# Patient Record
Sex: FEMALE | Race: WHITE | URBAN - METROPOLITAN AREA
[De-identification: names, ages, dates, MRNs, and addresses within clinical notes are randomized per-mention and may not be internally consistent; named-entity substitution may affect disease eponyms.]

---

## 2017-03-02 ENCOUNTER — IMPORTED ENCOUNTER (OUTPATIENT)
Dept: URBAN - METROPOLITAN AREA CLINIC 50 | Facility: CLINIC | Age: 82
End: 2017-03-02

## 2017-04-04 ENCOUNTER — IMPORTED ENCOUNTER (OUTPATIENT)
Dept: URBAN - METROPOLITAN AREA CLINIC 50 | Facility: CLINIC | Age: 82
End: 2017-04-04

## 2019-04-26 ENCOUNTER — RECORD ABSTRACTING (OUTPATIENT)
Age: 84
End: 2019-04-26

## 2019-04-26 DIAGNOSIS — M06.9 RHEUMATOID ARTHRITIS, UNSPECIFIED: ICD-10-CM

## 2019-04-26 DIAGNOSIS — I10 ESSENTIAL (PRIMARY) HYPERTENSION: ICD-10-CM

## 2019-04-26 DIAGNOSIS — Z83.3 FAMILY HISTORY OF DIABETES MELLITUS: ICD-10-CM

## 2019-04-26 DIAGNOSIS — M54.9 DORSALGIA, UNSPECIFIED: ICD-10-CM

## 2019-04-26 DIAGNOSIS — Z78.9 OTHER SPECIFIED HEALTH STATUS: ICD-10-CM

## 2019-04-26 DIAGNOSIS — C44.90 UNSPECIFIED MALIGNANT NEOPLASM OF SKIN, UNSPECIFIED: ICD-10-CM

## 2019-04-26 DIAGNOSIS — Z86.73 PERSONAL HISTORY OF TRANSIENT ISCHEMIC ATTACK (TIA), AND CEREBRAL INFARCTION W/OUT RESIDUAL DEFICITS: ICD-10-CM

## 2019-04-26 DIAGNOSIS — R10.9 UNSPECIFIED ABDOMINAL PAIN: ICD-10-CM

## 2019-04-26 DIAGNOSIS — M19.90 UNSPECIFIED OSTEOARTHRITIS, UNSPECIFIED SITE: ICD-10-CM

## 2019-04-26 DIAGNOSIS — Z80.3 FAMILY HISTORY OF MALIGNANT NEOPLASM OF BREAST: ICD-10-CM

## 2019-04-26 DIAGNOSIS — H40.9 UNSPECIFIED GLAUCOMA: ICD-10-CM

## 2019-04-26 DIAGNOSIS — F17.200 NICOTINE DEPENDENCE, UNSPECIFIED, UNCOMPLICATED: ICD-10-CM

## 2019-04-26 DIAGNOSIS — Z86.010 PERSONAL HISTORY OF COLONIC POLYPS: ICD-10-CM

## 2019-04-26 DIAGNOSIS — K59.00 CONSTIPATION, UNSPECIFIED: ICD-10-CM

## 2019-04-26 DIAGNOSIS — K80.20 CALCULUS OF GALLBLADDER W/OUT CHOLECYSTITIS W/OUT OBSTRUCTION: ICD-10-CM

## 2019-04-26 DIAGNOSIS — Z80.0 FAMILY HISTORY OF MALIGNANT NEOPLASM OF DIGESTIVE ORGANS: ICD-10-CM

## 2019-04-26 DIAGNOSIS — Z87.898 PERSONAL HISTORY OF OTHER SPECIFIED CONDITIONS: ICD-10-CM

## 2019-04-26 DIAGNOSIS — E78.00 PURE HYPERCHOLESTEROLEMIA, UNSPECIFIED: ICD-10-CM

## 2019-04-26 PROBLEM — Z00.00 ENCOUNTER FOR PREVENTIVE HEALTH EXAMINATION: Noted: 2019-04-26

## 2019-05-15 ENCOUNTER — APPOINTMENT (OUTPATIENT)
Dept: GASTROENTEROLOGY | Facility: CLINIC | Age: 84
End: 2019-05-15
Payer: MEDICARE

## 2019-05-15 VITALS
WEIGHT: 138 LBS | HEART RATE: 92 BPM | TEMPERATURE: 97.7 F | HEIGHT: 67 IN | BODY MASS INDEX: 21.66 KG/M2 | SYSTOLIC BLOOD PRESSURE: 127 MMHG | DIASTOLIC BLOOD PRESSURE: 71 MMHG

## 2019-05-15 PROCEDURE — 99213 OFFICE O/P EST LOW 20 MIN: CPT

## 2019-05-15 NOTE — HISTORY OF PRESENT ILLNESS
[de-identified] : 84 yo female with of left lower quadrant pain. CT scan did not show any pathology in that area other than diverticular disease. Patient was started on dicyclomine and has had resolution of her symptoms. She has been moving her bowels well. Issues about screening discussed with patient.

## 2019-09-13 ENCOUNTER — INPATIENT (INPATIENT)
Facility: HOSPITAL | Age: 84
LOS: 2 days | Discharge: HOME HEALTH | DRG: 804 | End: 2019-09-16
Attending: FAMILY MEDICINE | Admitting: FAMILY MEDICINE
Payer: MEDICARE

## 2019-09-13 VITALS
SYSTOLIC BLOOD PRESSURE: 153 MMHG | TEMPERATURE: 98 F | HEART RATE: 92 BPM | DIASTOLIC BLOOD PRESSURE: 86 MMHG | OXYGEN SATURATION: 88 % | RESPIRATION RATE: 18 BRPM

## 2019-09-13 DIAGNOSIS — T82.898A OTHER SPECIFIED COMPLICATION OF VASCULAR PROSTHETIC DEVICES, IMPLANTS AND GRAFTS, INITIAL ENCOUNTER: Chronic | ICD-10-CM

## 2019-09-13 DIAGNOSIS — R91.8 OTHER NONSPECIFIC ABNORMAL FINDING OF LUNG FIELD: ICD-10-CM

## 2019-09-13 DIAGNOSIS — Z98.890 OTHER SPECIFIED POSTPROCEDURAL STATES: Chronic | ICD-10-CM

## 2019-09-13 DIAGNOSIS — Q89.2 CONGENITAL MALFORMATIONS OF OTHER ENDOCRINE GLANDS: Chronic | ICD-10-CM

## 2019-09-13 LAB
ALBUMIN SERPL ELPH-MCNC: 3.3 G/DL — SIGNIFICANT CHANGE UP (ref 3.3–5)
ALP SERPL-CCNC: 79 U/L — SIGNIFICANT CHANGE UP (ref 40–120)
ALT FLD-CCNC: 22 U/L — SIGNIFICANT CHANGE UP (ref 12–78)
ANION GAP SERPL CALC-SCNC: 7 MMOL/L — SIGNIFICANT CHANGE UP (ref 5–17)
APTT BLD: 38.5 SEC — HIGH (ref 27.5–36.3)
AST SERPL-CCNC: 24 U/L — SIGNIFICANT CHANGE UP (ref 15–37)
BILIRUB SERPL-MCNC: 0.3 MG/DL — SIGNIFICANT CHANGE UP (ref 0.2–1.2)
BUN SERPL-MCNC: 15 MG/DL — SIGNIFICANT CHANGE UP (ref 7–23)
CALCIUM SERPL-MCNC: 9.2 MG/DL — SIGNIFICANT CHANGE UP (ref 8.5–10.1)
CHLORIDE SERPL-SCNC: 106 MMOL/L — SIGNIFICANT CHANGE UP (ref 96–108)
CO2 SERPL-SCNC: 27 MMOL/L — SIGNIFICANT CHANGE UP (ref 22–31)
CREAT SERPL-MCNC: 0.82 MG/DL — SIGNIFICANT CHANGE UP (ref 0.5–1.3)
GLUCOSE SERPL-MCNC: 106 MG/DL — HIGH (ref 70–99)
HCT VFR BLD CALC: 38.4 % — SIGNIFICANT CHANGE UP (ref 34.5–45)
HGB BLD-MCNC: 13.1 G/DL — SIGNIFICANT CHANGE UP (ref 11.5–15.5)
INR BLD: 1.39 RATIO — HIGH (ref 0.88–1.16)
MAGNESIUM SERPL-MCNC: 2.3 MG/DL — SIGNIFICANT CHANGE UP (ref 1.6–2.6)
MCHC RBC-ENTMCNC: 32.4 PG — SIGNIFICANT CHANGE UP (ref 27–34)
MCHC RBC-ENTMCNC: 34.1 GM/DL — SIGNIFICANT CHANGE UP (ref 32–36)
MCV RBC AUTO: 95 FL — SIGNIFICANT CHANGE UP (ref 80–100)
PLATELET # BLD AUTO: 230 K/UL — SIGNIFICANT CHANGE UP (ref 150–400)
POTASSIUM SERPL-MCNC: 4.3 MMOL/L — SIGNIFICANT CHANGE UP (ref 3.5–5.3)
POTASSIUM SERPL-SCNC: 4.3 MMOL/L — SIGNIFICANT CHANGE UP (ref 3.5–5.3)
PROT SERPL-MCNC: 7 GM/DL — SIGNIFICANT CHANGE UP (ref 6–8.3)
PROTHROM AB SERPL-ACNC: 15.6 SEC — HIGH (ref 10–12.9)
RBC # BLD: 4.04 M/UL — SIGNIFICANT CHANGE UP (ref 3.8–5.2)
RBC # FLD: 13 % — SIGNIFICANT CHANGE UP (ref 10.3–14.5)
SODIUM SERPL-SCNC: 140 MMOL/L — SIGNIFICANT CHANGE UP (ref 135–145)
TROPONIN I SERPL-MCNC: <0.015 NG/ML — SIGNIFICANT CHANGE UP (ref 0.01–0.04)
WBC # BLD: 9.77 K/UL — SIGNIFICANT CHANGE UP (ref 3.8–10.5)
WBC # FLD AUTO: 9.77 K/UL — SIGNIFICANT CHANGE UP (ref 3.8–10.5)

## 2019-09-13 PROCEDURE — 71250 CT THORAX DX C-: CPT

## 2019-09-13 PROCEDURE — 85027 COMPLETE CBC AUTOMATED: CPT

## 2019-09-13 PROCEDURE — 97116 GAIT TRAINING THERAPY: CPT | Mod: GP

## 2019-09-13 PROCEDURE — 76942 ECHO GUIDE FOR BIOPSY: CPT

## 2019-09-13 PROCEDURE — G0008: CPT

## 2019-09-13 PROCEDURE — 90686 IIV4 VACC NO PRSV 0.5 ML IM: CPT

## 2019-09-13 PROCEDURE — 38505 NEEDLE BIOPSY LYMPH NODES: CPT

## 2019-09-13 PROCEDURE — 94760 N-INVAS EAR/PLS OXIMETRY 1: CPT

## 2019-09-13 PROCEDURE — 80048 BASIC METABOLIC PNL TOTAL CA: CPT

## 2019-09-13 PROCEDURE — 93010 ELECTROCARDIOGRAM REPORT: CPT

## 2019-09-13 PROCEDURE — 88173 CYTOPATH EVAL FNA REPORT: CPT

## 2019-09-13 PROCEDURE — 85610 PROTHROMBIN TIME: CPT

## 2019-09-13 PROCEDURE — 88172 CYTP DX EVAL FNA 1ST EA SITE: CPT

## 2019-09-13 PROCEDURE — 81001 URINALYSIS AUTO W/SCOPE: CPT

## 2019-09-13 PROCEDURE — 94640 AIRWAY INHALATION TREATMENT: CPT

## 2019-09-13 PROCEDURE — 88305 TISSUE EXAM BY PATHOLOGIST: CPT

## 2019-09-13 PROCEDURE — 88342 IMHCHEM/IMCYTCHM 1ST ANTB: CPT

## 2019-09-13 PROCEDURE — 88341 IMHCHEM/IMCYTCHM EA ADD ANTB: CPT

## 2019-09-13 PROCEDURE — 85025 COMPLETE CBC W/AUTO DIFF WBC: CPT

## 2019-09-13 PROCEDURE — 84443 ASSAY THYROID STIM HORMONE: CPT

## 2019-09-13 PROCEDURE — 82607 VITAMIN B-12: CPT

## 2019-09-13 PROCEDURE — 36415 COLL VENOUS BLD VENIPUNCTURE: CPT

## 2019-09-13 PROCEDURE — 71250 CT THORAX DX C-: CPT | Mod: 26

## 2019-09-13 PROCEDURE — 97162 PT EVAL MOD COMPLEX 30 MIN: CPT | Mod: GP

## 2019-09-13 RX ORDER — ALPRAZOLAM 0.25 MG
0.25 TABLET ORAL
Refills: 0 | Status: DISCONTINUED | OUTPATIENT
Start: 2019-09-13 | End: 2019-09-16

## 2019-09-13 RX ORDER — ATORVASTATIN CALCIUM 80 MG/1
40 TABLET, FILM COATED ORAL AT BEDTIME
Refills: 0 | Status: DISCONTINUED | OUTPATIENT
Start: 2019-09-13 | End: 2019-09-16

## 2019-09-13 RX ORDER — DIPHENHYDRAMINE HCL 50 MG
50 CAPSULE ORAL AT BEDTIME
Refills: 0 | Status: DISCONTINUED | OUTPATIENT
Start: 2019-09-13 | End: 2019-09-16

## 2019-09-13 RX ORDER — ONDANSETRON 8 MG/1
4 TABLET, FILM COATED ORAL EVERY 6 HOURS
Refills: 0 | Status: DISCONTINUED | OUTPATIENT
Start: 2019-09-13 | End: 2019-09-16

## 2019-09-13 RX ORDER — BUDESONIDE AND FORMOTEROL FUMARATE DIHYDRATE 160; 4.5 UG/1; UG/1
2 AEROSOL RESPIRATORY (INHALATION)
Refills: 0 | Status: DISCONTINUED | OUTPATIENT
Start: 2019-09-13 | End: 2019-09-16

## 2019-09-13 RX ORDER — ACETAMINOPHEN 500 MG
650 TABLET ORAL EVERY 6 HOURS
Refills: 0 | Status: DISCONTINUED | OUTPATIENT
Start: 2019-09-13 | End: 2019-09-13

## 2019-09-13 RX ORDER — INFLUENZA VIRUS VACCINE 15; 15; 15; 15 UG/.5ML; UG/.5ML; UG/.5ML; UG/.5ML
0.5 SUSPENSION INTRAMUSCULAR ONCE
Refills: 0 | Status: COMPLETED | OUTPATIENT
Start: 2019-09-13 | End: 2019-09-16

## 2019-09-13 RX ORDER — GABAPENTIN 400 MG/1
300 CAPSULE ORAL
Refills: 0 | Status: DISCONTINUED | OUTPATIENT
Start: 2019-09-13 | End: 2019-09-16

## 2019-09-13 RX ORDER — DILTIAZEM HCL 120 MG
120 CAPSULE, EXT RELEASE 24 HR ORAL DAILY
Refills: 0 | Status: DISCONTINUED | OUTPATIENT
Start: 2019-09-13 | End: 2019-09-16

## 2019-09-13 RX ORDER — HEPARIN SODIUM 5000 [USP'U]/ML
5000 INJECTION INTRAVENOUS; SUBCUTANEOUS EVERY 12 HOURS
Refills: 0 | Status: DISCONTINUED | OUTPATIENT
Start: 2019-09-13 | End: 2019-09-15

## 2019-09-13 RX ORDER — OXYCODONE AND ACETAMINOPHEN 5; 325 MG/1; MG/1
1 TABLET ORAL EVERY 8 HOURS
Refills: 0 | Status: DISCONTINUED | OUTPATIENT
Start: 2019-09-13 | End: 2019-09-15

## 2019-09-13 RX ORDER — IPRATROPIUM/ALBUTEROL SULFATE 18-103MCG
3 AEROSOL WITH ADAPTER (GRAM) INHALATION EVERY 6 HOURS
Refills: 0 | Status: DISCONTINUED | OUTPATIENT
Start: 2019-09-13 | End: 2019-09-16

## 2019-09-13 RX ORDER — ASPIRIN/CALCIUM CARB/MAGNESIUM 324 MG
81 TABLET ORAL DAILY
Refills: 0 | Status: DISCONTINUED | OUTPATIENT
Start: 2019-09-13 | End: 2019-09-16

## 2019-09-13 RX ORDER — ACETAMINOPHEN 500 MG
650 TABLET ORAL ONCE
Refills: 0 | Status: COMPLETED | OUTPATIENT
Start: 2019-09-13 | End: 2019-09-13

## 2019-09-13 RX ORDER — OXYCODONE AND ACETAMINOPHEN 5; 325 MG/1; MG/1
1 TABLET ORAL ONCE
Refills: 0 | Status: DISCONTINUED | OUTPATIENT
Start: 2019-09-13 | End: 2019-09-13

## 2019-09-13 RX ORDER — LOSARTAN POTASSIUM 100 MG/1
50 TABLET, FILM COATED ORAL DAILY
Refills: 0 | Status: DISCONTINUED | OUTPATIENT
Start: 2019-09-13 | End: 2019-09-14

## 2019-09-13 RX ADMIN — OXYCODONE AND ACETAMINOPHEN 1 TABLET(S): 5; 325 TABLET ORAL at 23:00

## 2019-09-13 RX ADMIN — HEPARIN SODIUM 5000 UNIT(S): 5000 INJECTION INTRAVENOUS; SUBCUTANEOUS at 17:03

## 2019-09-13 RX ADMIN — Medication 10 MILLIGRAM(S): at 17:03

## 2019-09-13 RX ADMIN — GABAPENTIN 300 MILLIGRAM(S): 400 CAPSULE ORAL at 23:01

## 2019-09-13 RX ADMIN — Medication 3 MILLILITER(S): at 20:36

## 2019-09-13 RX ADMIN — Medication 650 MILLIGRAM(S): at 21:32

## 2019-09-13 RX ADMIN — GABAPENTIN 300 MILLIGRAM(S): 400 CAPSULE ORAL at 17:03

## 2019-09-13 RX ADMIN — OXYCODONE AND ACETAMINOPHEN 1 TABLET(S): 5; 325 TABLET ORAL at 23:30

## 2019-09-13 RX ADMIN — ATORVASTATIN CALCIUM 40 MILLIGRAM(S): 80 TABLET, FILM COATED ORAL at 21:18

## 2019-09-13 RX ADMIN — OXYCODONE AND ACETAMINOPHEN 1 TABLET(S): 5; 325 TABLET ORAL at 15:07

## 2019-09-13 NOTE — CONSULT NOTE ADULT - SUBJECTIVE AND OBJECTIVE BOX
INPATIENT CONSULTATION   REQUESTING PHYSICIAN: Dr. Lezama  REASON FOR CONSULT: Large left upper lobe mass  HISTORY OF PRESENT ILLNESS: Overall this is an 85-year-old female who initially presented several weeks ago after extensive history of chest discomfort and shortness of breath.  The patient had underwent CT of the chest which was suggested a large left upper lobe pulmonary lesion encasing the right pulmonary artery.  The patient was seen approximately 10 days ago subsequently underwent an MRI of the brain as well as a PET/CT which was notable for the presence of supraclavicular nodes being FDG avid.  Of note the patient had contacted our office after hours with concerns regarding weakness and inability to grab objects at home.  The patient was advised to come to the emergency room due to concerns regarding safety at home.  As the patient had significant neurological deficits and were unable to be assessed otherwise.  The patient upon arrival to the emergency room underwent a CT of the chest which we emphasized the presence of the left upper lobe lung mass as well as the supraclavicular nodes.  The patient had been already scheduled in who was seen by radiation oncology due to the concern regarding this left upper lobe lesion.  Due to the patient's progressive symptoms as well as the abruptness of the symptoms and her independent lifestyle whereby she lives alone is on full dose anticoagulation and noted that she was quite unsteady .  She also notes that over the last day or so she has been unable to firmly grabbed any objects as well as has significant difficulty with writing with her left hand that has been progressive over the last 1-2 days.    PAST MEDICAL HISTORY:    1.  Severe COPD  2.  Left upper lobe lung mass  3.  Atrial fibrillation on full dose anticoagulation with Eliquis  MEDICATIONS:  MEDICATIONS  (STANDING):  ALBUTerol/ipratropium for Nebulization 3 milliLiter(s) Nebulizer every 6 hours  aspirin enteric coated 81 milliGRAM(s) Oral daily  atorvastatin 40 milliGRAM(s) Oral at bedtime  buDESOnide 160 MICROgram(s)/formoterol 4.5 MICROgram(s) Inhaler 2 Puff(s) Inhalation two times a day  dicyclomine 10 milliGRAM(s) Oral three times a day before meals  diltiazem    milliGRAM(s) Oral daily  gabapentin 300 milliGRAM(s) Oral four times a day  heparin  Injectable 5000 Unit(s) SubCutaneous every 12 hours  influenza   Vaccine 0.5 milliLiter(s) IntraMuscular once  losartan 50 milliGRAM(s) Oral daily    ALLERGIES:     Allergies    No Known Allergies    Intolerances      SOCIAL HISTORY: Active smoker smoked greater than 40-pack-year smoking history  FAMILY HISTORY: Sibling with lung cancer  REVIEW OF SYSTEMS: Patient notes a significant weakness in the left upper extremity no facial redness fullness or pain associated with the extremity does note some paresthesias in the left upper extremity.  Patient denies any chest pain any additional lightheadedness or dizziness.  PHYSICAL EXAMINATION:   The patient appeared well nourished and normally developed. Vital signs as documented. Head exam is unremarkable. No scleral icterus or corneal arcus noted. Neck is without jugular venous distension, thyromegaly, or carotid bruits. Carotid upstrokes are brisk bilaterally. Lungs are noted to have diminished breath sounds in the left upper lung field. Cardiac exam reveals . Rhythm is regular. First and second heart sounds normal. No murmurs, rubs or gallops. Abdominal exam reveals normal bowel sounds, no masses, no organomegaly and no aortic enlargement. Extremities are nonedematous and both femoral and pedal pulses are normal.  Neurologically the patient has significantly impaired  and hand strength in the left upper extremity sensation is diminished in the median nerve distribution and also on exam the patient had significant difficulty with writing her name.    ADDITIONAL DATA:    .  LABS:                         13.1   9.77  )-----------( 230      ( 13 Sep 2019 12:54 )             38.4     09-13    140  |  106  |  15  ----------------------------<  106<H>  4.3   |  27  |  0.82    Ca    9.2      13 Sep 2019 12:54  Mg     2.3     09-13    TPro  7.0  /  Alb  3.3  /  TBili  0.3  /  DBili  x   /  AST  24  /  ALT  22  /  AlkPhos  79  09-13    PT/INR - ( 13 Sep 2019 12:54 )   PT: 15.6 sec;   INR: 1.39 ratio         PTT - ( 13 Sep 2019 12:54 )  PTT:38.5 sec          RADIOLOGY, EKG & ADDITIONAL TESTS: Reviewed.     ASSESSMENT:    85-year-old female with extensive tobacco use history with severe emphysematous disease recently found to have a large extensive left upper lobe lung mass with supraclavicular lymphadenopathy and hence if deemed to be positive as suspected with malignancy stage IV lung cancer.    At this time it is unclear as far as the patient's pathology.  The patient has been evaluated by both radiation oncology in outpatient setting and was pending evaluation by thoracic surgery for biopsy of the lesion.  I explained to the patient my concern regarding her progressive neurologic symptoms over the last 1-2 days and as such as the patient lives independently I am concerned regarding her overall safety and her increased risk of possibly undergoing a traumatic fall.    The patient is presently on full dose anticoagulation and would hold off on Eliquis over the next 1-2 days and plan for a supraclavicular biopsy.    Also of note during today's visit had an extensive discussion with the patient whereby we discussed overall prognosis.  She was quite interested if she were to forego any systemic treatment due to the overall prognosis of her disease.  I explained to her at this time it would be imperative for us to establish a diagnosis prior to proceeding with any planned options regarding discontinuation ablation or no treatment.    I explained to her I am increasingly concerned about the progression of the left upper lobe lesion as it may cause significant morbidity associated with progression as well as neurological compromise. Mri was recenltly performed and was negative.     I did explain to the patient that if we were not to pursue any systemic chemotherapy that her prognosis would indeed be hospice eligible.  Next the line at this time the patient would not like to pursue with a workup as well as be mindful of any particular treatment strategies which may be employed in her care.    Recommendations:     Proceed with biopsy of supraclavicular lymph node.  Thoracic Surgery    Would recommend a physical therapy and Occupational Therapy consult.  Also would recommend continue ongoing neurological checks on the patient to ensure no significant progression.  If the patient's symptoms should significantly change over the weekend would recommend radiation oncology evaluation.  The patient has already been seen by Dr. Luc Bunn on Thursday.

## 2019-09-13 NOTE — ED ADULT TRIAGE NOTE - DIRECT TO ROOM CARE INITIATED:
13-Sep-2019 12:13 Cartilage Graft Text: The defect edges were debeveled with a #15 scalpel blade.  Given the location of the defect, shape of the defect, the fact the defect involved a full thickness cartilage defect a cartilage graft was deemed most appropriate.  An appropriate donor site was identified, cleansed, and anesthetized. The cartilage graft was then harvested and transferred to the recipient site, oriented appropriately and then sutured into place.  The secondary defect was then repaired using a primary closure.

## 2019-09-13 NOTE — ED PROVIDER NOTE - PMH
COPD (chronic obstructive pulmonary disease)    HLD (hyperlipidemia)    HTN (hypertension)    Lung mass    Rheumatoid arthritis

## 2019-09-13 NOTE — H&P ADULT - HISTORY OF PRESENT ILLNESS
84 y/o WF with a PMHx of COPD, Left Lung mass, PAfib, HLD, HTN, RA presents to the ED c/o left arm pain for the past few months associated with some weakness and tingling of L hand. She called her PMD who directed her to the emergency room.      Past Medical History:  COPD (chronic obstructive pulmonary disease)    HLD (hyperlipidemia)    HTN (hypertension)    Lung mass    Rheumatoid arthritis.    Past Surgical History:  abdominal herniorrhaphy with mesh  Rt hand surgery     Social History:  active smoker, no Etoh, no drugs    Family History:  both parents CVA      REVIEW OF SYSTEMS:    CONSTITUTIONAL: No weakness, No fevers or chills  ENT: No ear ache, No sorethroat  NECK: No pain, No stiffness  RESPIRATORY: No cough, No wheezing, No hemoptysis; No dyspnea  CARDIOVASCULAR: No chest pain, No palpitations  GASTROINTESTINAL: No abd pain, No nausea, No vomiting, No hematemesis, No diarrhea or constipation. No melena, No hematochezia.  GENITOURINARY: No dysuria, No  hematuria  NEUROLOGICAL: No diplopia, No paresthesia, No motor dysfunction  MUSCULOSKELETAL: No arthralgia, No myalgia  SKIN: No rashes, or lesions   PSYCH: no anxiety, no suicidal ideation    All other review of systems is negative unless indicated above    Vital Signs Last 24 Hrs  T(C): 36.6 (13 Sep 2019 12:24), Max: 36.6 (13 Sep 2019 12:24)  T(F): 97.8 (13 Sep 2019 12:24), Max: 97.8 (13 Sep 2019 12:24)  HR: 99 (13 Sep 2019 14:14) (92 - 99)  BP: 128/94 (13 Sep 2019 14:14) (128/94 - 153/86)  RR: 22 (13 Sep 2019 14:14) (18 - 22)  SpO2: 97% (13 Sep 2019 14:14) (88% - 97%)    PHYSICAL EXAM:    GENERAL: NAD, Well nourished  HEENT:  NC/AT, EOMI, PERRLA, No scleral icterus, Moist mucous membranes  NECK: Supple, No JVD  CNS:  Alert & Oriented X3, Motor Strength 5/5 B/L upper and lower extremities; DTRs 2+ intact   LUNG: decreased BS bilaterally  HEART: RRR; No murmurs, No rubs  ABDOMEN: +BS, ST/ND/NT  GENITOURINARY: Voiding, Bladder not distended  EXTREMITIES:  2+ Peripheral Pulses, No clubbing, No cyanosis, No tibial edema  MUSCULOSKELTAL: Joints normal ROM, No TTP, No effusion  SKIN: no rashes  RECTAL: deferred, not indicated  BREAST: deferred                          13.1   9.77  )-----------( 230      ( 13 Sep 2019 12:54 )             38.4     09-13    140  |  106  |  15  ----------------------------<  106<H>  4.3   |  27  |  0.82    Ca    9.2      13 Sep 2019 12:54  Mg     2.3     09-13    TPro  7.0  /  Alb  3.3  /  TBili  0.3  /  DBili  x   /  AST  24  /  ALT  22  /  AlkPhos  79  09-13      MEDICATIONS  (STANDING):  ALBUTerol/ipratropium for Nebulization 3 milliLiter(s) Nebulizer every 6 hours  aspirin enteric coated 81 milliGRAM(s) Oral daily  atorvastatin 40 milliGRAM(s) Oral at bedtime  buDESOnide 160 MICROgram(s)/formoterol 4.5 MICROgram(s) Inhaler 2 Puff(s) Inhalation two times a day  dicyclomine 10 milliGRAM(s) Oral three times a day before meals  diltiazem    milliGRAM(s) Oral daily  gabapentin 300 milliGRAM(s) Oral four times a day  losartan 50 milliGRAM(s) Oral daily    MEDICATIONS  (PRN):  ALPRAZolam 0.25 milliGRAM(s) Oral two times a day PRN for anxiety, for insomnia  aluminum hydroxide/magnesium hydroxide/simethicone Suspension 30 milliLiter(s) Oral every 4 hours PRN Dyspepsia  diphenhydrAMINE 50 milliGRAM(s) Oral at bedtime PRN Insomnia  ondansetron Injectable 4 milliGRAM(s) IV Push every 6 hours PRN Nausea  oxyCODONE    5 mG/acetaminophen 325 mG 1 Tablet(s) Oral every 8 hours PRN for moderate pain      all labs reviewed  all imaging reviewed      a/p:    1. Left lung mass:  Pulmonary evaluation  IR evaluation for biopsy    2. LUE paresthesia likely due brachial plexus compression    3. Afib:   Ekg: NSR  c/w Cardizem  hold Apixaban prior to biopsy    4. Htn:   cw Losartan    5. Copd:  stable  cw LABA 86 y/o WF with a PMHx of COPD, Left Lung mass, PAfib, HLD, HTN, RA presents to the ED c/o left arm pain for the past few months associated with some weakness and tingling of L hand. She called her PMD who directed her to the emergency room.      Past Medical History:  COPD (chronic obstructive pulmonary disease)    HLD (hyperlipidemia)    HTN (hypertension)    Lung mass    Rheumatoid arthritis.    Past Surgical History:  abdominal herniorrhaphy with mesh  Rt hand surgery     Social History:  active smoker, no Etoh, no drugs    Family History:  both parents CVA      REVIEW OF SYSTEMS:    CONSTITUTIONAL: No weakness, No fevers or chills  ENT: No ear ache, No sorethroat  NECK: No pain, No stiffness  RESPIRATORY: No cough, No wheezing, No hemoptysis; No dyspnea  CARDIOVASCULAR: No chest pain, No palpitations  GASTROINTESTINAL: No abd pain, No nausea, No vomiting, No hematemesis, No diarrhea or constipation. No melena, No hematochezia.  GENITOURINARY: No dysuria, No  hematuria  NEUROLOGICAL: No diplopia, No paresthesia, No motor dysfunction  MUSCULOSKELETAL: No arthralgia, No myalgia  SKIN: No rashes, or lesions   PSYCH: no anxiety, no suicidal ideation    All other review of systems is negative unless indicated above    Vital Signs Last 24 Hrs  T(C): 36.6 (13 Sep 2019 12:24), Max: 36.6 (13 Sep 2019 12:24)  T(F): 97.8 (13 Sep 2019 12:24), Max: 97.8 (13 Sep 2019 12:24)  HR: 99 (13 Sep 2019 14:14) (92 - 99)  BP: 128/94 (13 Sep 2019 14:14) (128/94 - 153/86)  RR: 22 (13 Sep 2019 14:14) (18 - 22)  SpO2: 97% (13 Sep 2019 14:14) (88% - 97%)    PHYSICAL EXAM:    GENERAL: NAD, Well nourished  HEENT:  NC/AT, EOMI, PERRLA, No scleral icterus, Moist mucous membranes  NECK: Supple, No JVD  CNS:  Alert & Oriented X3, Motor Strength 5/5 B/L upper and lower extremities; DTRs 2+ intact   LUNG: decreased BS bilaterally  HEART: RRR; No murmurs, No rubs  ABDOMEN: +BS, ST/ND/NT  GENITOURINARY: Voiding, Bladder not distended  EXTREMITIES:  2+ Peripheral Pulses, No clubbing, No cyanosis, No tibial edema  MUSCULOSKELTAL: Joints normal ROM, No TTP, No effusion  SKIN: no rashes  RECTAL: deferred, not indicated  BREAST: deferred                          13.1   9.77  )-----------( 230      ( 13 Sep 2019 12:54 )             38.4     09-13    140  |  106  |  15  ----------------------------<  106<H>  4.3   |  27  |  0.82    Ca    9.2      13 Sep 2019 12:54  Mg     2.3     09-13    TPro  7.0  /  Alb  3.3  /  TBili  0.3  /  DBili  x   /  AST  24  /  ALT  22  /  AlkPhos  79  09-13      MEDICATIONS  (STANDING):  ALBUTerol/ipratropium for Nebulization 3 milliLiter(s) Nebulizer every 6 hours  aspirin enteric coated 81 milliGRAM(s) Oral daily  atorvastatin 40 milliGRAM(s) Oral at bedtime  buDESOnide 160 MICROgram(s)/formoterol 4.5 MICROgram(s) Inhaler 2 Puff(s) Inhalation two times a day  dicyclomine 10 milliGRAM(s) Oral three times a day before meals  diltiazem    milliGRAM(s) Oral daily  gabapentin 300 milliGRAM(s) Oral four times a day  losartan 50 milliGRAM(s) Oral daily    MEDICATIONS  (PRN):  ALPRAZolam 0.25 milliGRAM(s) Oral two times a day PRN for anxiety, for insomnia  aluminum hydroxide/magnesium hydroxide/simethicone Suspension 30 milliLiter(s) Oral every 4 hours PRN Dyspepsia  diphenhydrAMINE 50 milliGRAM(s) Oral at bedtime PRN Insomnia  ondansetron Injectable 4 milliGRAM(s) IV Push every 6 hours PRN Nausea  oxyCODONE    5 mG/acetaminophen 325 mG 1 Tablet(s) Oral every 8 hours PRN for moderate pain      all labs reviewed  all imaging reviewed      a/p:    1. Left lung mass:  CT Chest   Pulmonary/Chest surgery evaluation    2. LUE paresthesia likely due L brachial plexus compression    3. Afib:   Ekg: NSR  c/w Cardizem  hold Apixaban prior to biopsy    4. Htn:   cw Losartan    5. Copd:  stable  cw LABA    6. DVT prophy:   UFH Bid 86 y/o WF with a PMHx of COPD, Left Lung mass, PAfib, RA previously on immunosuppressant tx, HLD, HTN, RA presents to the ED c/o left arm pain for the past few months associated with some weakness and tingling of L hand. She called her PMD who directed her to the emergency room.      Past Medical History:  COPD (chronic obstructive pulmonary disease)    HLD (hyperlipidemia)    HTN (hypertension)    Lung mass    Rheumatoid arthritis.    Past Surgical History:  abdominal herniorrhaphy with mesh  Rt hand surgery     Social History:  active smoker, no Etoh, no drugs    Family History:  both parents CVA      REVIEW OF SYSTEMS:    CONSTITUTIONAL: No weakness, No fevers or chills  ENT: No ear ache, No sorethroat  NECK: No pain, No stiffness  RESPIRATORY: No cough, No wheezing, No hemoptysis; No dyspnea  CARDIOVASCULAR: No chest pain, No palpitations  GASTROINTESTINAL: No abd pain, No nausea, No vomiting, No hematemesis, No diarrhea or constipation. No melena, No hematochezia.  GENITOURINARY: No dysuria, No  hematuria  NEUROLOGICAL: No diplopia, No paresthesia, No motor dysfunction  MUSCULOSKELETAL: No arthralgia, No myalgia  SKIN: No rashes, or lesions   PSYCH: no anxiety, no suicidal ideation    All other review of systems is negative unless indicated above    Vital Signs Last 24 Hrs  T(C): 36.6 (13 Sep 2019 12:24), Max: 36.6 (13 Sep 2019 12:24)  T(F): 97.8 (13 Sep 2019 12:24), Max: 97.8 (13 Sep 2019 12:24)  HR: 99 (13 Sep 2019 14:14) (92 - 99)  BP: 128/94 (13 Sep 2019 14:14) (128/94 - 153/86)  RR: 22 (13 Sep 2019 14:14) (18 - 22)  SpO2: 97% (13 Sep 2019 14:14) (88% - 97%)    PHYSICAL EXAM:    GENERAL: NAD, Well nourished  HEENT:  NC/AT, EOMI, PERRLA, No scleral icterus, Moist mucous membranes  NECK: Supple, No JVD  CNS:  Alert & Oriented X3, Motor Strength 5/5 B/L upper and lower extremities; DTRs 2+ intact   LUNG: decreased BS bilaterally  HEART: RRR; No murmurs, No rubs  ABDOMEN: +BS, ST/ND/NT  GENITOURINARY: Voiding, Bladder not distended  EXTREMITIES:  2+ Peripheral Pulses, No clubbing, No cyanosis, No tibial edema  MUSCULOSKELTAL: Joints normal ROM, No TTP, No effusion  SKIN: no rashes  RECTAL: deferred, not indicated  BREAST: deferred                          13.1   9.77  )-----------( 230      ( 13 Sep 2019 12:54 )             38.4     09-13    140  |  106  |  15  ----------------------------<  106<H>  4.3   |  27  |  0.82    Ca    9.2      13 Sep 2019 12:54  Mg     2.3     09-13    TPro  7.0  /  Alb  3.3  /  TBili  0.3  /  DBili  x   /  AST  24  /  ALT  22  /  AlkPhos  79  09-13      MEDICATIONS  (STANDING):  ALBUTerol/ipratropium for Nebulization 3 milliLiter(s) Nebulizer every 6 hours  aspirin enteric coated 81 milliGRAM(s) Oral daily  atorvastatin 40 milliGRAM(s) Oral at bedtime  buDESOnide 160 MICROgram(s)/formoterol 4.5 MICROgram(s) Inhaler 2 Puff(s) Inhalation two times a day  dicyclomine 10 milliGRAM(s) Oral three times a day before meals  diltiazem    milliGRAM(s) Oral daily  gabapentin 300 milliGRAM(s) Oral four times a day  losartan 50 milliGRAM(s) Oral daily    MEDICATIONS  (PRN):  ALPRAZolam 0.25 milliGRAM(s) Oral two times a day PRN for anxiety, for insomnia  aluminum hydroxide/magnesium hydroxide/simethicone Suspension 30 milliLiter(s) Oral every 4 hours PRN Dyspepsia  diphenhydrAMINE 50 milliGRAM(s) Oral at bedtime PRN Insomnia  ondansetron Injectable 4 milliGRAM(s) IV Push every 6 hours PRN Nausea  oxyCODONE    5 mG/acetaminophen 325 mG 1 Tablet(s) Oral every 8 hours PRN for moderate pain      all labs reviewed  all imaging reviewed      a/p:    1. Left lung mass:  CT Chest   Pulmonary/Chest surgery evaluation    2. LUE paresthesia likely due L brachial plexus compression    3. Afib:   Ekg: NSR  c/w Cardizem  hold Apixaban prior to biopsy    4. Htn:   cw Losartan    5. Copd:  stable  cw LABA    6. DVT prophy:   UFH Bid

## 2019-09-13 NOTE — ED PROVIDER NOTE - MUSCULOSKELETAL, MLM
Spine appears normal, range of motion is not limited. Left arm with pain, full ROM, 2+radial pulse. No swelling. Cap refill less than 2 seconds.

## 2019-09-13 NOTE — PATIENT PROFILE ADULT - BRADEN MOISTURE
Reason for Call:  Other     Detailed comments: Pt is requesting appt for IUD removal ASAP. States it is painful and feels like it is stabbing her (pt has had IUD for approximately 2 years). - Please advise    Phone Number Patient can be reached at: Home number on file [phone] (home)    Best Time: Any    Can we leave a detailed message on this number? YES    Call taken on 8/20/2018 at 9:10 AM by Denise Behrendt       (4) rarely moist

## 2019-09-13 NOTE — ED ADULT NURSE NOTE - NSIMPLEMENTINTERV_GEN_ALL_ED
Implemented All Universal Safety Interventions:  [place] to call system. Call bell, personal items and telephone within reach. Instruct patient to call for assistance. Room bathroom lighting operational. Non-slip footwear when patient is off stretcher. Physically safe environment: no spills, clutter or unnecessary equipment. Stretcher in lowest position, wheels locked, appropriate side rails in place.

## 2019-09-13 NOTE — ED PROVIDER NOTE - OBJECTIVE STATEMENT
84 y/o female with a PMHx of COPD, lung mass, cardiac arrhyhtmia, HLD, HTN, RA presents to the ED c/o left arm pain. Pt was recently told she has a lung mass. Pt states she received a call from Dr. Campbell and was told to come to ED for a biopsy. +left arm pain, +CP. Smoker (5 cigarettes a day). No other complaints at this time.

## 2019-09-13 NOTE — ED ADULT TRIAGE NOTE - CHIEF COMPLAINT QUOTE
patient ambulated in with a steady gait. complains of left arm pain. unable to obtain much information from patient in triage as she states "Dr. Campbell told me to get right over here and he should have called ahead". no acute distress noted.

## 2019-09-13 NOTE — ED ADULT NURSE NOTE - OBJECTIVE STATEMENT
sent by dr. herrera to be evaluated for L arm pain. pt recently dx with lung Ca 1 weeks ago. pt smoking since age of 16 currently smokes 5 cigs a day. no c/o SOB, CP, dizziness, palpitations, or DUBOIS. hx HTN, AFIB, HLD, COPD no home O2

## 2019-09-14 LAB
APPEARANCE UR: CLEAR — SIGNIFICANT CHANGE UP
BILIRUB UR-MCNC: NEGATIVE — SIGNIFICANT CHANGE UP
COLOR SPEC: YELLOW — SIGNIFICANT CHANGE UP
DIFF PNL FLD: ABNORMAL
GLUCOSE UR QL: NEGATIVE MG/DL — SIGNIFICANT CHANGE UP
KETONES UR-MCNC: NEGATIVE — SIGNIFICANT CHANGE UP
LEUKOCYTE ESTERASE UR-ACNC: NEGATIVE — SIGNIFICANT CHANGE UP
NITRITE UR-MCNC: NEGATIVE — SIGNIFICANT CHANGE UP
PH UR: 6 — SIGNIFICANT CHANGE UP (ref 5–8)
PROT UR-MCNC: 15 MG/DL
SP GR SPEC: 1.01 — SIGNIFICANT CHANGE UP (ref 1.01–1.02)
UROBILINOGEN FLD QL: NEGATIVE MG/DL — SIGNIFICANT CHANGE UP

## 2019-09-14 PROCEDURE — ZZZZZ: CPT

## 2019-09-14 RX ORDER — SENNA PLUS 8.6 MG/1
2 TABLET ORAL AT BEDTIME
Refills: 0 | Status: DISCONTINUED | OUTPATIENT
Start: 2019-09-14 | End: 2019-09-16

## 2019-09-14 RX ORDER — POLYETHYLENE GLYCOL 3350 17 G/17G
17 POWDER, FOR SOLUTION ORAL DAILY
Refills: 0 | Status: DISCONTINUED | OUTPATIENT
Start: 2019-09-14 | End: 2019-09-16

## 2019-09-14 RX ORDER — LIDOCAINE 4 G/100G
2 CREAM TOPICAL DAILY
Refills: 0 | Status: DISCONTINUED | OUTPATIENT
Start: 2019-09-14 | End: 2019-09-16

## 2019-09-14 RX ORDER — CYCLOBENZAPRINE HYDROCHLORIDE 10 MG/1
5 TABLET, FILM COATED ORAL THREE TIMES A DAY
Refills: 0 | Status: DISCONTINUED | OUTPATIENT
Start: 2019-09-14 | End: 2019-09-16

## 2019-09-14 RX ORDER — ACETAMINOPHEN 500 MG
650 TABLET ORAL ONCE
Refills: 0 | Status: COMPLETED | OUTPATIENT
Start: 2019-09-14 | End: 2019-09-14

## 2019-09-14 RX ORDER — OXYCODONE HYDROCHLORIDE 5 MG/1
10 TABLET ORAL EVERY 4 HOURS
Refills: 0 | Status: DISCONTINUED | OUTPATIENT
Start: 2019-09-14 | End: 2019-09-15

## 2019-09-14 RX ORDER — LIDOCAINE 4 G/100G
1 CREAM TOPICAL DAILY
Refills: 0 | Status: DISCONTINUED | OUTPATIENT
Start: 2019-09-14 | End: 2019-09-16

## 2019-09-14 RX ADMIN — Medication 10 MILLIGRAM(S): at 05:25

## 2019-09-14 RX ADMIN — BUDESONIDE AND FORMOTEROL FUMARATE DIHYDRATE 2 PUFF(S): 160; 4.5 AEROSOL RESPIRATORY (INHALATION) at 08:59

## 2019-09-14 RX ADMIN — Medication 3 MILLILITER(S): at 14:08

## 2019-09-14 RX ADMIN — Medication 3 MILLILITER(S): at 08:48

## 2019-09-14 RX ADMIN — GABAPENTIN 300 MILLIGRAM(S): 400 CAPSULE ORAL at 18:37

## 2019-09-14 RX ADMIN — Medication 3 MILLILITER(S): at 00:45

## 2019-09-14 RX ADMIN — LIDOCAINE 2 PATCH: 4 CREAM TOPICAL at 19:24

## 2019-09-14 RX ADMIN — LOSARTAN POTASSIUM 50 MILLIGRAM(S): 100 TABLET, FILM COATED ORAL at 05:25

## 2019-09-14 RX ADMIN — SENNA PLUS 2 TABLET(S): 8.6 TABLET ORAL at 21:42

## 2019-09-14 RX ADMIN — OXYCODONE HYDROCHLORIDE 10 MILLIGRAM(S): 5 TABLET ORAL at 16:18

## 2019-09-14 RX ADMIN — Medication 10 MILLIGRAM(S): at 16:18

## 2019-09-14 RX ADMIN — LIDOCAINE 1 PATCH: 4 CREAM TOPICAL at 12:13

## 2019-09-14 RX ADMIN — Medication 3 MILLILITER(S): at 20:37

## 2019-09-14 RX ADMIN — Medication 0.25 MILLIGRAM(S): at 16:32

## 2019-09-14 RX ADMIN — Medication 81 MILLIGRAM(S): at 12:15

## 2019-09-14 RX ADMIN — Medication 0.25 MILLIGRAM(S): at 21:42

## 2019-09-14 RX ADMIN — GABAPENTIN 300 MILLIGRAM(S): 400 CAPSULE ORAL at 05:25

## 2019-09-14 RX ADMIN — Medication 120 MILLIGRAM(S): at 05:25

## 2019-09-14 RX ADMIN — OXYCODONE AND ACETAMINOPHEN 1 TABLET(S): 5; 325 TABLET ORAL at 09:37

## 2019-09-14 RX ADMIN — CYCLOBENZAPRINE HYDROCHLORIDE 5 MILLIGRAM(S): 10 TABLET, FILM COATED ORAL at 22:53

## 2019-09-14 RX ADMIN — Medication 10 MILLIGRAM(S): at 12:15

## 2019-09-14 RX ADMIN — BUDESONIDE AND FORMOTEROL FUMARATE DIHYDRATE 2 PUFF(S): 160; 4.5 AEROSOL RESPIRATORY (INHALATION) at 20:38

## 2019-09-14 RX ADMIN — GABAPENTIN 300 MILLIGRAM(S): 400 CAPSULE ORAL at 12:15

## 2019-09-14 RX ADMIN — HEPARIN SODIUM 5000 UNIT(S): 5000 INJECTION INTRAVENOUS; SUBCUTANEOUS at 05:25

## 2019-09-14 RX ADMIN — Medication 650 MILLIGRAM(S): at 05:43

## 2019-09-14 RX ADMIN — GABAPENTIN 300 MILLIGRAM(S): 400 CAPSULE ORAL at 23:01

## 2019-09-14 RX ADMIN — HEPARIN SODIUM 5000 UNIT(S): 5000 INJECTION INTRAVENOUS; SUBCUTANEOUS at 18:37

## 2019-09-14 RX ADMIN — LIDOCAINE 2 PATCH: 4 CREAM TOPICAL at 10:28

## 2019-09-14 RX ADMIN — LIDOCAINE 1 PATCH: 4 CREAM TOPICAL at 20:24

## 2019-09-14 RX ADMIN — ATORVASTATIN CALCIUM 40 MILLIGRAM(S): 80 TABLET, FILM COATED ORAL at 21:42

## 2019-09-14 RX ADMIN — OXYCODONE AND ACETAMINOPHEN 1 TABLET(S): 5; 325 TABLET ORAL at 08:33

## 2019-09-14 NOTE — PROGRESS NOTE ADULT - SUBJECTIVE AND OBJECTIVE BOX
Subjective:  Patient is a 85y old  Female who presents with a chief complaint of  left arm pain   HPI:      84 y/o WF with a PMHx of COPD, Left Lung mass, PAfib, RA previously on immunosuppressant tx, HLD, HTN, RA presents to the   on   c/o left arm pain for the past few months associated with some weakness and tingling of L hand. She called her PMD who directed her to the emergency room.     - Patient seen and examined at bedside earlier today,  reports upper left back and left arm pain , denies cp, + dyspnea at baseline due to COPD , afebrile, + BM     Review of system- Rest of the review of system are negative except mentioned in HPI    OBJECTIVE:   T(C): 36.8 (19 @ 12:01), Max: 36.9 (19 @ 20:30)  HR: 84 (19 @ 12:01) (78 - 99)  BP: 91/65 (19 @ 12:01) (91/65 - 132/75)  RR: 19 (19 @ 12:01) (18 - 22)  SpO2: 91% (19 @ 12:01) (91% - 97%)  Wt(kg): --  Daily     Daily     PHYSICAL EXAM:  GENERAL: NAD  NERVOUS SYSTEM:  Alert & Oriented X3, non- focal exam, Motor Strength 5/5 B/L upper and lower extremities; DTRs 2+ intact and symmetric  HEAD:  Atraumatic, Normocephalic  EYES: EOMI, PERRLA, conjunctiva and sclera clear  HEENT: Moist mucous membranes  NECK: Supple, No JVD  CHEST/LUNG: BS decreased bilaterally; No rales, no rhonchi, +  wheezing, no  rubs  HEART: Regular rate and rhythm; No murmurs, rubs, or gallops  ABDOMEN: Soft, Nontender, Nondistended; Bowel sounds present  GENITOURINARY- Voiding, no suprapubic tenderness  EXTREMITIES:  2+ Peripheral Pulses, No clubbing, cyanosis, or edema  MUSCULOSKELETAL:- No muscle tenderness, Muscle tone normal, LUE pain on movements   SKIN-no rash, no lesion    LABS:                        13.1   9.77  )-----------( 230      ( 13 Sep 2019 12:54 )             38.4         140  |  106  |  15  ----------------------------<  106<H>  4.3   |  27  |  0.82    Ca    9.2      13 Sep 2019 12:54  Mg     2.3         TPro  7.0  /  Alb  3.3  /  TBili  0.3  /  DBili  x   /  AST  24  /  ALT  22  /  AlkPhos  79      PT/INR - ( 13 Sep 2019 12:54 )   PT: 15.6 sec;   INR: 1.39 ratio         PTT - ( 13 Sep 2019 12:54 )  PTT:38.5 sec  CARDIAC MARKERS ( 13 Sep 2019 12:54 )  <0.015 ng/mL / x     / x     / x     / x          Urinalysis Basic - ( 13 Sep 2019 02:20 )    Color: Yellow / Appearance: Clear / S.015 / pH: x  Gluc: x / Ketone: Negative  / Bili: Negative / Urobili: Negative mg/dL   Blood: x / Protein: 15 mg/dL / Nitrite: Negative   Leuk Esterase: Negative / RBC: >50 /HPF / WBC 0-2   Sq Epi: x / Non Sq Epi: Occasional / Bacteria: Occasional        CAPILLARY BLOOD GLUCOSE    < from: 12 Lead ECG (19 @ 12:39) >  Ventricular Rate 94 BPM    Atrial Rate 94 BPM    P-R Interval 156 ms    QRS Duration 64 ms    Q-T Interval 352 ms    QTC Calculation(Bezet) 440 ms    P Axis 75 degrees    R Axis 77 degrees    T Axis 73 degrees    Diagnosis Line Normal sinus rhythm  Possible Left atrial enlargement  Nonspecific ST abnormality  Abnormal ECG  No previous ECGs available    < end of copied text >          RECENT CULTURES:    RADIOLOGY & ADDITIONAL TESTS:  < from: CT Chest No Cont (19 @ 16:04) >  FINDINGS:    LUNGS, AIRWAYS, MEDIASTINUM AND MIGDALIA: Large left suprahilar mass   measuring at least 6.4 x 4.5 cm which demonstrates marked extrinsic   compression on the left upper lobe bronchus and extends into the   mediastinal fat and left upper lobe parenchyma compatible with   malignancy. Left apical nodular mass measuring 1.6 cm. Additional patchy   opacities in the left upper lobe with interlobular septal thickening may   represent a combination of metastatic spread of disease pulmonary venous   congestion and/or postobstructive pneumonia. Additional mediastinal and   hilar adenopathy. Severe emphysema. No pneumothorax. Right medial basilar   subsegmental atelectasis. Right apical pleural parenchymal scarring.    PLEURA: Small left pleural effusion.    VESSELS: Atherosclerotic changes of the aorta and coronary vasculature.    HEART: Heart size is normal. No pericardial effusion.    CHEST WALL AND LOWER NECK: Concern for left supraclavicular adenopathy.   The left apical nodular density extends into the medial left apical   pleural fat.    VISUALIZED UPPER ABDOMEN: Hepatomegaly.    BONES: Degenerative changes.    IMPRESSION:     Left apical nodular opacity with a conglomerate left hilar mass   compatible with malignancy till proven otherwise. Extrinsic narrowing of   the left upper lobe bronchus with additional patchy nodularity and   interlobular septal thickening in the left upper lobe may represent   lymphangitic spread disease and/or pulmonary venous   congestion/postobstructive pneumonia. This mass extends into the   pericardial and mediastinal fat.    Concern for left supraclavicular adenopathy.      < end of copied text >    Current medications:  ALBUTerol/ipratropium for Nebulization 3 milliLiter(s) Nebulizer every 6 hours  ALPRAZolam 0.25 milliGRAM(s) Oral two times a day PRN  aluminum hydroxide/magnesium hydroxide/simethicone Suspension 30 milliLiter(s) Oral every 4 hours PRN  aspirin enteric coated 81 milliGRAM(s) Oral daily  atorvastatin 40 milliGRAM(s) Oral at bedtime  buDESOnide 160 MICROgram(s)/formoterol 4.5 MICROgram(s) Inhaler 2 Puff(s) Inhalation two times a day  dicyclomine 10 milliGRAM(s) Oral three times a day before meals  diltiazem    milliGRAM(s) Oral daily  diphenhydrAMINE 50 milliGRAM(s) Oral at bedtime PRN  gabapentin 300 milliGRAM(s) Oral four times a day  heparin  Injectable 5000 Unit(s) SubCutaneous every 12 hours  influenza   Vaccine 0.5 milliLiter(s) IntraMuscular once  lidocaine   Patch 2 Patch Transdermal daily  lidocaine   Patch 1 Patch Transdermal daily  losartan 50 milliGRAM(s) Oral daily  ondansetron Injectable 4 milliGRAM(s) IV Push every 6 hours PRN  oxyCODONE    5 mG/acetaminophen 325 mG 1 Tablet(s) Oral every 8 hours PRN  oxyCODONE    IR 10 milliGRAM(s) Oral every 4 hours PRN  polyethylene glycol 3350 17 Gram(s) Oral daily PRN  senna 2 Tablet(s) Oral at bedtime

## 2019-09-14 NOTE — PROGRESS NOTE ADULT - ASSESSMENT
a/p:    * Left upper lobe lung mass with supraclavicular LAD   CT Chest   Pulmonary/Chest surgery evaluation  will need surgery for tissue biopsy   oncologist Dr. Campbell      *  LUE paresthesia likely due L brachial plexus compression  - pain management  - add Lidoderm patch and flexeril prn     *  Afib   Ekg: NSR  c/w Cardizem 120, stop losartan 50 - low BP   hold Apixaban prior to biopsy    * HTN   c/w CCB, hold losartan    * Severe COPD   stable  cw LABA    * DVT prophy   UFH Bid

## 2019-09-14 NOTE — PROGRESS NOTE ADULT - SUBJECTIVE AND OBJECTIVE BOX
Pt images reviewed.  JOSHUA mass extending onto hilum suspicious for malignancy with lymphangitic spread.  Would consider ultrasound neck to assess for ayse disease and possible IR bxto confirm dx.  Alternatively bronchoscopy would an option for tissue.

## 2019-09-15 LAB
ANION GAP SERPL CALC-SCNC: 7 MMOL/L — SIGNIFICANT CHANGE UP (ref 5–17)
BASOPHILS # BLD AUTO: 0.05 K/UL — SIGNIFICANT CHANGE UP (ref 0–0.2)
BASOPHILS NFR BLD AUTO: 0.5 % — SIGNIFICANT CHANGE UP (ref 0–2)
BUN SERPL-MCNC: 16 MG/DL — SIGNIFICANT CHANGE UP (ref 7–23)
CALCIUM SERPL-MCNC: 8.8 MG/DL — SIGNIFICANT CHANGE UP (ref 8.5–10.1)
CHLORIDE SERPL-SCNC: 108 MMOL/L — SIGNIFICANT CHANGE UP (ref 96–108)
CO2 SERPL-SCNC: 25 MMOL/L — SIGNIFICANT CHANGE UP (ref 22–31)
CREAT SERPL-MCNC: 0.79 MG/DL — SIGNIFICANT CHANGE UP (ref 0.5–1.3)
EOSINOPHIL # BLD AUTO: 0.45 K/UL — SIGNIFICANT CHANGE UP (ref 0–0.5)
EOSINOPHIL NFR BLD AUTO: 4.8 % — SIGNIFICANT CHANGE UP (ref 0–6)
GLUCOSE SERPL-MCNC: 94 MG/DL — SIGNIFICANT CHANGE UP (ref 70–99)
HCT VFR BLD CALC: 38 % — SIGNIFICANT CHANGE UP (ref 34.5–45)
HGB BLD-MCNC: 12.5 G/DL — SIGNIFICANT CHANGE UP (ref 11.5–15.5)
IMM GRANULOCYTES NFR BLD AUTO: 0.3 % — SIGNIFICANT CHANGE UP (ref 0–1.5)
LYMPHOCYTES # BLD AUTO: 2.8 K/UL — SIGNIFICANT CHANGE UP (ref 1–3.3)
LYMPHOCYTES # BLD AUTO: 29.7 % — SIGNIFICANT CHANGE UP (ref 13–44)
MCHC RBC-ENTMCNC: 31.6 PG — SIGNIFICANT CHANGE UP (ref 27–34)
MCHC RBC-ENTMCNC: 32.9 GM/DL — SIGNIFICANT CHANGE UP (ref 32–36)
MCV RBC AUTO: 96.2 FL — SIGNIFICANT CHANGE UP (ref 80–100)
MONOCYTES # BLD AUTO: 0.98 K/UL — HIGH (ref 0–0.9)
MONOCYTES NFR BLD AUTO: 10.4 % — SIGNIFICANT CHANGE UP (ref 2–14)
NEUTROPHILS # BLD AUTO: 5.13 K/UL — SIGNIFICANT CHANGE UP (ref 1.8–7.4)
NEUTROPHILS NFR BLD AUTO: 54.3 % — SIGNIFICANT CHANGE UP (ref 43–77)
PLATELET # BLD AUTO: 231 K/UL — SIGNIFICANT CHANGE UP (ref 150–400)
POTASSIUM SERPL-MCNC: 4.3 MMOL/L — SIGNIFICANT CHANGE UP (ref 3.5–5.3)
POTASSIUM SERPL-SCNC: 4.3 MMOL/L — SIGNIFICANT CHANGE UP (ref 3.5–5.3)
RBC # BLD: 3.95 M/UL — SIGNIFICANT CHANGE UP (ref 3.8–5.2)
RBC # FLD: 13.4 % — SIGNIFICANT CHANGE UP (ref 10.3–14.5)
SODIUM SERPL-SCNC: 140 MMOL/L — SIGNIFICANT CHANGE UP (ref 135–145)
WBC # BLD: 9.44 K/UL — SIGNIFICANT CHANGE UP (ref 3.8–10.5)
WBC # FLD AUTO: 9.44 K/UL — SIGNIFICANT CHANGE UP (ref 3.8–10.5)

## 2019-09-15 PROCEDURE — 99223 1ST HOSP IP/OBS HIGH 75: CPT

## 2019-09-15 RX ORDER — HYDROMORPHONE HYDROCHLORIDE 2 MG/ML
0.5 INJECTION INTRAMUSCULAR; INTRAVENOUS; SUBCUTANEOUS EVERY 4 HOURS
Refills: 0 | Status: DISCONTINUED | OUTPATIENT
Start: 2019-09-15 | End: 2019-09-16

## 2019-09-15 RX ORDER — OXYCODONE HYDROCHLORIDE 5 MG/1
5 TABLET ORAL EVERY 4 HOURS
Refills: 0 | Status: DISCONTINUED | OUTPATIENT
Start: 2019-09-15 | End: 2019-09-16

## 2019-09-15 RX ORDER — HYDROMORPHONE HYDROCHLORIDE 2 MG/ML
4 INJECTION INTRAMUSCULAR; INTRAVENOUS; SUBCUTANEOUS EVERY 4 HOURS
Refills: 0 | Status: DISCONTINUED | OUTPATIENT
Start: 2019-09-15 | End: 2019-09-16

## 2019-09-15 RX ORDER — HYDROMORPHONE HYDROCHLORIDE 2 MG/ML
0.5 INJECTION INTRAMUSCULAR; INTRAVENOUS; SUBCUTANEOUS THREE TIMES A DAY
Refills: 0 | Status: DISCONTINUED | OUTPATIENT
Start: 2019-09-15 | End: 2019-09-15

## 2019-09-15 RX ORDER — QUETIAPINE FUMARATE 200 MG/1
12.5 TABLET, FILM COATED ORAL EVERY 12 HOURS
Refills: 0 | Status: DISCONTINUED | OUTPATIENT
Start: 2019-09-15 | End: 2019-09-16

## 2019-09-15 RX ORDER — HYDROMORPHONE HYDROCHLORIDE 2 MG/ML
4 INJECTION INTRAMUSCULAR; INTRAVENOUS; SUBCUTANEOUS EVERY 4 HOURS
Refills: 0 | Status: DISCONTINUED | OUTPATIENT
Start: 2019-09-15 | End: 2019-09-15

## 2019-09-15 RX ORDER — ACETAMINOPHEN 500 MG
650 TABLET ORAL EVERY 12 HOURS
Refills: 0 | Status: DISCONTINUED | OUTPATIENT
Start: 2019-09-15 | End: 2019-09-16

## 2019-09-15 RX ORDER — HYDROMORPHONE HYDROCHLORIDE 2 MG/ML
0.5 INJECTION INTRAMUSCULAR; INTRAVENOUS; SUBCUTANEOUS ONCE
Refills: 0 | Status: DISCONTINUED | OUTPATIENT
Start: 2019-09-15 | End: 2019-09-15

## 2019-09-15 RX ADMIN — GABAPENTIN 300 MILLIGRAM(S): 400 CAPSULE ORAL at 11:06

## 2019-09-15 RX ADMIN — Medication 120 MILLIGRAM(S): at 06:27

## 2019-09-15 RX ADMIN — Medication 3 MILLILITER(S): at 08:38

## 2019-09-15 RX ADMIN — LIDOCAINE 2 PATCH: 4 CREAM TOPICAL at 11:06

## 2019-09-15 RX ADMIN — HYDROMORPHONE HYDROCHLORIDE 0.5 MILLIGRAM(S): 2 INJECTION INTRAMUSCULAR; INTRAVENOUS; SUBCUTANEOUS at 10:07

## 2019-09-15 RX ADMIN — Medication 10 MILLIGRAM(S): at 17:41

## 2019-09-15 RX ADMIN — HYDROMORPHONE HYDROCHLORIDE 0.5 MILLIGRAM(S): 2 INJECTION INTRAMUSCULAR; INTRAVENOUS; SUBCUTANEOUS at 21:32

## 2019-09-15 RX ADMIN — Medication 650 MILLIGRAM(S): at 17:41

## 2019-09-15 RX ADMIN — LIDOCAINE 1 PATCH: 4 CREAM TOPICAL at 11:06

## 2019-09-15 RX ADMIN — HYDROMORPHONE HYDROCHLORIDE 0.5 MILLIGRAM(S): 2 INJECTION INTRAMUSCULAR; INTRAVENOUS; SUBCUTANEOUS at 10:17

## 2019-09-15 RX ADMIN — Medication 3 MILLILITER(S): at 14:12

## 2019-09-15 RX ADMIN — Medication 81 MILLIGRAM(S): at 11:06

## 2019-09-15 RX ADMIN — SENNA PLUS 2 TABLET(S): 8.6 TABLET ORAL at 21:37

## 2019-09-15 RX ADMIN — GABAPENTIN 300 MILLIGRAM(S): 400 CAPSULE ORAL at 17:41

## 2019-09-15 RX ADMIN — BUDESONIDE AND FORMOTEROL FUMARATE DIHYDRATE 2 PUFF(S): 160; 4.5 AEROSOL RESPIRATORY (INHALATION) at 20:18

## 2019-09-15 RX ADMIN — LIDOCAINE 1 PATCH: 4 CREAM TOPICAL at 19:40

## 2019-09-15 RX ADMIN — ATORVASTATIN CALCIUM 40 MILLIGRAM(S): 80 TABLET, FILM COATED ORAL at 21:32

## 2019-09-15 RX ADMIN — BUDESONIDE AND FORMOTEROL FUMARATE DIHYDRATE 2 PUFF(S): 160; 4.5 AEROSOL RESPIRATORY (INHALATION) at 08:38

## 2019-09-15 RX ADMIN — Medication 3 MILLILITER(S): at 00:35

## 2019-09-15 RX ADMIN — Medication 10 MILLIGRAM(S): at 11:06

## 2019-09-15 RX ADMIN — Medication 3 MILLILITER(S): at 20:18

## 2019-09-15 RX ADMIN — Medication 10 MILLIGRAM(S): at 06:27

## 2019-09-15 RX ADMIN — HEPARIN SODIUM 5000 UNIT(S): 5000 INJECTION INTRAVENOUS; SUBCUTANEOUS at 06:27

## 2019-09-15 RX ADMIN — OXYCODONE HYDROCHLORIDE 10 MILLIGRAM(S): 5 TABLET ORAL at 06:27

## 2019-09-15 RX ADMIN — LIDOCAINE 1 PATCH: 4 CREAM TOPICAL at 23:12

## 2019-09-15 RX ADMIN — GABAPENTIN 300 MILLIGRAM(S): 400 CAPSULE ORAL at 06:27

## 2019-09-15 RX ADMIN — LIDOCAINE 2 PATCH: 4 CREAM TOPICAL at 19:40

## 2019-09-15 RX ADMIN — OXYCODONE HYDROCHLORIDE 10 MILLIGRAM(S): 5 TABLET ORAL at 00:58

## 2019-09-15 NOTE — CONSULT NOTE ADULT - SUBJECTIVE AND OBJECTIVE BOX
History of Present Illness:  85y Female admitted with arm discomfort and found to have left lung mass and supraclavicular adenopathy.  Asked for evaluation for dx    Past Medical History  Rheumatoid arthritis  Lung mass  HLD (hyperlipidemia)  HTN (hypertension)  COPD (chronic obstructive pulmonary disease)      Past Surgical History  Carotid stent occlusion, initial encounter  H/O hernia repair  Ectopic thyroid tissue      MEDICATIONS  (STANDING):  ALBUTerol/ipratropium for Nebulization 3 milliLiter(s) Nebulizer every 6 hours  aspirin enteric coated 81 milliGRAM(s) Oral daily  atorvastatin 40 milliGRAM(s) Oral at bedtime  buDESOnide 160 MICROgram(s)/formoterol 4.5 MICROgram(s) Inhaler 2 Puff(s) Inhalation two times a day  dicyclomine 10 milliGRAM(s) Oral three times a day before meals  diltiazem    milliGRAM(s) Oral daily  gabapentin 300 milliGRAM(s) Oral four times a day  heparin  Injectable 5000 Unit(s) SubCutaneous every 12 hours  influenza   Vaccine 0.5 milliLiter(s) IntraMuscular once  lidocaine   Patch 1 Patch Transdermal daily  lidocaine   Patch 2 Patch Transdermal daily  senna 2 Tablet(s) Oral at bedtime    MEDICATIONS  (PRN):  ALPRAZolam 0.25 milliGRAM(s) Oral two times a day PRN for anxiety, for insomnia  aluminum hydroxide/magnesium hydroxide/simethicone Suspension 30 milliLiter(s) Oral every 4 hours PRN Dyspepsia  cyclobenzaprine 5 milliGRAM(s) Oral three times a day PRN Muscle Spasm  diphenhydrAMINE 50 milliGRAM(s) Oral at bedtime PRN Insomnia  ondansetron Injectable 4 milliGRAM(s) IV Push every 6 hours PRN Nausea  oxyCODONE    5 mG/acetaminophen 325 mG 1 Tablet(s) Oral every 8 hours PRN for moderate pain  oxyCODONE    IR 10 milliGRAM(s) Oral every 4 hours PRN Severe Pain (7 - 10)  polyethylene glycol 3350 17 Gram(s) Oral daily PRN Constipation    Antiplatelet therapy:                           Last dose/amt:    Allergies: No Known Allergies      SOCIAL HISTORY:  Smoker: [ ] Yes  [x ] No        PACK YEARS:                         WHEN QUIT?  ETOH use: [ ] Yes  [x ] No              FREQUENCY / QUANTITY:  Ilicit Drug use:  [ ] Yes  [x ] No  Occupation:  Live with:  Assist device use:    Relevant Family History  FAMILY HISTORY:      Review of Systems neg except for HPI  GENERAL:  Fevers[] chills[] sweats[] fatigue[] weight loss[] weight gain []                                        NEURO:  parathesias[] seizures []  syncope []  confusion []                                                                                  EYES: glasses[]  blurry vision[]  discharge[] pain[] glaucoma []                                                                            ENMT:  difficulty hearing []  vertigo[]  dysphagia[] epistaxis[] recent dental work []                                      CV:  chest pain[] palpitations[] DUBOIS [] diaphoresis [] edema[]                                                                                             RESPIRATORY:  wheezing[] SOB[] cough [] sputum[] hemoptysis[]                                                                    GI:  nausea[]  vomiting []  diarrhea[] constipation [] melena []                                                                        : hematuria[ ]  dysuria[ ] urgency[] incontinence[]                                                                                              MUSKULOSKELETAL:  arthritis[ ]  joint swelling [ ] muscle weakness [ ]                                                                  SKIN/BREAST:  rash[ ] itching [ ]  hair loss[ ] masses[ ]                                                                                                PSYCH:  dementia [ ] depresion [ ] anxiety[ ]                                                                                                                  HEME/LYMPH:  bruises easily[ ] enlarged lymph nodes[ ] tender lymph nodes[ ]                                                 ENDOCRINE:  cold intolerance[ ] heat intolerance[ ] polydipsia[ ]                                                                              PHYSICAL EXAM  Vital Signs Last 24 Hrs  T(C): 36.7 (15 Sep 2019 05:07), Max: 36.9 (14 Sep 2019 20:15)  T(F): 98 (15 Sep 2019 05:07), Max: 98.5 (14 Sep 2019 20:15)  HR: 104 (15 Sep 2019 05:07) (83 - 104)  BP: 129/77 (15 Sep 2019 05:07) (91/65 - 129/77)  BP(mean): --  RR: 18 (15 Sep 2019 05:07) (18 - 19)  SpO2: 96% (15 Sep 2019 05:07) (91% - 96%)    General: Well nourished, well developed, no acute distress.                                                         Neuro: Normal exam oriented to person/place & time with no focal motor or sensory  deficits.                    Eyes: Normal exam of conjunctiva & lids, pupils equally reactive.   ENT: Normal exam of nasal/oral mucosa with absence of cyanosis.   Neck: lt supraclavicular fullness with suspected palpable adenopathy  Chest: Normal lung exam with good air movement absence of wheezes, rales, or rhonchi:                                                                          CV:  Auscultation: normal [ ] S3[ ] S4[ ] Irregular [ ] Rub[ ] Clicks[ ]  Murmurs none:[ ]systolic [ ]  diastolic [ ] holosystolic [ ]  Carotids: No Bruits[ ] Other____________ Abdominal Aorta: normal [ ] nonpalpable[ ]                                                                         GI: Normal exam of abdomen, liver & spleen with no noted masses or tenderness.                                                                                              Extremities: Normal no evidence of cyanosis or deformity Edema: none[x ]trace[ ]1+[ ]2+[ ]3+[ ]4+[ ]  Lower Extremity Pulses: Right[ ] Left[ ]Varicosities[ ]  SKIN : Normal exam to inspection & palation.                                                           LABS:                        12.5   9.44  )-----------( 231      ( 15 Sep 2019 06:48 )             38.0     09-15    140  |  108  |  16  ----------------------------<  94  4.3   |  25  |  0.79    Ca    8.8      15 Sep 2019 06:48  Mg     2.3     09-13    TPro  7.0  /  Alb  3.3  /  TBili  0.3  /  DBili  x   /  AST  24  /  ALT  22  /  AlkPhos  79  09-13    PT/INR - ( 13 Sep 2019 12:54 )   PT: 15.6 sec;   INR: 1.39 ratio         PTT - ( 13 Sep 2019 12:54 )  PTT:38.5 sec    CARDIAC MARKERS ( 13 Sep 2019 12:54 )  <0.015 ng/mL / x     / x     / x     / x              CXR:  CT Chest: as above        Assessment:  85y Female presents with Lung mass and adenopathy.  Would rec ultrasound of left neck and IR bx of nodes.  If cannot make diagnosis would need to proceed with bronchoscopy.      Plan:

## 2019-09-15 NOTE — PROGRESS NOTE ADULT - SUBJECTIVE AND OBJECTIVE BOX
Subjective:  Patient is a 85y old  Female who presents with a chief complaint of  left arm pain   HPI:      84 y/o WF with a PMHx of COPD, Left Lung mass, PAfib, RA previously on immunosuppressant tx, HLD, HTN, RA presents to the   on   c/o left arm pain for the past few months associated with some weakness and tingling of L hand. She called her PMD who directed her to the emergency room.     - Patient seen and examined at bedside earlier today,  reports upper left back and left arm pain , denies cp, + dyspnea at baseline due to COPD , afebrile, + BM   9/15- pt seen and examined, severe pain in am, slightly better after IV dilaudid, denies cp, + LUE and upper back pain, denies abdominal pain, awaiting biopsy     Review of system- Rest of the review of system are negative except mentioned in HPI    OBJECTIVE:   T(C): 36.8 (09-15-19 @ 12:17), Max: 36.9 (19 @ 20:15)  T(F): 98.2 (09-15-19 @ 12:17), Max: 98.5 (19 @ 20:15)  HR: 88 (09-15-19 @ 12:17) (83 - 104)  BP: 119/60 (09-15-19 @ 12:17) (92/74 - 129/77)  RR: 18 (09-15-19 @ 12:17) (18 - 18)  SpO2: 93% (09-15-19 @ 12:17) (93% - 96%)  Wt(kg): --    PHYSICAL EXAM:  GENERAL: NAD  NERVOUS SYSTEM:  Alert & Oriented X3, non- focal exam, Motor Strength 5/5 B/L upper and lower extremities; DTRs 2+ intact and symmetric  HEAD:  Atraumatic, Normocephalic  EYES: EOMI, PERRLA, conjunctiva and sclera clear  HEENT: Moist mucous membranes  NECK: Supple, No JVD  CHEST/LUNG: BS decreased bilaterally; No rales, no rhonchi, +  wheezing, no  rubs  HEART: Regular rate and rhythm; No murmurs, rubs, or gallops  ABDOMEN: Soft, Nontender, Nondistended; Bowel sounds present  GENITOURINARY- Voiding, no suprapubic tenderness  EXTREMITIES:  2+ Peripheral Pulses, No clubbing, cyanosis, or edema  MUSCULOSKELETAL:- No muscle tenderness, Muscle tone normal, LUE pain on movements   SKIN-no rash, no lesion    LABS:           09-15    140  |  108  |  16  ----------------------------<  94  4.3   |  25  |  0.79    Ca    8.8      15 Sep 2019 06:48                          12.5   9.44  )-----------( 231      ( 15 Sep 2019 06:48 )             38.0                          13.1   9.77  )-----------( 230      ( 13 Sep 2019 12:54 )             38.4         140  |  106  |  15  ----------------------------<  106<H>  4.3   |  27  |  0.82    Ca    9.2      13 Sep 2019 12:54  Mg     2.3         TPro  7.0  /  Alb  3.3  /  TBili  0.3  /  DBili  x   /  AST  24  /  ALT  22  /  AlkPhos  79      PT/INR - ( 13 Sep 2019 12:54 )   PT: 15.6 sec;   INR: 1.39 ratio         PTT - ( 13 Sep 2019 12:54 )  PTT:38.5 sec  CARDIAC MARKERS ( 13 Sep 2019 12:54 )  <0.015 ng/mL / x     / x     / x     / x          Urinalysis Basic - ( 13 Sep 2019 02:20 )    Color: Yellow / Appearance: Clear / S.015 / pH: x  Gluc: x / Ketone: Negative  / Bili: Negative / Urobili: Negative mg/dL   Blood: x / Protein: 15 mg/dL / Nitrite: Negative   Leuk Esterase: Negative / RBC: >50 /HPF / WBC 0-2   Sq Epi: x / Non Sq Epi: Occasional / Bacteria: Occasional        CAPILLARY BLOOD GLUCOSE    < from: 12 Lead ECG (19 @ 12:39) >  Ventricular Rate 94 BPM    Atrial Rate 94 BPM    P-R Interval 156 ms    QRS Duration 64 ms    Q-T Interval 352 ms    QTC Calculation(Bezet) 440 ms    P Axis 75 degrees    R Axis 77 degrees    T Axis 73 degrees    Diagnosis Line Normal sinus rhythm  Possible Left atrial enlargement  Nonspecific ST abnormality  Abnormal ECG  No previous ECGs available    < end of copied text >          RECENT CULTURES:    RADIOLOGY & ADDITIONAL TESTS:  < from: CT Chest No Cont (19 @ 16:04) >  FINDINGS:    LUNGS, AIRWAYS, MEDIASTINUM AND MIGDALIA: Large left suprahilar mass   measuring at least 6.4 x 4.5 cm which demonstrates marked extrinsic   compression on the left upper lobe bronchus and extends into the   mediastinal fat and left upper lobe parenchyma compatible with   malignancy. Left apical nodular mass measuring 1.6 cm. Additional patchy   opacities in the left upper lobe with interlobular septal thickening may   represent a combination of metastatic spread of disease pulmonary venous   congestion and/or postobstructive pneumonia. Additional mediastinal and   hilar adenopathy. Severe emphysema. No pneumothorax. Right medial basilar   subsegmental atelectasis. Right apical pleural parenchymal scarring.    PLEURA: Small left pleural effusion.    VESSELS: Atherosclerotic changes of the aorta and coronary vasculature.    HEART: Heart size is normal. No pericardial effusion.    CHEST WALL AND LOWER NECK: Concern for left supraclavicular adenopathy.   The left apical nodular density extends into the medial left apical   pleural fat.    VISUALIZED UPPER ABDOMEN: Hepatomegaly.    BONES: Degenerative changes.    IMPRESSION:     Left apical nodular opacity with a conglomerate left hilar mass   compatible with malignancy till proven otherwise. Extrinsic narrowing of   the left upper lobe bronchus with additional patchy nodularity and   interlobular septal thickening in the left upper lobe may represent   lymphangitic spread disease and/or pulmonary venous   congestion/postobstructive pneumonia. This mass extends into the   pericardial and mediastinal fat.    Concern for left supraclavicular adenopathy.      < end of copied text >    Current medications:  ALBUTerol/ipratropium for Nebulization 3 milliLiter(s) Nebulizer every 6 hours  ALPRAZolam 0.25 milliGRAM(s) Oral two times a day PRN  aluminum hydroxide/magnesium hydroxide/simethicone Suspension 30 milliLiter(s) Oral every 4 hours PRN  aspirin enteric coated 81 milliGRAM(s) Oral daily  atorvastatin 40 milliGRAM(s) Oral at bedtime  buDESOnide 160 MICROgram(s)/formoterol 4.5 MICROgram(s) Inhaler 2 Puff(s) Inhalation two times a day  dicyclomine 10 milliGRAM(s) Oral three times a day before meals  diltiazem    milliGRAM(s) Oral daily  diphenhydrAMINE 50 milliGRAM(s) Oral at bedtime PRN  gabapentin 300 milliGRAM(s) Oral four times a day  heparin  Injectable 5000 Unit(s) SubCutaneous every 12 hours  influenza   Vaccine 0.5 milliLiter(s) IntraMuscular once  lidocaine   Patch 2 Patch Transdermal daily  lidocaine   Patch 1 Patch Transdermal daily  losartan 50 milliGRAM(s) Oral daily  ondansetron Injectable 4 milliGRAM(s) IV Push every 6 hours PRN  oxyCODONE    5 mG/acetaminophen 325 mG 1 Tablet(s) Oral every 8 hours PRN  oxyCODONE    IR 10 milliGRAM(s) Oral every 4 hours PRN  polyethylene glycol 3350 17 Gram(s) Oral daily PRN  senna 2 Tablet(s) Oral at bedtime

## 2019-09-15 NOTE — PROGRESS NOTE ADULT - ASSESSMENT
a/p:    * Left upper lobe lung mass with supraclavicular LAD   CT Chest   Pulmonary/Chest surgery evaluation  plan for IR biopsy of left supraclavicular node-  coags in am, NPO form midnight   oncologist Dr. Campbell      *  LUE paresthesia  and severe pain , likely due L brachial plexus compression  - pain management  - add Lidoderm patch and flexeril prn   - dilaudid     *  Afib   Ekg: NSR  c/w Cardizem 120, stop losartan 50 - low BP   hold Apixaban prior to biopsy, restart after    * HTN   c/w CCB, hold losartan    * Severe COPD   stable  cw LABA    * DVT prophy   UFH Bid  - hold

## 2019-09-16 ENCOUNTER — RESULT REVIEW (OUTPATIENT)
Age: 84
End: 2019-09-16

## 2019-09-16 ENCOUNTER — TRANSCRIPTION ENCOUNTER (OUTPATIENT)
Age: 84
End: 2019-09-16

## 2019-09-16 VITALS
RESPIRATION RATE: 18 BRPM | HEART RATE: 88 BPM | DIASTOLIC BLOOD PRESSURE: 69 MMHG | OXYGEN SATURATION: 89 % | TEMPERATURE: 98 F | SYSTOLIC BLOOD PRESSURE: 127 MMHG

## 2019-09-16 LAB
HCT VFR BLD CALC: 36.1 % — SIGNIFICANT CHANGE UP (ref 34.5–45)
HGB BLD-MCNC: 11.9 G/DL — SIGNIFICANT CHANGE UP (ref 11.5–15.5)
INR BLD: 1.08 RATIO — SIGNIFICANT CHANGE UP (ref 0.88–1.16)
MCHC RBC-ENTMCNC: 31.8 PG — SIGNIFICANT CHANGE UP (ref 27–34)
MCHC RBC-ENTMCNC: 33 GM/DL — SIGNIFICANT CHANGE UP (ref 32–36)
MCV RBC AUTO: 96.5 FL — SIGNIFICANT CHANGE UP (ref 80–100)
PLATELET # BLD AUTO: 202 K/UL — SIGNIFICANT CHANGE UP (ref 150–400)
PROTHROM AB SERPL-ACNC: 12 SEC — SIGNIFICANT CHANGE UP (ref 10–12.9)
RBC # BLD: 3.74 M/UL — LOW (ref 3.8–5.2)
RBC # FLD: 13.2 % — SIGNIFICANT CHANGE UP (ref 10.3–14.5)
TSH SERPL-MCNC: 2.11 UU/ML — SIGNIFICANT CHANGE UP (ref 0.34–4.82)
VIT B12 SERPL-MCNC: 745 PG/ML — SIGNIFICANT CHANGE UP (ref 232–1245)
WBC # BLD: 7.55 K/UL — SIGNIFICANT CHANGE UP (ref 3.8–10.5)
WBC # FLD AUTO: 7.55 K/UL — SIGNIFICANT CHANGE UP (ref 3.8–10.5)

## 2019-09-16 PROCEDURE — 88305 TISSUE EXAM BY PATHOLOGIST: CPT | Mod: 26

## 2019-09-16 PROCEDURE — 99233 SBSQ HOSP IP/OBS HIGH 50: CPT

## 2019-09-16 PROCEDURE — 76942 ECHO GUIDE FOR BIOPSY: CPT | Mod: 26

## 2019-09-16 PROCEDURE — 88341 IMHCHEM/IMCYTCHM EA ADD ANTB: CPT | Mod: 26

## 2019-09-16 PROCEDURE — 38505 NEEDLE BIOPSY LYMPH NODES: CPT

## 2019-09-16 PROCEDURE — 88173 CYTOPATH EVAL FNA REPORT: CPT | Mod: 26

## 2019-09-16 PROCEDURE — 88342 IMHCHEM/IMCYTCHM 1ST ANTB: CPT | Mod: 26

## 2019-09-16 RX ORDER — HYDROMORPHONE HYDROCHLORIDE 2 MG/ML
1 INJECTION INTRAMUSCULAR; INTRAVENOUS; SUBCUTANEOUS
Qty: 40 | Refills: 0
Start: 2019-09-16 | End: 2019-09-25

## 2019-09-16 RX ORDER — NICOTINE POLACRILEX 2 MG
1 GUM BUCCAL
Qty: 14 | Refills: 0
Start: 2019-09-16 | End: 2019-09-29

## 2019-09-16 RX ORDER — POLYETHYLENE GLYCOL 3350 17 G/17G
17 POWDER, FOR SOLUTION ORAL
Qty: 1 | Refills: 0
Start: 2019-09-16 | End: 2019-09-25

## 2019-09-16 RX ORDER — CYCLOBENZAPRINE HYDROCHLORIDE 10 MG/1
1 TABLET, FILM COATED ORAL
Qty: 30 | Refills: 0
Start: 2019-09-16 | End: 2019-09-25

## 2019-09-16 RX ORDER — SENNA PLUS 8.6 MG/1
2 TABLET ORAL
Qty: 60 | Refills: 0
Start: 2019-09-16 | End: 2019-10-15

## 2019-09-16 RX ORDER — NICOTINE POLACRILEX 2 MG
1 GUM BUCCAL DAILY
Refills: 0 | Status: DISCONTINUED | OUTPATIENT
Start: 2019-09-16 | End: 2019-09-16

## 2019-09-16 RX ADMIN — HYDROMORPHONE HYDROCHLORIDE 0.5 MILLIGRAM(S): 2 INJECTION INTRAMUSCULAR; INTRAVENOUS; SUBCUTANEOUS at 09:57

## 2019-09-16 RX ADMIN — Medication 10 MILLIGRAM(S): at 17:04

## 2019-09-16 RX ADMIN — POLYETHYLENE GLYCOL 3350 17 GRAM(S): 17 POWDER, FOR SOLUTION ORAL at 12:46

## 2019-09-16 RX ADMIN — HYDROMORPHONE HYDROCHLORIDE 4 MILLIGRAM(S): 2 INJECTION INTRAMUSCULAR; INTRAVENOUS; SUBCUTANEOUS at 08:02

## 2019-09-16 RX ADMIN — LIDOCAINE 2 PATCH: 4 CREAM TOPICAL at 12:45

## 2019-09-16 RX ADMIN — LIDOCAINE 1 PATCH: 4 CREAM TOPICAL at 12:45

## 2019-09-16 RX ADMIN — Medication 10 MILLIGRAM(S): at 12:44

## 2019-09-16 RX ADMIN — GABAPENTIN 300 MILLIGRAM(S): 400 CAPSULE ORAL at 05:45

## 2019-09-16 RX ADMIN — HYDROMORPHONE HYDROCHLORIDE 0.5 MILLIGRAM(S): 2 INJECTION INTRAMUSCULAR; INTRAVENOUS; SUBCUTANEOUS at 14:07

## 2019-09-16 RX ADMIN — Medication 120 MILLIGRAM(S): at 05:46

## 2019-09-16 RX ADMIN — HYDROMORPHONE HYDROCHLORIDE 4 MILLIGRAM(S): 2 INJECTION INTRAMUSCULAR; INTRAVENOUS; SUBCUTANEOUS at 03:35

## 2019-09-16 RX ADMIN — HYDROMORPHONE HYDROCHLORIDE 4 MILLIGRAM(S): 2 INJECTION INTRAMUSCULAR; INTRAVENOUS; SUBCUTANEOUS at 08:34

## 2019-09-16 RX ADMIN — Medication 650 MILLIGRAM(S): at 17:04

## 2019-09-16 RX ADMIN — Medication 650 MILLIGRAM(S): at 05:45

## 2019-09-16 RX ADMIN — HYDROMORPHONE HYDROCHLORIDE 4 MILLIGRAM(S): 2 INJECTION INTRAMUSCULAR; INTRAVENOUS; SUBCUTANEOUS at 13:00

## 2019-09-16 RX ADMIN — OXYCODONE HYDROCHLORIDE 5 MILLIGRAM(S): 5 TABLET ORAL at 00:00

## 2019-09-16 RX ADMIN — Medication 3 MILLILITER(S): at 01:12

## 2019-09-16 RX ADMIN — Medication 3 MILLILITER(S): at 07:58

## 2019-09-16 RX ADMIN — GABAPENTIN 300 MILLIGRAM(S): 400 CAPSULE ORAL at 00:00

## 2019-09-16 RX ADMIN — HYDROMORPHONE HYDROCHLORIDE 4 MILLIGRAM(S): 2 INJECTION INTRAMUSCULAR; INTRAVENOUS; SUBCUTANEOUS at 13:30

## 2019-09-16 RX ADMIN — HYDROMORPHONE HYDROCHLORIDE 0.5 MILLIGRAM(S): 2 INJECTION INTRAMUSCULAR; INTRAVENOUS; SUBCUTANEOUS at 14:22

## 2019-09-16 RX ADMIN — BUDESONIDE AND FORMOTEROL FUMARATE DIHYDRATE 2 PUFF(S): 160; 4.5 AEROSOL RESPIRATORY (INHALATION) at 07:59

## 2019-09-16 RX ADMIN — Medication 81 MILLIGRAM(S): at 12:44

## 2019-09-16 RX ADMIN — QUETIAPINE FUMARATE 12.5 MILLIGRAM(S): 200 TABLET, FILM COATED ORAL at 01:55

## 2019-09-16 RX ADMIN — GABAPENTIN 300 MILLIGRAM(S): 400 CAPSULE ORAL at 17:04

## 2019-09-16 RX ADMIN — GABAPENTIN 300 MILLIGRAM(S): 400 CAPSULE ORAL at 12:44

## 2019-09-16 RX ADMIN — Medication 3 MILLILITER(S): at 13:28

## 2019-09-16 RX ADMIN — Medication 1 PATCH: at 12:44

## 2019-09-16 RX ADMIN — INFLUENZA VIRUS VACCINE 0.5 MILLILITER(S): 15; 15; 15; 15 SUSPENSION INTRAMUSCULAR at 13:13

## 2019-09-16 RX ADMIN — HYDROMORPHONE HYDROCHLORIDE 0.5 MILLIGRAM(S): 2 INJECTION INTRAMUSCULAR; INTRAVENOUS; SUBCUTANEOUS at 10:12

## 2019-09-16 NOTE — DISCHARGE NOTE NURSING/CASE MANAGEMENT/SOCIAL WORK - PATIENT PORTAL LINK FT
You can access the FollowMyHealth Patient Portal offered by [facility] by registering at the following website: [email or web address]. By joining [employer]’s FollowMyHealth portal, you will also be able to view your health information using other applications (apps) compatible with our system.

## 2019-09-16 NOTE — DISCHARGE NOTE PROVIDER - NSDCCPCAREPLAN_GEN_ALL_CORE_FT
PRINCIPAL DISCHARGE DIAGNOSIS  Diagnosis: Lung mass  Assessment and Plan of Treatment: s/p IR biopsy, follow up with Dr Campbell for biopsy results within 1 week      SECONDARY DISCHARGE DIAGNOSES  Diagnosis: Atrial fibrillation  Assessment and Plan of Treatment: restart eliquis    Diagnosis: HTN (hypertension)  Assessment and Plan of Treatment: stop losartan, take cardizem, follow up with PCP within 1 week    Diagnosis: COPD (chronic obstructive pulmonary disease)  Assessment and Plan of Treatment: take inhalers regularly, do no smoke, use nicotine patch, follow up with PCP within 1 week

## 2019-09-16 NOTE — BRIEF OPERATIVE NOTE - OPERATION/FINDINGS
1) L supraclavicular adenopathy on pre-US  2) 18G core x 6, adequate  3) No hematoma on final images

## 2019-09-16 NOTE — DISCHARGE NOTE PROVIDER - HOSPITAL COURSE
Patient is a 85y old  Female who presents with a chief complaint of  left arm pain     HPI:        86 y/o WF with a PMHx of COPD, Left Lung mass, PAfib, RA previously on immunosuppressant tx, HLD, HTN, RA presents to the   on 9/13  c/o left arm pain for the past few months associated with some weakness and tingling of L hand. She called her PMD who directed her to the emergency room.        9/14 - Patient seen and examined at bedside earlier today,  reports upper left back and left arm pain , denies cp, + dyspnea at baseline due to COPD , afebrile, + BM     9/15- pt seen and examined, severe pain in am, slightly better after IV dilaudid, denies cp, + LUE and upper back pain, denies abdominal pain, awaiting biopsy     9/16 - pt seen and examined, denies cp, pain better, eager to go home, afebrile, denies abdominal pain, POC discussed     Review of system- Rest of the review of system are negative except mentioned in HPI    T(C): 36.6 (09-16-19 @ 14:16), Max: 36.8 (09-15-19 @ 20:36)    T(F): 97.9 (09-16-19 @ 14:16), Max: 98.3 (09-15-19 @ 20:36)    HR: 88 (09-16-19 @ 14:16) (83 - 102)    BP: 127/69 (09-16-19 @ 14:16) (103/65 - 146/71)    RR: 18 (09-16-19 @ 14:16) (18 - 19)    SpO2: 89% (09-16-19 @ 14:16) (87% - 94%)    Wt(kg): --    GENERAL: NAD    NERVOUS SYSTEM:  Alert & Oriented X3, non- focal exam, Motor Strength 5/5 B/L upper and lower extremities; DTRs 2+ intact and symmetric    HEAD:  Atraumatic, Normocephalic    EYES: EOMI, PERRLA, conjunctiva and sclera clear    HEENT: Moist mucous membranes    NECK: Supple, No JVD    CHEST/LUNG: BS decreased bilaterally; No rales, no rhonchi, +  wheezing, no  rubs    HEART: Regular rate and rhythm; No murmurs, rubs, or gallops    ABDOMEN: Soft, Nontender, Nondistended; Bowel sounds present    GENITOURINARY- Voiding, no suprapubic tenderness    EXTREMITIES:  2+ Peripheral Pulses, No clubbing, cyanosis, or edema    MUSCULOSKELETAL:- No muscle tenderness, Muscle tone normal, LUE pain on movements     SKIN-no rash, no lesion        a/p:        * Left upper lobe lung mass with supraclavicular LAD     CT Chest     Pulmonary/Chest surgery evaluation    plan for IR biopsy of left supraclavicular node-    coags in am, NPO form midnight     oncologist Dr. Campbell          *  LUE paresthesia  and severe pain , likely due L brachial plexus compression    - pain management    - add Lidoderm patch and flexeril prn     - dilaudid         *  Afib     Ekg: NSR    c/w Cardizem 120, stop losartan 50 - low BP     hold Apixaban prior to biopsy, restart after        * HTN     c/w CCB, hold losartan        * Severe COPD     stable    cw LABA        Disposition - medically optimized to be discharged home with close follow up with PCP and oncologist within 1 week  for biopsy results    return to ED if fever, abdominal pain, nausea, vomiting, chest pain, dyspnea    Discharge plan discussed with patient, RN    Patient advised to follow up with PCP within 3-7 days    time spend 40 min    Discharge note faxed to PCP with my contact information to call me back     PCP Dr. Juarez

## 2019-09-16 NOTE — DISCHARGE NOTE NURSING/CASE MANAGEMENT/SOCIAL WORK - NSDCVIVACCINE_GEN_ALL_CORE_FT
Influenza , 2019/9/16 13:13 , Cecilia BarkdsaleRN) Influenza , 2019/9/16 13:13 , Cecilia BarksdaleRN)

## 2019-09-16 NOTE — PHYSICAL THERAPY INITIAL EVALUATION ADULT - MODALITIES TREATMENT COMMENTS
Pt wanted to return to sitting in bed, HOB elevated to eat lunch, +alarm, NAD, CBIR, No SOB noted, CM aware

## 2019-09-16 NOTE — DISCHARGE NOTE PROVIDER - NSDCFUADDINST_GEN_ALL_CORE_FT
IMPRESSION:     Left apical nodular opacity with a conglomerate left hilar mass   compatible with malignancy till proven otherwise. Extrinsic narrowing of   the left upper lobe bronchus with additional patchy nodularity and   interlobular septal thickening in the left upper lobe may represent   lymphangitic spread disease and/or pulmonary venous   congestion/postobstructive pneumonia. This mass extends into the   pericardial and mediastinal fat.    Concern for left supraclavicular adenopathy.      < end of copied text >

## 2019-09-16 NOTE — DISCHARGE NOTE PROVIDER - NSDCFUSCHEDAPPT_GEN_ALL_CORE_FT
LA NENA SPANGLER ; 09/23/2019 ; NPP Thor Surg [address]  LA NENA SPANGLER ; 11/15/2019 ; NPP Gastro [address] [facility] LA NENA SPANGLER ; 09/23/2019 ; NPP Thor Surg [address]  LA NENA SPANGLER ; 11/15/2019 ; NPP Gastro [address] [facility]

## 2019-09-16 NOTE — PROGRESS NOTE ADULT - SUBJECTIVE AND OBJECTIVE BOX
Subjective:  Patient has no new complaints.  Requesting IR biopsy versus surgical excision.     Vital Signs:  Vital Signs Last 24 Hrs  T(F): 97.8 - 98.3   HR: 87   BP: 146/71   RR: 19   SpO2: 91% on room air    Relevant labs, radiology and Medications reviewed                        11.9   7.55  )-----------( 202      ( 16 Sep 2019 06:42 )             36.1     09-15    140  |  108  |  16  ----------------------------<  94  4.3   |  25  |  0.79    Ca    8.8      15 Sep 2019 06:48      PT/INR - ( 16 Sep 2019 06:42 )   PT: 12.0 sec;   INR: 1.08 ratio           MEDICATIONS  (STANDING):  acetaminophen   Tablet .. 650 milliGRAM(s) Oral every 12 hours  ALBUTerol/ipratropium for Nebulization 3 milliLiter(s) Nebulizer every 6 hours  aspirin enteric coated 81 milliGRAM(s) Oral daily  atorvastatin 40 milliGRAM(s) Oral at bedtime  buDESOnide 160 MICROgram(s)/formoterol 4.5 MICROgram(s) Inhaler 2 Puff(s) Inhalation two times a day  dicyclomine 10 milliGRAM(s) Oral three times a day before meals  diltiazem    milliGRAM(s) Oral daily  gabapentin 300 milliGRAM(s) Oral four times a day  influenza   Vaccine 0.5 milliLiter(s) IntraMuscular once  lidocaine   Patch 1 Patch Transdermal daily  lidocaine   Patch 2 Patch Transdermal daily  nicotine -  14 mG/24Hr(s) Patch 1 patch Transdermal daily  senna 2 Tablet(s) Oral at bedtime    MEDICATIONS  (PRN):  ALPRAZolam 0.25 milliGRAM(s) Oral two times a day PRN for anxiety, for insomnia  aluminum hydroxide/magnesium hydroxide/simethicone Suspension 30 milliLiter(s) Oral every 4 hours PRN Dyspepsia  cyclobenzaprine 5 milliGRAM(s) Oral three times a day PRN Muscle Spasm  diphenhydrAMINE 50 milliGRAM(s) Oral at bedtime PRN Insomnia  HYDROmorphone   Tablet 4 milliGRAM(s) Oral every 4 hours PRN Severe Pain (7 - 10)  HYDROmorphone  Injectable 0.5 milliGRAM(s) IV Push every 4 hours PRN for breakthrogh  pain  ondansetron Injectable 4 milliGRAM(s) IV Push every 6 hours PRN Nausea  oxyCODONE    IR 5 milliGRAM(s) Oral every 4 hours PRN Moderate Pain (4 - 6)  polyethylene glycol 3350 17 Gram(s) Oral daily PRN Constipation  QUEtiapine 12.5 milliGRAM(s) Oral every 12 hours PRN agitation      Physical exam  Gen: NAD, short of breath with full sentences.  Neuro: AO x 3  Card: S1 S2  Pulm: Decreased breath sounds globally  Abd: Soft NT ND  Ext: No edema      Assessment  85y Female  w/ PAST MEDICAL & SURGICAL HISTORY:  Rheumatoid arthritis  Lung mass  HLD (hyperlipidemia)  HTN (hypertension)  COPD (chronic obstructive pulmonary disease)  Carotid stent occlusion, initial encounter  H/O hernia repair  Ectopic thyroid tissue  admitted with complaints of left arm pain     Has left upper lung mass with left supraclavicular lymphadenopathy.    PLAN    Spoke with team and patient.    She understands the risk of not having enough tissue for diagnosis and wants to undergo IR biopsy in order to be discharged today.  She can follow up with Dr. Renteria if not diagnostic to schedule an outpatient surgical excision.  Have her call [phone] to schedule an appointment after she receives her results.

## 2019-09-16 NOTE — DISCHARGE NOTE PROVIDER - CARE PROVIDER_API CALL
Mickey Juarez)  Cardiovascular Disease; Internal Medicine  [address] [facility], [address]  [place] [address]  Phone: [phone]  Fax: [phone]  Follow Up Time:     Rashel Campbell)  Internal Medicine  [address]  [place] [address]  Phone: [phone]  Fax: [phone]  Follow Up Time:

## 2019-09-16 NOTE — PHYSICAL THERAPY INITIAL EVALUATION ADULT - PERTINENT HX OF CURRENT PROBLEM, REHAB EVAL
84 y/o WF  presents to the ED c/o left arm pain for the past few months associated with some weakness and tingling of L hand. PMD referred to ED. COPD, Left Lung mass, PAfib, RA previously on immunosuppressant tx, HLD, HTN, RA

## 2019-09-16 NOTE — PHYSICAL THERAPY INITIAL EVALUATION ADULT - DIAGNOSIS, PT EVAL
weakness, found to have left lung mass and supraclavicular adenopathy.  LUE paresthesia  and severe pain , likely due L brachial plexus compression as per chart, Afib, HTN, COPD

## 2019-09-17 RX ORDER — IRBESARTAN 75 MG/1
1 TABLET ORAL
Qty: 0 | Refills: 0 | DISCHARGE

## 2019-09-17 RX ORDER — GABAPENTIN 400 MG/1
1 CAPSULE ORAL
Qty: 0 | Refills: 0 | DISCHARGE

## 2019-09-19 DIAGNOSIS — G54.0 BRACHIAL PLEXUS DISORDERS: ICD-10-CM

## 2019-09-19 DIAGNOSIS — J44.9 CHRONIC OBSTRUCTIVE PULMONARY DISEASE, UNSPECIFIED: ICD-10-CM

## 2019-09-19 DIAGNOSIS — F17.210 NICOTINE DEPENDENCE, CIGARETTES, UNCOMPLICATED: ICD-10-CM

## 2019-09-19 DIAGNOSIS — R91.8 OTHER NONSPECIFIC ABNORMAL FINDING OF LUNG FIELD: ICD-10-CM

## 2019-09-19 DIAGNOSIS — M06.9 RHEUMATOID ARTHRITIS, UNSPECIFIED: ICD-10-CM

## 2019-09-19 DIAGNOSIS — I10 ESSENTIAL (PRIMARY) HYPERTENSION: ICD-10-CM

## 2019-09-19 DIAGNOSIS — R59.0 LOCALIZED ENLARGED LYMPH NODES: ICD-10-CM

## 2019-09-19 DIAGNOSIS — Z79.899 OTHER LONG TERM (CURRENT) DRUG THERAPY: ICD-10-CM

## 2019-09-19 DIAGNOSIS — Z79.82 LONG TERM (CURRENT) USE OF ASPIRIN: ICD-10-CM

## 2019-09-19 DIAGNOSIS — I48.0 PAROXYSMAL ATRIAL FIBRILLATION: ICD-10-CM

## 2019-09-23 ENCOUNTER — APPOINTMENT (OUTPATIENT)
Dept: THORACIC SURGERY | Facility: CLINIC | Age: 84
End: 2019-09-23

## 2019-09-23 NOTE — HISTORY OF PRESENT ILLNESS
[FreeTextEntry1] : Ms. SPANGLER is an 85 year old female, heavy smoker referred by Dr. Rashel Campbell.  She recently had CT scan of her chest which reveal a left lung mass and mediastinal lymphadenopathy.  She then went for a PET scan that showed significant left supraclavicular lymphadenopathy.  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  She now presents for thoracic surgery evaluation.

## 2019-09-23 NOTE — CONSULT LETTER
[Dear  ___] : Dear  [unfilled], [Consult Letter:] : I had the pleasure of evaluating your patient, [unfilled]. [Please see my note below.] : Please see my note below. [Consult Closing:] : Thank you very much for allowing me to participate in the care of this patient.  If you have any questions, please do not hesitate to contact me. [FreeTextEntry2] : Rashel Campbell MD [Sincerely,] : Sincerely, [FreeTextEntry3] : Vic Meraz MD\par Director of Thoracic, [facility]\par Cardiovascular & Thoracic Surgery\par \par [facility] \par [address] [place] [address]\[place] [address]

## 2019-09-25 ENCOUNTER — INPATIENT (INPATIENT)
Facility: HOSPITAL | Age: 84
LOS: 6 days | Discharge: HOME HEALTH | DRG: 189 | End: 2019-10-02
Attending: FAMILY MEDICINE | Admitting: INTERNAL MEDICINE
Payer: MEDICARE

## 2019-09-25 VITALS
HEART RATE: 126 BPM | RESPIRATION RATE: 22 BRPM | OXYGEN SATURATION: 98 % | TEMPERATURE: 99 F | SYSTOLIC BLOOD PRESSURE: 118 MMHG | DIASTOLIC BLOOD PRESSURE: 93 MMHG

## 2019-09-25 DIAGNOSIS — Q89.2 CONGENITAL MALFORMATIONS OF OTHER ENDOCRINE GLANDS: Chronic | ICD-10-CM

## 2019-09-25 DIAGNOSIS — M06.9 RHEUMATOID ARTHRITIS, UNSPECIFIED: ICD-10-CM

## 2019-09-25 DIAGNOSIS — J44.9 CHRONIC OBSTRUCTIVE PULMONARY DISEASE, UNSPECIFIED: ICD-10-CM

## 2019-09-25 DIAGNOSIS — Z98.890 OTHER SPECIFIED POSTPROCEDURAL STATES: Chronic | ICD-10-CM

## 2019-09-25 DIAGNOSIS — T82.898A OTHER SPECIFIED COMPLICATION OF VASCULAR PROSTHETIC DEVICES, IMPLANTS AND GRAFTS, INITIAL ENCOUNTER: Chronic | ICD-10-CM

## 2019-09-25 PROBLEM — R91.8 OTHER NONSPECIFIC ABNORMAL FINDING OF LUNG FIELD: Chronic | Status: ACTIVE | Noted: 2019-09-13

## 2019-09-25 PROBLEM — E78.5 HYPERLIPIDEMIA, UNSPECIFIED: Chronic | Status: ACTIVE | Noted: 2019-09-13

## 2019-09-25 PROBLEM — I10 ESSENTIAL (PRIMARY) HYPERTENSION: Chronic | Status: ACTIVE | Noted: 2019-09-13

## 2019-09-25 LAB
ALBUMIN SERPL ELPH-MCNC: 3.3 G/DL — SIGNIFICANT CHANGE UP (ref 3.3–5)
ALP SERPL-CCNC: 82 U/L — SIGNIFICANT CHANGE UP (ref 40–120)
ALT FLD-CCNC: 25 U/L — SIGNIFICANT CHANGE UP (ref 12–78)
ANION GAP SERPL CALC-SCNC: 9 MMOL/L — SIGNIFICANT CHANGE UP (ref 5–17)
APTT BLD: 37.5 SEC — HIGH (ref 27.5–36.3)
AST SERPL-CCNC: 22 U/L — SIGNIFICANT CHANGE UP (ref 15–37)
BILIRUB SERPL-MCNC: 0.3 MG/DL — SIGNIFICANT CHANGE UP (ref 0.2–1.2)
BUN SERPL-MCNC: 15 MG/DL — SIGNIFICANT CHANGE UP (ref 7–23)
CALCIUM SERPL-MCNC: 9.2 MG/DL — SIGNIFICANT CHANGE UP (ref 8.5–10.1)
CHLORIDE SERPL-SCNC: 105 MMOL/L — SIGNIFICANT CHANGE UP (ref 96–108)
CO2 SERPL-SCNC: 25 MMOL/L — SIGNIFICANT CHANGE UP (ref 22–31)
CREAT SERPL-MCNC: 0.86 MG/DL — SIGNIFICANT CHANGE UP (ref 0.5–1.3)
GLUCOSE SERPL-MCNC: 105 MG/DL — HIGH (ref 70–99)
HCT VFR BLD CALC: 44.5 % — SIGNIFICANT CHANGE UP (ref 34.5–45)
HGB BLD-MCNC: 14.7 G/DL — SIGNIFICANT CHANGE UP (ref 11.5–15.5)
INR BLD: 1.3 RATIO — HIGH (ref 0.88–1.16)
MCHC RBC-ENTMCNC: 31.9 PG — SIGNIFICANT CHANGE UP (ref 27–34)
MCHC RBC-ENTMCNC: 33 GM/DL — SIGNIFICANT CHANGE UP (ref 32–36)
MCV RBC AUTO: 96.5 FL — SIGNIFICANT CHANGE UP (ref 80–100)
PLATELET # BLD AUTO: 269 K/UL — SIGNIFICANT CHANGE UP (ref 150–400)
POTASSIUM SERPL-MCNC: 4.5 MMOL/L — SIGNIFICANT CHANGE UP (ref 3.5–5.3)
POTASSIUM SERPL-SCNC: 4.5 MMOL/L — SIGNIFICANT CHANGE UP (ref 3.5–5.3)
PROT SERPL-MCNC: 7.1 GM/DL — SIGNIFICANT CHANGE UP (ref 6–8.3)
PROTHROM AB SERPL-ACNC: 14.6 SEC — HIGH (ref 10–12.9)
RBC # BLD: 4.61 M/UL — SIGNIFICANT CHANGE UP (ref 3.8–5.2)
RBC # FLD: 13.6 % — SIGNIFICANT CHANGE UP (ref 10.3–14.5)
SODIUM SERPL-SCNC: 139 MMOL/L — SIGNIFICANT CHANGE UP (ref 135–145)
TROPONIN I SERPL-MCNC: <0.015 NG/ML — SIGNIFICANT CHANGE UP (ref 0.01–0.04)
WBC # BLD: 11.96 K/UL — HIGH (ref 3.8–10.5)
WBC # FLD AUTO: 11.96 K/UL — HIGH (ref 3.8–10.5)

## 2019-09-25 PROCEDURE — 93010 ELECTROCARDIOGRAM REPORT: CPT

## 2019-09-25 PROCEDURE — 80053 COMPREHEN METABOLIC PANEL: CPT

## 2019-09-25 PROCEDURE — 85027 COMPLETE CBC AUTOMATED: CPT

## 2019-09-25 PROCEDURE — 94640 AIRWAY INHALATION TREATMENT: CPT

## 2019-09-25 PROCEDURE — 82962 GLUCOSE BLOOD TEST: CPT

## 2019-09-25 PROCEDURE — C9113: CPT

## 2019-09-25 PROCEDURE — 71045 X-RAY EXAM CHEST 1 VIEW: CPT | Mod: 26

## 2019-09-25 PROCEDURE — 83036 HEMOGLOBIN GLYCOSYLATED A1C: CPT

## 2019-09-25 PROCEDURE — 94760 N-INVAS EAR/PLS OXIMETRY 1: CPT

## 2019-09-25 PROCEDURE — 36415 COLL VENOUS BLD VENIPUNCTURE: CPT

## 2019-09-25 PROCEDURE — 80048 BASIC METABOLIC PNL TOTAL CA: CPT

## 2019-09-25 RX ORDER — IPRATROPIUM/ALBUTEROL SULFATE 18-103MCG
3 AEROSOL WITH ADAPTER (GRAM) INHALATION EVERY 6 HOURS
Refills: 0 | Status: DISCONTINUED | OUTPATIENT
Start: 2019-09-25 | End: 2019-10-02

## 2019-09-25 RX ORDER — ASPIRIN/CALCIUM CARB/MAGNESIUM 324 MG
81 TABLET ORAL DAILY
Refills: 0 | Status: DISCONTINUED | OUTPATIENT
Start: 2019-09-25 | End: 2019-10-02

## 2019-09-25 RX ORDER — IPRATROPIUM/ALBUTEROL SULFATE 18-103MCG
3 AEROSOL WITH ADAPTER (GRAM) INHALATION ONCE
Refills: 0 | Status: COMPLETED | OUTPATIENT
Start: 2019-09-25 | End: 2019-09-25

## 2019-09-25 RX ORDER — ALBUTEROL 90 UG/1
3 AEROSOL, METERED ORAL
Qty: 0 | Refills: 0 | DISCHARGE

## 2019-09-25 RX ORDER — DEXTROSE 50 % IN WATER 50 %
12.5 SYRINGE (ML) INTRAVENOUS ONCE
Refills: 0 | Status: DISCONTINUED | OUTPATIENT
Start: 2019-09-25 | End: 2019-10-02

## 2019-09-25 RX ORDER — DEXTROSE 50 % IN WATER 50 %
25 SYRINGE (ML) INTRAVENOUS ONCE
Refills: 0 | Status: DISCONTINUED | OUTPATIENT
Start: 2019-09-25 | End: 2019-10-02

## 2019-09-25 RX ORDER — LOSARTAN POTASSIUM 100 MG/1
50 TABLET, FILM COATED ORAL DAILY
Refills: 0 | Status: DISCONTINUED | OUTPATIENT
Start: 2019-09-25 | End: 2019-10-02

## 2019-09-25 RX ORDER — ATORVASTATIN CALCIUM 80 MG/1
40 TABLET, FILM COATED ORAL AT BEDTIME
Refills: 0 | Status: DISCONTINUED | OUTPATIENT
Start: 2019-09-25 | End: 2019-10-02

## 2019-09-25 RX ORDER — APIXABAN 2.5 MG/1
5 TABLET, FILM COATED ORAL EVERY 12 HOURS
Refills: 0 | Status: DISCONTINUED | OUTPATIENT
Start: 2019-09-25 | End: 2019-10-02

## 2019-09-25 RX ORDER — INSULIN LISPRO 100/ML
VIAL (ML) SUBCUTANEOUS
Refills: 0 | Status: DISCONTINUED | OUTPATIENT
Start: 2019-09-25 | End: 2019-10-02

## 2019-09-25 RX ORDER — DEXTROSE 50 % IN WATER 50 %
15 SYRINGE (ML) INTRAVENOUS ONCE
Refills: 0 | Status: DISCONTINUED | OUTPATIENT
Start: 2019-09-25 | End: 2019-10-02

## 2019-09-25 RX ORDER — DILTIAZEM HCL 120 MG
10 CAPSULE, EXT RELEASE 24 HR ORAL ONCE
Refills: 0 | Status: COMPLETED | OUTPATIENT
Start: 2019-09-25 | End: 2019-09-25

## 2019-09-25 RX ORDER — GLUCAGON INJECTION, SOLUTION 0.5 MG/.1ML
1 INJECTION, SOLUTION SUBCUTANEOUS ONCE
Refills: 0 | Status: DISCONTINUED | OUTPATIENT
Start: 2019-09-25 | End: 2019-10-02

## 2019-09-25 RX ORDER — SODIUM CHLORIDE 9 MG/ML
1000 INJECTION, SOLUTION INTRAVENOUS
Refills: 0 | Status: DISCONTINUED | OUTPATIENT
Start: 2019-09-25 | End: 2019-10-02

## 2019-09-25 RX ORDER — DILTIAZEM HCL 120 MG
120 CAPSULE, EXT RELEASE 24 HR ORAL DAILY
Refills: 0 | Status: DISCONTINUED | OUTPATIENT
Start: 2019-09-25 | End: 2019-10-02

## 2019-09-25 RX ORDER — DILTIAZEM HCL 120 MG
5 CAPSULE, EXT RELEASE 24 HR ORAL
Qty: 125 | Refills: 0 | Status: DISCONTINUED | OUTPATIENT
Start: 2019-09-25 | End: 2019-10-02

## 2019-09-25 RX ORDER — GABAPENTIN 400 MG/1
300 CAPSULE ORAL THREE TIMES A DAY
Refills: 0 | Status: DISCONTINUED | OUTPATIENT
Start: 2019-09-25 | End: 2019-10-02

## 2019-09-25 RX ORDER — MORPHINE SULFATE 50 MG/1
2 CAPSULE, EXTENDED RELEASE ORAL EVERY 4 HOURS
Refills: 0 | Status: DISCONTINUED | OUTPATIENT
Start: 2019-09-25 | End: 2019-09-26

## 2019-09-25 RX ORDER — SODIUM CHLORIDE 9 MG/ML
1000 INJECTION INTRAMUSCULAR; INTRAVENOUS; SUBCUTANEOUS ONCE
Refills: 0 | Status: COMPLETED | OUTPATIENT
Start: 2019-09-25 | End: 2019-09-25

## 2019-09-25 RX ORDER — BUDESONIDE AND FORMOTEROL FUMARATE DIHYDRATE 160; 4.5 UG/1; UG/1
2 AEROSOL RESPIRATORY (INHALATION)
Refills: 0 | Status: DISCONTINUED | OUTPATIENT
Start: 2019-09-25 | End: 2019-10-02

## 2019-09-25 RX ORDER — FENTANYL CITRATE 50 UG/ML
1 INJECTION INTRAVENOUS
Refills: 0 | Status: DISCONTINUED | OUTPATIENT
Start: 2019-09-25 | End: 2019-09-25

## 2019-09-25 RX ORDER — GABAPENTIN 400 MG/1
1 CAPSULE ORAL
Qty: 0 | Refills: 0 | DISCHARGE

## 2019-09-25 RX ADMIN — MORPHINE SULFATE 2 MILLIGRAM(S): 50 CAPSULE, EXTENDED RELEASE ORAL at 19:53

## 2019-09-25 RX ADMIN — SODIUM CHLORIDE 1000 MILLILITER(S): 9 INJECTION INTRAMUSCULAR; INTRAVENOUS; SUBCUTANEOUS at 13:26

## 2019-09-25 RX ADMIN — GABAPENTIN 300 MILLIGRAM(S): 400 CAPSULE ORAL at 22:21

## 2019-09-25 RX ADMIN — FENTANYL CITRATE 1 PATCH: 50 INJECTION INTRAVENOUS at 22:23

## 2019-09-25 RX ADMIN — APIXABAN 5 MILLIGRAM(S): 2.5 TABLET, FILM COATED ORAL at 18:27

## 2019-09-25 RX ADMIN — Medication 10 MILLIGRAM(S): at 13:33

## 2019-09-25 RX ADMIN — Medication 3 MILLILITER(S): at 19:50

## 2019-09-25 RX ADMIN — Medication 3 MILLILITER(S): at 13:26

## 2019-09-25 RX ADMIN — SODIUM CHLORIDE 1000 MILLILITER(S): 9 INJECTION INTRAMUSCULAR; INTRAVENOUS; SUBCUTANEOUS at 14:26

## 2019-09-25 RX ADMIN — Medication 60 MILLIGRAM(S): at 22:19

## 2019-09-25 RX ADMIN — Medication 125 MILLIGRAM(S): at 13:25

## 2019-09-25 RX ADMIN — ATORVASTATIN CALCIUM 40 MILLIGRAM(S): 80 TABLET, FILM COATED ORAL at 22:20

## 2019-09-25 RX ADMIN — BUDESONIDE AND FORMOTEROL FUMARATE DIHYDRATE 2 PUFF(S): 160; 4.5 AEROSOL RESPIRATORY (INHALATION) at 20:15

## 2019-09-25 NOTE — ED PROVIDER NOTE - PROGRESS NOTE DETAILS
Pt with rapid afib requiring iv medication and copd exacerbation requiring iv steroid and multiple nebulizer treatments with oxygen in between, will admit.

## 2019-09-25 NOTE — H&P ADULT - NSHPSOCIALHISTORY_GEN_ALL_CORE
no smoking, no drinking, no recreational drug abuse active smoking, no drinking, no recreational drug abuse

## 2019-09-25 NOTE — H&P ADULT - HISTORY OF PRESENT ILLNESS
84 y/o female developed worseness of shortness of breath today, did not get better with her usual meds of bronchodilator, associated with palpitation. She normally has shortness of breath. Aggravated by mild exertion, associated with cough, dry mostly, no fever.   she is chronic active smoker and still was smoking- counselled not to smoke

## 2019-09-25 NOTE — H&P ADULT - NSHPLABSRESULTS_GEN_ALL_CORE
14.7   11.96 )-----------( 269      ( 25 Sep 2019 13:17 )             44.5     09-25    139  |  105  |  15  ----------------------------<  105<H>  4.5   |  25  |  0.86    Ca    9.2      25 Sep 2019 13:17    TPro  7.1  /  Alb  3.3  /  TBili  0.3  /  DBili  x   /  AST  22  /  ALT  25  /  AlkPhos  82  09-25    CARDIAC MARKERS ( 25 Sep 2019 13:17 )  <0.015 ng/mL / x     / x     / x     / x          CAPILLARY BLOOD GLUCOSE        PT/INR - ( 25 Sep 2019 13:17 )   PT: 14.6 sec;   INR: 1.30 ratio         PTT - ( 25 Sep 2019 13:17 )  PTT:37.5 sec      PT/INR - ( 25 Sep 2019 13:17 )   PT: 14.6 sec;   INR: 1.30 ratio         PTT - ( 25 Sep 2019 13:17 )  PTT:37.5 sec    #EKG- AFib with RVR     #chest xray reviewed my myself- lung mass present on left side -upper lobe

## 2019-09-25 NOTE — H&P ADULT - NSHPREVIEWOFSYSTEMS_GEN_ALL_CORE
Review of Systems:  CONSTITUTIONAL: No weakness, fevers or chills  EYES/ENT: No visual changes;  No vertigo or throat pain   NECK: No pain or stiffness  RESPIRATORY: Shortness of breath +  CARDIOVASCULAR: No chest pain or palpitations  GASTROINTESTINAL: No abdominal or epigastric pain. No nausea, vomiting, or hematemesis; No diarrhea or constipation.   GENITOURINARY: No dysuria, frequency or hematuria  NEUROLOGICAL: No numbness or weakness  SKIN: No itching, burning, rashes, or lesions   All other review of systems is negative unless indicated above

## 2019-09-25 NOTE — ED PROVIDER NOTE - OBJECTIVE STATEMENT
86 y/o F with PMHx of RA, lung mass, HLD, HTN, COPD, s/p stent presenting to the ED c/o SOB. Pt went to  4 days ago and was told she "did not have air in her lungs." Pt then went home where SOB continued; worsening today. States that she was recently diagnosed with a lung tumor that is "pressing against a nerve." +smoker. Denies marijuana use, CP, or N/V/D. Cardiologist: Dr. Juarez.

## 2019-09-25 NOTE — ED ADULT TRIAGE NOTE - CHIEF COMPLAINT QUOTE
Patient sent in by home infusion RN for PICC line placement for outpatient antibiotics. shortness of breath for "5 or 6 days". hx of COPD. not on home oxygen. hypoxic in triage. direct bedded to main ER.

## 2019-09-25 NOTE — ED ADULT NURSE NOTE - NSIMPLEMENTINTERV_GEN_ALL_ED
Implemented All Fall Risk Interventions:  [place] to call system. Call bell, personal items and telephone within reach. Instruct patient to call for assistance. Room bathroom lighting operational. Non-slip footwear when patient is off stretcher. Physically safe environment: no spills, clutter or unnecessary equipment. Stretcher in lowest position, wheels locked, appropriate side rails in place. Provide visual cue, wrist band, yellow gown, etc. Monitor gait and stability. Monitor for mental status changes and reorient to person, place, and time. Review medications for side effects contributing to fall risk. Reinforce activity limits and safety measures with patient and family.

## 2019-09-25 NOTE — ED PROVIDER NOTE - CARDIAC, MLM
Normal rate, irregularly irregular rhythm.  Heart sounds S1, S2.  No murmurs, rubs or gallops. No edema.

## 2019-09-25 NOTE — H&P ADULT - ASSESSMENT
A/P    #Shortness of breath - due to combination of Afib with RVR , AECOPD and lung mass   -admit, iv steroids, duoneb, cardizem drip and po, tele monitoring  -palliative care evaluation   -oncology evaluation     #aFib - as above     #DVT pr - on DOAC A/P    #Shortness of breath - due to combination of Afib with RVR , AECOPD and lung mass   -admit, iv steroids, duoneb, cardizem drip and po, tele monitoring  -oncology evaluation   -pt does not want any aggressive measure but wants to be treated   -home with hospice care once stable to be discharge -discussed with her   -code status -discussed with her MOLST form filled - DNR and DNI     #aFib - as above   -cardizem drip for elevated heart rate-goal is 100     #DVT pr - on DOAC A/P    #Shortness of breath - due to combination of Afib with RVR , AECOPD and lung mass   -admit, iv steroids, duoneb, cardizem drip and po, tele monitoring  -pt does not want any aggressive measure but wants to be treated   -home with hospice care once stable to be discharge -discussed with her   -code status -discussed with her MOLST form filled - DNR and DNI     #aFib - as above   -cardizem drip for elevated heart rate-goal is 100     #DVT pr - on DOAC

## 2019-09-25 NOTE — ED ADULT NURSE REASSESSMENT NOTE - NS ED NURSE REASSESS COMMENT FT1
Patient A&Ox4, resting comfortably in bed. VSS, denies pain/discomfort at this time. Denies SOB, no s/s of distress present. NSR per tele monitor. Wait time explained, hospitalist consult pending. Safety & comfort measures in place, will continue to monitor.

## 2019-09-25 NOTE — H&P ADULT - NSICDXPASTMEDICALHX_GEN_ALL_CORE_FT
PAST MEDICAL HISTORY:  COPD (chronic obstructive pulmonary disease)     HLD (hyperlipidemia)     HTN (hypertension)     Lung mass     Rheumatoid arthritis

## 2019-09-25 NOTE — ED PROVIDER NOTE - CARE PLAN
Principal Discharge DX:	COPD (chronic obstructive pulmonary disease)  Secondary Diagnosis:	Atrial fibrillation

## 2019-09-25 NOTE — H&P ADULT - NSICDXPASTSURGICALHX_GEN_ALL_CORE_FT
PAST SURGICAL HISTORY:  Carotid stent occlusion, initial encounter     Ectopic thyroid tissue     H/O hernia repair

## 2019-09-25 NOTE — ED ADULT NURSE REASSESSMENT NOTE - NS ED NURSE REASSESS COMMENT FT1
Patient resting comfortably in bed. VSS, denies pain/discomfort. No s/s of distress present on my time. Hand-off report to RN Alisha, transport to . Safety & comfort measures in place, purposeful proactive rounding on my time.

## 2019-09-26 LAB — HBA1C BLD-MCNC: 5.8 % — HIGH (ref 4–5.6)

## 2019-09-26 RX ORDER — ALPRAZOLAM 0.25 MG
0.25 TABLET ORAL
Refills: 0 | Status: DISCONTINUED | OUTPATIENT
Start: 2019-09-26 | End: 2019-10-02

## 2019-09-26 RX ORDER — FENTANYL CITRATE 50 UG/ML
1 INJECTION INTRAVENOUS
Refills: 0 | Status: DISCONTINUED | OUTPATIENT
Start: 2019-09-26 | End: 2019-09-28

## 2019-09-26 RX ORDER — SENNA PLUS 8.6 MG/1
2 TABLET ORAL AT BEDTIME
Refills: 0 | Status: DISCONTINUED | OUTPATIENT
Start: 2019-09-26 | End: 2019-10-02

## 2019-09-26 RX ORDER — MORPHINE SULFATE 50 MG/1
4 CAPSULE, EXTENDED RELEASE ORAL EVERY 4 HOURS
Refills: 0 | Status: DISCONTINUED | OUTPATIENT
Start: 2019-09-26 | End: 2019-10-02

## 2019-09-26 RX ORDER — LANOLIN ALCOHOL/MO/W.PET/CERES
5 CREAM (GRAM) TOPICAL AT BEDTIME
Refills: 0 | Status: COMPLETED | OUTPATIENT
Start: 2019-09-26 | End: 2019-09-27

## 2019-09-26 RX ADMIN — SENNA PLUS 2 TABLET(S): 8.6 TABLET ORAL at 21:00

## 2019-09-26 RX ADMIN — Medication 3 MILLILITER(S): at 08:14

## 2019-09-26 RX ADMIN — Medication 1: at 17:38

## 2019-09-26 RX ADMIN — MORPHINE SULFATE 2 MILLIGRAM(S): 50 CAPSULE, EXTENDED RELEASE ORAL at 21:12

## 2019-09-26 RX ADMIN — MORPHINE SULFATE 2 MILLIGRAM(S): 50 CAPSULE, EXTENDED RELEASE ORAL at 12:55

## 2019-09-26 RX ADMIN — Medication 1: at 08:31

## 2019-09-26 RX ADMIN — LOSARTAN POTASSIUM 50 MILLIGRAM(S): 100 TABLET, FILM COATED ORAL at 06:51

## 2019-09-26 RX ADMIN — GABAPENTIN 300 MILLIGRAM(S): 400 CAPSULE ORAL at 06:49

## 2019-09-26 RX ADMIN — Medication 60 MILLIGRAM(S): at 21:00

## 2019-09-26 RX ADMIN — FENTANYL CITRATE 1 PATCH: 50 INJECTION INTRAVENOUS at 19:50

## 2019-09-26 RX ADMIN — Medication 81 MILLIGRAM(S): at 12:45

## 2019-09-26 RX ADMIN — Medication 120 MILLIGRAM(S): at 06:51

## 2019-09-26 RX ADMIN — MORPHINE SULFATE 2 MILLIGRAM(S): 50 CAPSULE, EXTENDED RELEASE ORAL at 13:55

## 2019-09-26 RX ADMIN — GABAPENTIN 300 MILLIGRAM(S): 400 CAPSULE ORAL at 21:02

## 2019-09-26 RX ADMIN — BUDESONIDE AND FORMOTEROL FUMARATE DIHYDRATE 2 PUFF(S): 160; 4.5 AEROSOL RESPIRATORY (INHALATION) at 19:46

## 2019-09-26 RX ADMIN — Medication 3 MILLILITER(S): at 01:29

## 2019-09-26 RX ADMIN — APIXABAN 5 MILLIGRAM(S): 2.5 TABLET, FILM COATED ORAL at 06:49

## 2019-09-26 RX ADMIN — Medication 60 MILLIGRAM(S): at 15:02

## 2019-09-26 RX ADMIN — APIXABAN 5 MILLIGRAM(S): 2.5 TABLET, FILM COATED ORAL at 17:39

## 2019-09-26 RX ADMIN — Medication 3 MILLILITER(S): at 14:42

## 2019-09-26 RX ADMIN — Medication 3 MILLILITER(S): at 19:45

## 2019-09-26 RX ADMIN — BUDESONIDE AND FORMOTEROL FUMARATE DIHYDRATE 2 PUFF(S): 160; 4.5 AEROSOL RESPIRATORY (INHALATION) at 08:10

## 2019-09-26 RX ADMIN — Medication 2: at 12:38

## 2019-09-26 RX ADMIN — FENTANYL CITRATE 1 PATCH: 50 INJECTION INTRAVENOUS at 12:45

## 2019-09-26 RX ADMIN — ATORVASTATIN CALCIUM 40 MILLIGRAM(S): 80 TABLET, FILM COATED ORAL at 20:59

## 2019-09-26 RX ADMIN — Medication 60 MILLIGRAM(S): at 06:49

## 2019-09-26 RX ADMIN — MORPHINE SULFATE 2 MILLIGRAM(S): 50 CAPSULE, EXTENDED RELEASE ORAL at 21:54

## 2019-09-26 RX ADMIN — GABAPENTIN 300 MILLIGRAM(S): 400 CAPSULE ORAL at 15:02

## 2019-09-26 NOTE — PROGRESS NOTE ADULT - ASSESSMENT
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  It consists  of three tan white soft tissue cores measuring 0.4-0.7 cm in  length and 0.1 cm in diameter.  Entirely submitted.  Two  cassettes. In addition, two slides are prepared intraoperatively.  Fresh tissue is held in RPMI.    In addition to other data that may appear on the specimen  container, the label has been inspected to confirm the presence  of the patient's name and date of birth.  Andria 09/17/2019 07:12    The fresh tissue is entirely submitted for permanent section in  one cassette on 09/18/19 08:54. NV    , PAfib, RA previously on immunosuppressant tx, HLD, HTN, RA presents to the ED c/o left arm pain for the past few months associated with some weakness and tingling of L hand. She called her PMD who directed her to the emergency room.    84 y/o female developed worseness of shortness of breath today, did not get better with her usual meds of bronchodilator, associated with palpitation. She normally has shortness of breath. Aggravated by mild exertion, associated with cough, dry mostly, no fever.   she is chronic active smoker and still was smoking- counselled not to smoke         a/p:    1. Left lung mass:  CT Chest   Pulmonary/Chest surgery evaluation    2. LUE paresthesia likely due L brachial plexus compression    3. Afib:   Ekg: NSR  c/w Cardizem  hold Apixaban prior to biopsy    4. Htn:   cw Losartan    5. Copd:  stable  cw LABA    6. DVT prophy:   [facility] Bid    Code status: *  Dispo: *  ELOS: *    All questions/concerns were discussed with patient/family by bedside.    Case d/w *    Total time spent: *min. 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    Case d/w *    Total time spent: *min. 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    1. Dysnpea; likely multifactoral  *Afib w/ RVR, COPD exacerbation, Small Cell Lung CA  - Cont. Symbicort/duonebs  - Cont. Solumedrol 60mg TID  - Cardizem gtt. dc'ed - on PO Cardizem  - Sinus Tachy on monitor   - Pt. previous seen [facility] heme/onc - patient endorses receiving radiation for this in the past. refusing any further treatments at this time.  - Consider palliative cx and hospice eval.   - Monitor 02 sats.     2. Acute Pain  *Likely 2/2 Lung CA  - Cont Morphine IV PRN  - Senna HS  - Istop #[id number] - copy in chart    3. HTN  - Cont. losartan     4. DVT PPX  - Eliquis     Code status: DNR/DNI- MOLST  Dispo: Tele  ELOS: 1-2 days    All questions/concerns were discussed with patient/family by bedside.  Case d/w RN, NP, CM/SW  Total time spent: 30min. 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    1. Dysnpea; likely multifactoral  *Afib w/ RVR, COPD exacerbation, Small Cell Lung CA  - Cont. Symbicort/duonebs  - Cont. Solumedrol 60mg TID  - Cardizem gtt. dc'ed - on PO Cardizem  - Sinus Tachy on monitor   - Pt. previous seen [facility] heme/onc - patient endorses receiving radiation for this in the past. refusing any further treatments at this time.  - Consider palliative cx and hospice eval.   - Monitor 02 sats.     2. Acute Pain  *Likely 2/2 Lung CA  - Cont Morphine IV PRN  - Senna HS  - Istop #[id number] - copy in chart    3. HTN  - Cont. losartan     4. HLD  - Cont. Statin     5. Anxiety  - Cont. xanax PRN BID    6. DVT PPX  - Eliquis     Code status: DNR/DNI- MOLST  Dispo: Tele  ELOS: 1-2 days    All questions/concerns were discussed with patient/family by bedside.  Case d/w RN, NP, CM/SW  Total time spent: 30min. 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    1. Dysnpea; likely multifactoral  *Afib w/ RVR, COPD exacerbation, Small Cell Lung CA  - Cont. Symbicort/duonebs  - Cont. Solumedrol 60mg TID  - Cardizem gtt. dc'ed - on PO Cardizem  - Sinus Tachy on monitor   - Pt. previous seen [facility] heme/onc - patient endorses receiving radiation for this in the past. refusing any further treatments at this time.  - Consider palliative cx and hospice eval.   - Monitor 02 sats.     2. Acute Pain  *Likely 2/2 Lung CA  - Cont Morphine IV PRN mod/severe pain  - Fentanyl 25mcg patch   - Senna HS  - Istop #[id number] - copy in chart    3. HTN  - Cont. losartan     4. HLD  - Cont. Statin     5. Anxiety  - Cont. xanax PRN BID    6. DVT PPX  - Eliquis     Code status: DNR/DNI- MOLST  Dispo: Tele  ELOS: 1-2 days    All questions/concerns were discussed with patient/family by bedside.  Case d/w RN, NP, CM/SW  Total time spent: 30min. 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    1. Dysnpea; likely multifactoral  *Afib w/ RVR, COPD exacerbation, Small Cell Lung CA  - Cont. Symbicort/duonebs  - Cont. Solumedrol 60mg TID  - Cardizem gtt. dc'ed - on PO Cardizem  - Sinus Tachy on monitor   - Pt. previous seen [facility] heme/onc - patient endorses receiving radiation for this in the past. refusing any further treatments at this time.  - I contacted MD Raya (radiation/onc) patient has not completed any radiation treatments at the office  - Heme/Onc f/u appreciated  - Consider palliative cx and hospice eval.   - Monitor 02 sats.     2. Acute Pain  *Likely 2/2 Lung CA  - Cont Morphine IV PRN mod/severe pain  - Fentanyl 25mcg patch   - Senna HS  - Istop #[id number] - copy in chart    3. HTN  - Cont. losartan     4. HLD  - Cont. Statin     5. Anxiety  - Cont. xanax PRN BID    6. DVT PPX  - Eliquis     Code status: DNR/DNI- MOLST  Dispo: Tele  ELOS: 1-2 days    All questions/concerns were discussed with patient/family by bedside.  Case d/w RN, NP, CM/SW  Total time spent: 30min. 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    1. Dysnpea; likely multifactoral  *Afib w/ RVR, COPD exacerbation, Small Cell Lung CA  - Cont. Symbicort/duonebs  - Cont. Solumedrol 60mg TID   - Cardizem gtt. dc'ed - on PO Cardizem.  On  Eliquis.    - Pt. previous seen [facility] heme/onc - patient endorses receiving radiation for this in the past. refusing any further treatments at this time.  - I contacted MD Raya (radiation/onc) patient has not completed any radiation treatments at the office  - Heme/Onc f/u appreciated  - Consider palliative cx and hospice eval.   - Monitor 02 sats.     2. Acute Pain  *Likely 2/2 Lung CA  - Cont Morphine IV PRN mod/severe pain  - Fentanyl 25mcg patch   - Senna HS  - Istop #[id number] - copy in chart    3. HTN  - Cont. losartan     4. HLD  - Cont. Statin     5. Anxiety  - Cont. xanax PRN BID    6. DVT PPX  - Eliquis     Code status: DNR/DNI- MOLST  Dispo: Tele  ELOS: 1-2 days    All questions/concerns were discussed with patient/family by bedside.  Case d/w RN, NP, CM/SW  Total time spent: 45min. More than 50% of time was spent in counseling, discussion of medications, and coordination of care

## 2019-09-26 NOTE — PROGRESS NOTE ADULT - SUBJECTIVE AND OBJECTIVE BOX
CC/reason for follow up: *    S: *    ROS: *    T(C): 36.4 (09-26-19 @ 05:58), Max: 37.1 (09-25-19 @ 13:11)  HR: 106 (09-26-19 @ 08:14) (94 - 126)  BP: 159/75 (09-26-19 @ 05:58) (105/63 - 159/75)  RR: 17 (09-26-19 @ 05:58) (16 - 22)  SpO2: 93% (09-26-19 @ 05:58) (87% - 98%)    Gen - Pleasant, cooperative, in no acute distress  HEENT- PERRL, moist mucus membranes, OP clear  CV - RRR, No m/r/g, +pulses, * edema  Lungs - Good effort, Clear to auscultation bilaterally  Abdomen - Soft, nontender, nondistended, +BS, No rebound/rigidity/guarding  Ext - No cyanosis/clubbing.   Skin - No rashes, No jaundice  Psych- Alert & oriented x 3  Neuro- *    ALBUTerol/ipratropium for Nebulization 3 milliLiter(s) Nebulizer every 6 hours  apixaban 5 milliGRAM(s) Oral every 12 hours  aspirin enteric coated 81 milliGRAM(s) Oral daily  atorvastatin 40 milliGRAM(s) Oral at bedtime  buDESOnide 160 MICROgram(s)/formoterol 4.5 MICROgram(s) Inhaler 2 Puff(s) Inhalation two times a day  dextrose 40% Gel 15 Gram(s) Oral once PRN  dextrose 5%. 1000 milliLiter(s) IV Continuous <Continuous>  dextrose 50% Injectable 12.5 Gram(s) IV Push once  dextrose 50% Injectable 25 Gram(s) IV Push once  dextrose 50% Injectable 25 Gram(s) IV Push once  diltiazem    milliGRAM(s) Oral daily  diltiazem Infusion 5 mG/Hr IV Continuous <Continuous>  gabapentin 300 milliGRAM(s) Oral three times a day  glucagon  Injectable 1 milliGRAM(s) IntraMuscular once PRN  insulin lispro (HumaLOG) corrective regimen sliding scale   SubCutaneous three times a day before meals  losartan 50 milliGRAM(s) Oral daily  methylPREDNISolone sodium succinate Injectable 60 milliGRAM(s) IV Push three times a day  morphine  - Injectable 2 milliGRAM(s) IV Push every 4 hours PRN            Diagnostic studies: personally reviewed  *    Assessment and Plan:    *    Code status: *  Dispo: *  ELOS: *    All questions/concerns were discussed with patient/family by bedside.    Case d/w *    Total time spent: *min. More than 50% of time was spent in counseling, discussion of medications, and coordination of care CC/reason for follow up: SOB  Interval Events: No acute events overnight. Patient seen and examined at beside. C/o pain w/ inspiration.  ROS:  CV - Denies palpitations. C/o of chest pain with inspiration.   Respiratory - C/o of DUBOIS/rest, dry non-productive cough.      T(C): 36.4 (09-26-19 @ 05:58), Max: 37.1 (09-25-19 @ 13:11)  HR: 106 (09-26-19 @ 08:14) (94 - 126)  BP: 159/75 (09-26-19 @ 05:58) (105/63 - 159/75)  RR: 17 (09-26-19 @ 05:58) (16 - 22)  SpO2: 93% (09-26-19 @ 05:58) (87% - 98%)    Gen - Pleasant, cooperative, in no acute distress  HEENT- PERRL, moist mucus membranes  CV - RRR, No m/r/g, trace BLE edema  Lungs - Good effort, Clear to auscultation bilaterally, diminished BL  Abdomen - Soft, nontender, nondistended, +BS, No rebound/rigidity/guarding  Ext - No cyanosis/clubbing.   Skin - No rashes, No jaundice  Psych- Alert & oriented x 3  Neuro- fluent speech, no facial droop, EOMI. moves all extremities     MEDICATIONS  (STANDING):  ALBUTerol/ipratropium for Nebulization 3 milliLiter(s) Nebulizer every 6 hours  apixaban 5 milliGRAM(s) Oral every 12 hours  aspirin enteric coated 81 milliGRAM(s) Oral daily  atorvastatin 40 milliGRAM(s) Oral at bedtime  buDESOnide 160 MICROgram(s)/formoterol 4.5 MICROgram(s) Inhaler 2 Puff(s) Inhalation two times a day  dextrose 5%. 1000 milliLiter(s) (50 mL/Hr) IV Continuous <Continuous>  dextrose 50% Injectable 12.5 Gram(s) IV Push once  dextrose 50% Injectable 25 Gram(s) IV Push once  dextrose 50% Injectable 25 Gram(s) IV Push once  diltiazem    milliGRAM(s) Oral daily  diltiazem Infusion 5 mG/Hr (5 mL/Hr) IV Continuous <Continuous>  gabapentin 300 milliGRAM(s) Oral three times a day  insulin lispro (HumaLOG) corrective regimen sliding scale   SubCutaneous three times a day before meals  losartan 50 milliGRAM(s) Oral daily  methylPREDNISolone sodium succinate Injectable 60 milliGRAM(s) IV Push three times a day    MEDICATIONS  (PRN):  dextrose 40% Gel 15 Gram(s) Oral once PRN Blood Glucose LESS THAN 70 milliGRAM(s)/deciliter  glucagon  Injectable 1 milliGRAM(s) IntraMuscular once PRN Glucose LESS THAN 70 milligrams/deciliter  morphine  - Injectable 2 milliGRAM(s) IV Push every 4 hours PRN Moderate Pain (4 - 6)    Diagnostic studies: personally reviewed  < from: Xray Chest 1 View-PORTABLE IMMEDIATE (09.25.19 @ 14:19) >  IMPRESSION: Unchanged left upper lobe and hilar findings.    < end of copied text >    LABS: All Labs Reviewed:                        14.7   11.96 )-----------( 269      ( 25 Sep 2019 13:17 )             44.5     09-25    139  |  105  |  15  ----------------------------<  105<H>  4.5   |  25  |  0.86    Ca    9.2      25 Sep 2019 13:17    TPro  7.1  /  Alb  3.3  /  TBili  0.3  /  DBili  x   /  AST  22  /  ALT  25  /  AlkPhos  82  09-25    PT/INR - ( 25 Sep 2019 13:17 )   PT: 14.6 sec;   INR: 1.30 ratio         PTT - ( 25 Sep 2019 13:17 )  PTT:37.5 sec  CARDIAC MARKERS ( 25 Sep 2019 13:17 )  <0.015 ng/mL / x     / x     / x     / x CC/reason for follow up: SOB  Interval Events: No acute events overnight. Patient seen and examined at beside. C/o pain w/ inspiration.  Asking for fentanyl patch now.     ROS:  CV - Denies palpitations. C/o of chest pain with inspiration.   Respiratory - C/o of DUBOIS/rest, dry non-productive cough.    Vital Signs Last 24 Hrs  T(C): 36.4 (26 Sep 2019 05:58), Max: 36.9 (25 Sep 2019 15:56)  T(F): 97.6 (26 Sep 2019 05:58), Max: 98.5 (25 Sep 2019 15:56)  HR: 101 (26 Sep 2019 10:34) (98 - 110)  BP: 118/61 (26 Sep 2019 10:34) (118/61 - 159/75)  BP(mean): --  RR: 17 (26 Sep 2019 05:58) (16 - 17)  SpO2: 96% (26 Sep 2019 10:34) (92% - 96%) on 2LNC    Gen - Pleasant, cooperative, in no acute distress  HEENT- PERRL, moist mucus membranes  CV - RRR, No m/r/g, trace BLE edema  Lungs - Good effort, Clear to auscultation bilaterally, diminished BL  Abdomen - Soft, nontender, nondistended, +BS, No rebound/rigidity/guarding  Ext - No cyanosis/clubbing.   Skin - No rashes, No jaundice  Psych- Alert & oriented x 3  Neuro- fluent speech, no facial droop, EOMI. moves all extremities         iagnostic studies: personally reviewed  < from: Xray Chest 1 View-PORTABLE IMMEDIATE (09.25.19 @ 14:19) >  IMPRESSION: Unchanged left upper lobe and hilar findings.    < end of copied text >        LABS: All Labs Reviewed:                        14.7   11.96 )-----------( 269      ( 25 Sep 2019 13:17 )             44.5     09-25    139  |  105  |  15  ----------------------------<  105<H>  4.5   |  25  |  0.86    Ca    9.2      25 Sep 2019 13:17    TPro  7.1  /  Alb  3.3  /  TBili  0.3  /  DBili  x   /  AST  22  /  ALT  25  /  AlkPhos  82  09-25    PT/INR - ( 25 Sep 2019 13:17 )   PT: 14.6 sec;   INR: 1.30 ratio         PTT - ( 25 Sep 2019 13:17 )  PTT:37.5 sec  CARDIAC MARKERS ( 25 Sep 2019 13:17 )  <0.015 ng/mL / x     / x     / x     / x            MEDICATIONS  (STANDING):  ALBUTerol/ipratropium for Nebulization 3 milliLiter(s) Nebulizer every 6 hours  apixaban 5 milliGRAM(s) Oral every 12 hours  aspirin enteric coated 81 milliGRAM(s) Oral daily  atorvastatin 40 milliGRAM(s) Oral at bedtime  buDESOnide 160 MICROgram(s)/formoterol 4.5 MICROgram(s) Inhaler 2 Puff(s) Inhalation two times a day  dextrose 5%. 1000 milliLiter(s) (50 mL/Hr) IV Continuous <Continuous>  dextrose 50% Injectable 12.5 Gram(s) IV Push once  dextrose 50% Injectable 25 Gram(s) IV Push once  dextrose 50% Injectable 25 Gram(s) IV Push once  diltiazem    milliGRAM(s) Oral daily  diltiazem Infusion 5 mG/Hr (5 mL/Hr) IV Continuous <Continuous>  fentaNYL   Patch  25 MICROgram(s)/Hr 1 Patch Transdermal every 72 hours  gabapentin 300 milliGRAM(s) Oral three times a day  insulin lispro (HumaLOG) corrective regimen sliding scale   SubCutaneous three times a day before meals  losartan 50 milliGRAM(s) Oral daily  methylPREDNISolone sodium succinate Injectable 60 milliGRAM(s) IV Push three times a day  senna 2 Tablet(s) Oral at bedtime    MEDICATIONS  (PRN):  ALPRAZolam 0.25 milliGRAM(s) Oral two times a day PRN Anxiety  dextrose 40% Gel 15 Gram(s) Oral once PRN Blood Glucose LESS THAN 70 milliGRAM(s)/deciliter  glucagon  Injectable 1 milliGRAM(s) IntraMuscular once PRN Glucose LESS THAN 70 milligrams/deciliter  morphine  - Injectable 4 milliGRAM(s) IV Push every 4 hours PRN Severe Pain (7 - 10)  morphine  - Injectable 2 milliGRAM(s) IV Push every 4 hours PRN Moderate Pain (4 - 6)

## 2019-09-26 NOTE — PROGRESS NOTE ADULT - ATTENDING COMMENTS
Patient seen and examined.  Reviewed and agree w/ H&P, A&P above.  Discussed w/ NP Findlay and changes made where necessary.

## 2019-09-26 NOTE — CONSULT NOTE ADULT - SUBJECTIVE AND OBJECTIVE BOX
Patient is a 85y old  Female who presents with a chief complaint of shortness of breath (27 Sep 2019 11:13)      HPI:  84 y/o female developed worseness of shortness of breath today, did not get better with her usual meds of bronchodilator, associated with palpitation. She normally has shortness of breath. Aggravated by mild exertion, associated with cough, dry mostly, no fever.   This is an 85­year­old female With an extensive smoking history greater than 50 pack years  who presents after recently undergoing CT imaging of the chest notable for mediastinal adenopathy as well as a large left hilar mass.   patient had a CT dated 6/26/2019 which suggested a 2 cm left lung lesion in the apex with interstitial prominence extending into the left  hilum 6 cm left hilar mass with direct extension into the mediastinum and left aortic pulmonic window with a mass encasing and  compressing the left pulmonary artery.  The patient was also noted to have enlarged mediastinal lymph nodes.  She denies any overt signs  of shortness of breath or difficulty breathing.  The patient was also noted to have severe pulmonary emphysema.  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  The patient denies any ongoing shortness of breath yet does have baseline dyspnea associated with her COPD.  she was seen on 9/20 and diagnosis of Small cell was relayed to patient. she was in severe pain and we discussed utilizing RT +/- chemo to improve her current pain symptoms.     I was called fo consult.   Upon entering room patient wished not to speak to me.       ROS:  Negative except for:    PAST MEDICAL & SURGICAL HISTORY:  Rheumatoid arthritis  Lung mass  HLD (hyperlipidemia)  HTN (hypertension)  COPD (chronic obstructive pulmonary disease)  Carotid stent occlusion, initial encounter  H/O hernia repair  Ectopic thyroid tissue      SOCIAL HISTORY:    FAMILY HISTORY:      MEDICATIONS  (STANDING):  ALBUTerol/ipratropium for Nebulization 3 milliLiter(s) Nebulizer every 6 hours  apixaban 5 milliGRAM(s) Oral every 12 hours  aspirin enteric coated 81 milliGRAM(s) Oral daily  atorvastatin 40 milliGRAM(s) Oral at bedtime  buDESOnide 160 MICROgram(s)/formoterol 4.5 MICROgram(s) Inhaler 2 Puff(s) Inhalation two times a day  dextrose 5%. 1000 milliLiter(s) (50 mL/Hr) IV Continuous <Continuous>  dextrose 50% Injectable 12.5 Gram(s) IV Push once  dextrose 50% Injectable 25 Gram(s) IV Push once  dextrose 50% Injectable 25 Gram(s) IV Push once  diltiazem    milliGRAM(s) Oral daily  diltiazem Infusion 5 mG/Hr (5 mL/Hr) IV Continuous <Continuous>  fentaNYL   Patch  25 MICROgram(s)/Hr 1 Patch Transdermal every 72 hours  gabapentin 300 milliGRAM(s) Oral three times a day  guaiFENesin  milliGRAM(s) Oral every 12 hours  insulin lispro (HumaLOG) corrective regimen sliding scale   SubCutaneous three times a day before meals  losartan 50 milliGRAM(s) Oral daily  methylPREDNISolone sodium succinate Injectable 60 milliGRAM(s) IV Push three times a day  senna 2 Tablet(s) Oral at bedtime    MEDICATIONS  (PRN):  ALPRAZolam 0.25 milliGRAM(s) Oral two times a day PRN Anxiety  dextrose 40% Gel 15 Gram(s) Oral once PRN Blood Glucose LESS THAN 70 milliGRAM(s)/deciliter  glucagon  Injectable 1 milliGRAM(s) IntraMuscular once PRN Glucose LESS THAN 70 milligrams/deciliter  morphine  - Injectable 4 milliGRAM(s) IV Push every 4 hours PRN Severe Pain (7 - 10)  morphine  - Injectable 2 milliGRAM(s) IV Push every 4 hours PRN Moderate Pain (4 - 6)  morphine Concentrate 5 milliGRAM(s) Oral every 4 hours PRN Moderate Pain (4 - 6) to Severe Pain (7 - 10)      Allergies    No Known Allergies    Intolerances        Vital Signs Last 24 Hrs  T(C): 36.5 (28 Sep 2019 05:00), Max: 36.7 (27 Sep 2019 10:09)  T(F): 97.7 (28 Sep 2019 05:00), Max: 98.1 (27 Sep 2019 10:09)  HR: 97 (28 Sep 2019 05:00) (80 - 108)  BP: 105/68 (28 Sep 2019 05:00) (105/68 - 133/69)  BP(mean): --  RR: 17 (28 Sep 2019 05:00) (16 - 18)  SpO2: 97% (28 Sep 2019 05:00) (93% - 97%)    PHYSICAL EXAM  General: adult in NAD  HEENT: clear oropharynx, anicteric sclera, pink conjunctiva  Neck: supple  CV: normal S1/S2 with no murmur rubs or gallops  Lungs: positive air movement b/l ant lungs,clear to auscultation, no wheezes, no rales  Abdomen: soft non-tender non-distended, no hepatosplenomegaly  Ext: no clubbing cyanosis or edema  Skin: no rashes and no petechiae  Neuro: alert and oriented X 4, no focal deficits      LABS:                          11.9   16.82 )-----------( 237      ( 28 Sep 2019 06:33 )             36.5         Mean Cell Volume : 96.8 fl  Mean Cell Hemoglobin : 31.6 pg  Mean Cell Hemoglobin Concentration : 32.6 gm/dL  Auto Neutrophil # : x  Auto Lymphocyte # : x  Auto Monocyte # : x  Auto Eosinophil # : x  Auto Basophil # : x  Auto Neutrophil % : x  Auto Lymphocyte % : x  Auto Monocyte % : x  Auto Eosinophil % : x  Auto Basophil % : x      Serial CBC's  09-28 @ 06:33  Hct-36.5 / Hgb-11.9 / Plat-237 / RBC-3.77 / WBC-16.82  Serial CBC's  09-27 @ 16:49  Hct-36.8 / Hgb-12.2 / Plat-246 / RBC-3.79 / WBC-20.91      09-28    141  |  109<H>  |  25<H>  ----------------------------<  155<H>  4.3   |  26  |  0.71    Ca    8.6      28 Sep 2019 06:33                        RADIOLOGY & ADDITIONAL STUDIES:

## 2019-09-26 NOTE — CONSULT NOTE ADULT - ASSESSMENT
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

## 2019-09-27 LAB
HCT VFR BLD CALC: 36.8 % — SIGNIFICANT CHANGE UP (ref 34.5–45)
HGB BLD-MCNC: 12.2 G/DL — SIGNIFICANT CHANGE UP (ref 11.5–15.5)
MCHC RBC-ENTMCNC: 32.2 PG — SIGNIFICANT CHANGE UP (ref 27–34)
MCHC RBC-ENTMCNC: 33.2 GM/DL — SIGNIFICANT CHANGE UP (ref 32–36)
MCV RBC AUTO: 97.1 FL — SIGNIFICANT CHANGE UP (ref 80–100)
PLATELET # BLD AUTO: 246 K/UL — SIGNIFICANT CHANGE UP (ref 150–400)
RBC # BLD: 3.79 M/UL — LOW (ref 3.8–5.2)
RBC # FLD: 14.2 % — SIGNIFICANT CHANGE UP (ref 10.3–14.5)
WBC # BLD: 20.91 K/UL — HIGH (ref 3.8–10.5)
WBC # FLD AUTO: 20.91 K/UL — HIGH (ref 3.8–10.5)

## 2019-09-27 RX ORDER — MORPHINE SULFATE 50 MG/1
5 CAPSULE, EXTENDED RELEASE ORAL EVERY 4 HOURS
Refills: 0 | Status: DISCONTINUED | OUTPATIENT
Start: 2019-09-27 | End: 2019-09-29

## 2019-09-27 RX ADMIN — GABAPENTIN 300 MILLIGRAM(S): 400 CAPSULE ORAL at 05:35

## 2019-09-27 RX ADMIN — GABAPENTIN 300 MILLIGRAM(S): 400 CAPSULE ORAL at 14:18

## 2019-09-27 RX ADMIN — Medication 0.25 MILLIGRAM(S): at 23:31

## 2019-09-27 RX ADMIN — APIXABAN 5 MILLIGRAM(S): 2.5 TABLET, FILM COATED ORAL at 17:26

## 2019-09-27 RX ADMIN — FENTANYL CITRATE 1 PATCH: 50 INJECTION INTRAVENOUS at 19:43

## 2019-09-27 RX ADMIN — Medication 3: at 17:28

## 2019-09-27 RX ADMIN — LOSARTAN POTASSIUM 50 MILLIGRAM(S): 100 TABLET, FILM COATED ORAL at 05:35

## 2019-09-27 RX ADMIN — ATORVASTATIN CALCIUM 40 MILLIGRAM(S): 80 TABLET, FILM COATED ORAL at 21:14

## 2019-09-27 RX ADMIN — Medication 3 MILLILITER(S): at 12:57

## 2019-09-27 RX ADMIN — MORPHINE SULFATE 5 MILLIGRAM(S): 50 CAPSULE, EXTENDED RELEASE ORAL at 23:35

## 2019-09-27 RX ADMIN — Medication 600 MILLIGRAM(S): at 17:26

## 2019-09-27 RX ADMIN — Medication 1: at 12:04

## 2019-09-27 RX ADMIN — Medication 120 MILLIGRAM(S): at 05:36

## 2019-09-27 RX ADMIN — MORPHINE SULFATE 5 MILLIGRAM(S): 50 CAPSULE, EXTENDED RELEASE ORAL at 16:18

## 2019-09-27 RX ADMIN — BUDESONIDE AND FORMOTEROL FUMARATE DIHYDRATE 2 PUFF(S): 160; 4.5 AEROSOL RESPIRATORY (INHALATION) at 20:41

## 2019-09-27 RX ADMIN — Medication 1: at 08:29

## 2019-09-27 RX ADMIN — Medication 60 MILLIGRAM(S): at 14:18

## 2019-09-27 RX ADMIN — Medication 3 MILLILITER(S): at 20:40

## 2019-09-27 RX ADMIN — SENNA PLUS 2 TABLET(S): 8.6 TABLET ORAL at 21:14

## 2019-09-27 RX ADMIN — APIXABAN 5 MILLIGRAM(S): 2.5 TABLET, FILM COATED ORAL at 05:35

## 2019-09-27 RX ADMIN — GABAPENTIN 300 MILLIGRAM(S): 400 CAPSULE ORAL at 21:14

## 2019-09-27 RX ADMIN — Medication 81 MILLIGRAM(S): at 11:34

## 2019-09-27 RX ADMIN — FENTANYL CITRATE 1 PATCH: 50 INJECTION INTRAVENOUS at 14:11

## 2019-09-27 RX ADMIN — Medication 60 MILLIGRAM(S): at 05:36

## 2019-09-27 RX ADMIN — Medication 60 MILLIGRAM(S): at 21:14

## 2019-09-27 RX ADMIN — Medication 5 MILLIGRAM(S): at 00:39

## 2019-09-27 RX ADMIN — Medication 3 MILLILITER(S): at 01:33

## 2019-09-27 RX ADMIN — MORPHINE SULFATE 5 MILLIGRAM(S): 50 CAPSULE, EXTENDED RELEASE ORAL at 11:32

## 2019-09-27 NOTE — CONSULT NOTE ADULT - ASSESSMENT
85y old Female coming from home with hx of COPD, Left Lung mass (biopsied on 9/16 found to be metastatic small cell carcinoma), Afib, RA previously on immunosuppressant tx, HLD, HTN, recently admitted 9/ for above dx, now admitted 9/25 for c/o worsening of shortness of breath w/ associated with palpitation. Found here to have COPD exacerbation c/b rapid afib (the latter resolved). Palliative Care consulted to assist with establishing GOC, as pt noted to be refusing cancer treatment.     1) Pain  2) Dyspnea  3) Anxiety  4) Rapid afib  5) Metastatic small cell ca  6) Debility  7) GOC/Advanced Directives    Thank you for including us in Ms. Juarez's care. Will continue to follow with you.    Douglas Mcclure MD  Palliative Care Attending 85y old Female coming from home with hx of COPD, Left Lung mass (biopsied on 9/16 found to be metastatic small cell carcinoma), Afib, RA previously on immunosuppressant tx, HLD, HTN, recently admitted 9/ for above dx, now admitted 9/25 for c/o worsening of shortness of breath w/ associated with palpitation. Found here to have COPD exacerbation c/b rapid afib (the latter resolved). Palliative Care consulted to assist with establishing GOC, as pt noted to be refusing cancer treatment.     1) Pain  - likely neuropathic pain from underlying metastatic disease with ayse involvement  - c/w gabapentin (already at a reasonable dose)  - c/w IV morphine 2mg/4mg q4h prn  - added roxanol 5mg q4h prn for longer-lasting relief  - on fentanyl 25mcg patch, placed yesterday  - discussed plan with primary team noting that if pt uses 4/5 breakthrough pain med doses in next 24h to inc fentanyl patch tomorrow to 37mcg    2) Dyspnea  - multifactorial- likely due to COPD exacerbation atop new lung cancer  - c/w nebs and bronchodilators  - c/w supplemental O2  - discussed the usefulness of opioids for air hunger with pt as well, may use morphine for this as well    3) Anxiety  - c/w xanax 0.25 mg, can liberalize to q8h prn    4) Rapid afib  - cardiology notes appreciated  - pt back in sinus rhythm with some paroxysmal afib  - no ACS signs as per cardio    5) Metastatic small cell ca  - reviewed onc impression from last admission  - was considering palliative chemo (Dr. Campbell) and RT (Dr. Raya)  - however pt refused this upon followup with Dr. Campbell 9/26  - as per pt, Dr. Juarez suggested second opinion which she agreed with. Pt open to speaking with suggested clinician Dr. Tay  - still seems unlikely that she will be accepting of chemo/RT, but she is at least willing to hear another opinion     6) Debility  - quite independent before this, but admits to walking with walker and her family being concerned about fall  - PPS 40-50% now due to sx  - evidence of malnutrition  - recommend nutrition/dietary consult  - also recommend PT since pt awaiting her walker at home and has concern about falls (suggest small dose of morphine beforehand due to DUBOIS)    7) GOC/Advanced Directives  - pt has capacity for decision making, is clear about her feelings, and reasonable enough to consider other opinions  - HCP on file naming her sister Lety Cardenas [phone]  - MOLST with DNR and DNI on the chart  - GOC meeting with pt and sister arranged for Monday at 10am to give them time to hear second onc opinion.    Thank you for including us in Ms. Juarez's care. Will continue to follow with you.    Douglas Mcclure MD  Palliative Care Attending

## 2019-09-27 NOTE — CDI QUERY NOTE - NSCDIOTHERTXTBX_GEN_ALL_CORE_HH
Patient admitted with COPD exacerbation/ AFIB and recent diagnosis of lung cancer  Patient with increased SOB  RESPIRATORY: Shortness of breath +  Respiratory: very minimal air entry on left side, wheeze present   hypoxic in triage    iv steroids, multiple nebulizer treatments and supplemental oxygen administered     In ER O2 sat = 87 % on room air. RR= 22  Initially placed on Nasal O2 - 4 liters  Now patient on 2-3 liters of O2  O2 sats with supplemental = 94 - 97 %    Please clarify if the above clinical indicators are indicative of a diagnosis  a) Acute hypoxic respiratory failure  b) Acute on chronic hypoxic respiratory failure ( not on home O2 )  c) No clinical indication of respiratory failure  d) Other, please clarify

## 2019-09-27 NOTE — CONSULT NOTE ADULT - SUBJECTIVE AND OBJECTIVE BOX
HPI: Ms. Juarez is a 85y old Female coming from home with hx of COPD, Left Lung mass (biopsied on 9/16 found to be metastatic small cell carcinoma), Afib, RA previously on immunosuppressant tx, HLD, HTN, recently admitted 9/ for above dx, now admitted 9/25 for c/o worsening of shortness of breath w/ associated with palpitation. Found here to have COPD exacerbation c/b rapid afib (the latter resolved). Palliative Care consulted to assist with establishing GOC, as pt noted to be refusing cancer treatment.     Met Ms. Juarez this am, no family at bedside. Pt notes significant pain in dominant (left) arm that was present prior to admission, feels like an ache. Pt notes this is 8/10 now, unsure of if pain meds work for this (cannot recall) though she is aware of being given IV morphine yesterday. She also notes taking oxycodone in the past with little effectiveness. She attributes this pain to her cancer and likely nerve compression. The only thing that helps is warm compresses. She also notes fentanyl patch was started yesterday, though she is unsure if this has kicked in yet. She was open to dose of pain meds now so she can pay attention to if this helps. Likewise pt also noting DUBOIS when she walks to bathroom, open to knowledge that morphine can be helpful for both pain and dyspnea.     Pt also endorses some anxiety, though is unable to recall getting anything for this. Encouraged use of anti-anxiety medications given the gravity of what is happened and tendency for anxiety to exacerbate other sx.     Pt was very clear about her understanding of her cancer diagnosis. She recalls having oncology (Dr. Campbell) come to see her yesterday, not feeling like he was the right fit for her, and ultimately not thinking that she wanted to risk the side effects that often come with chemo. However, she notes having a great relationship with her PMD Dr. Juarez who came to see her this am. She shares that they had a meaningful conversation, knowing that she is being offered these interventions because people want to help her. He convinced her to allow a second opinion from Dr. Tay, though she was noting this was meant to be outpt. However, given her sx and that Dr. Tay does see pts here in the hospital, she agreed that having team put in a consult while here so she could have his impression sooner rather than later made sense. She was also open to GOC meeting with her sister/HCP Luanne, noting that this was about her life and wanting to be heard. Encouraged her to allow GOC meeting so that her HCP, who she appointed to represent her wishes would be aware of what those wishes are. She agreed opting to call her sister during encounter so we could arrange this, sister agreed to come in at 10am on Monday, giving them time to get their second opinion, so we could be of most use to them with planning. In the interim, pt plans to continue to rely on her barbara in God to get her through.       PAIN: (x)Yes   ( )No  Level: mod-sev  Location: left arm and flank  Intensity:    8/10  Quality: aching  Aggravating Factors: position  Alleviating Factors: warm packs  Radiation: yes up and down arm  Duration/Timing: intermittent  Impact on ADLs: no    DYSPNEA: (x ) Yes  ( ) No  Level: mod with exertion    PAST MEDICAL & SURGICAL HISTORY:  Rheumatoid arthritis  Lung mass  HLD (hyperlipidemia)  HTN (hypertension)  COPD (chronic obstructive pulmonary disease)  Carotid stent occlusion, initial encounter  H/O hernia repair  Ectopic thyroid tissue      SOCIAL HX: Lives alone, has a sister and a brother, the latter has developmental delay and is very sick (she is his guardian, seems he lives in a home)    Hx opiate tolerance (x )YES  ( )NO    Baseline ADLs  (Prior to Admission)  ( x) Independent   ( )Dependent    FAMILY HISTORY:   Sibling with lung cancer    Review of Systems:    Anxiety- yes  Depression- no  Constipation- no, bm this am, no issues  Diarrhea- no  Nausea- no  Vomiting- no  Anorexia- no  Weight Loss- yes   Cough- yes  Secretions- no  Fatigue- at times  Weakness- at times, but walking and accomplishing all ADLs  Delirium- no    All other systems reviewed and negative      PHYSICAL EXAM:    Vital Signs Last 24 Hrs  T(C): 36.7 (27 Sep 2019 10:09), Max: 36.7 (27 Sep 2019 10:09)  T(F): 98.1 (27 Sep 2019 10:09), Max: 98.1 (27 Sep 2019 10:09)  HR: 80 (27 Sep 2019 10:09) (80 - 108)  BP: 133/69 (27 Sep 2019 10:09) (99/66 - 133/69)  RR: 16 (27 Sep 2019 10:09) (16 - 16)  SpO2: 95% (27 Sep 2019 10:09) (94% - 99%)    PPSV2: 30  %    General: Elderly female sitting up in bed, anxious, but pleasant, thin  Mental Status: AOx4  HEENT: mmm, perrl, nc in place, +temporal and clavicular wasting  Lungs: dec at bases bl  Cardiac: +s1 s2 tachy  GI: soft nt nd +bs  : voids  Ext: moves all extremities, muscle and fat wasting throughout  Neuro: no focal deficits, though some ttp along arm       LABS:                        14.7   11.96 )-----------( 269      ( 25 Sep 2019 13:17 )             44.5     09-25    139  |  105  |  15  ----------------------------<  105<H>  4.5   |  25  |  0.86    Ca    9.2      25 Sep 2019 13:17    TPro  7.1  /  Alb  3.3  /  TBili  0.3  /  DBili  x   /  AST  22  /  ALT  25  /  AlkPhos  82  09-25    PT/INR - ( 25 Sep 2019 13:17 )   PT: 14.6 sec;   INR: 1.30 ratio         PTT - ( 25 Sep 2019 13:17 )  PTT:37.5 sec  Albumin: Albumin, Serum: 3.3 g/dL (09-25 @ 13:17)      Allergies    No Known Allergies    Intolerances      MEDICATIONS  (STANDING):  ALBUTerol/ipratropium for Nebulization 3 milliLiter(s) Nebulizer every 6 hours  apixaban 5 milliGRAM(s) Oral every 12 hours  aspirin enteric coated 81 milliGRAM(s) Oral daily  atorvastatin 40 milliGRAM(s) Oral at bedtime  buDESOnide 160 MICROgram(s)/formoterol 4.5 MICROgram(s) Inhaler 2 Puff(s) Inhalation two times a day  dextrose 5%. 1000 milliLiter(s) (50 mL/Hr) IV Continuous <Continuous>  dextrose 50% Injectable 12.5 Gram(s) IV Push once  dextrose 50% Injectable 25 Gram(s) IV Push once  dextrose 50% Injectable 25 Gram(s) IV Push once  diltiazem    milliGRAM(s) Oral daily  diltiazem Infusion 5 mG/Hr (5 mL/Hr) IV Continuous <Continuous>  fentaNYL   Patch  25 MICROgram(s)/Hr 1 Patch Transdermal every 72 hours  gabapentin 300 milliGRAM(s) Oral three times a day  insulin lispro (HumaLOG) corrective regimen sliding scale   SubCutaneous three times a day before meals  losartan 50 milliGRAM(s) Oral daily  methylPREDNISolone sodium succinate Injectable 60 milliGRAM(s) IV Push three times a day  senna 2 Tablet(s) Oral at bedtime    MEDICATIONS  (PRN):  ALPRAZolam 0.25 milliGRAM(s) Oral two times a day PRN Anxiety  dextrose 40% Gel 15 Gram(s) Oral once PRN Blood Glucose LESS THAN 70 milliGRAM(s)/deciliter  glucagon  Injectable 1 milliGRAM(s) IntraMuscular once PRN Glucose LESS THAN 70 milligrams/deciliter  morphine  - Injectable 4 milliGRAM(s) IV Push every 4 hours PRN Severe Pain (7 - 10)  morphine  - Injectable 2 milliGRAM(s) IV Push every 4 hours PRN Moderate Pain (4 - 6)  morphine Concentrate 5 milliGRAM(s) Oral every 4 hours PRN mod to severe pain      RADIOLOGY/ADDITIONAL STUDIES: HPI: Ms. Juarez is a 85y old Female coming from home with hx of COPD, Left Lung mass (biopsied on 9/16 found to be metastatic small cell carcinoma), Afib, RA previously on immunosuppressant tx, HLD, HTN, recently admitted 9/ for above dx, now admitted 9/25 for c/o worsening of shortness of breath w/ associated with palpitation. Found here to have COPD exacerbation c/b rapid afib (the latter resolved). Palliative Care consulted to assist with establishing GOC, as pt noted to be refusing cancer treatment.     Met Ms. Juarez this am, no family at bedside. Pt notes significant pain in dominant (left) arm that was present prior to admission, feels like an ache. Pt notes this is 8/10 now, unsure of if pain meds work for this (cannot recall) though she is aware of being given IV morphine yesterday. She also notes taking oxycodone in the past with little effectiveness. She attributes this pain to her cancer and likely nerve compression. The only thing that helps is warm compresses. She also notes fentanyl patch was started yesterday, though she is unsure if this has kicked in yet. She was open to dose of pain meds now so she can pay attention to if this helps. Likewise pt also noting DUBOIS when she walks to bathroom, open to knowledge that morphine can be helpful for both pain and dyspnea.     Pt also endorses some anxiety, though is unable to recall getting anything for this. Encouraged use of anti-anxiety medications given the gravity of what is happened and tendency for anxiety to exacerbate other sx.     Pt was very clear about her understanding of her cancer diagnosis. She recalls having oncology (Dr. Campbell) come to see her yesterday, not feeling like he was the right fit for her, and ultimately not thinking that she wanted to risk the side effects that often come with chemo. However, she notes having a great relationship with her PMD Dr. Juarez who came to see her this am. She shares that they had a meaningful conversation, knowing that she is being offered these interventions because people want to help her. He convinced her to allow a second opinion from Dr. Tay, though she was noting this was meant to be outpt. However, given her sx and that Dr. Tay does see pts here in the hospital, she agreed that having team put in a consult while here so she could have his impression sooner rather than later made sense. She was also open to GOC meeting with her sister/HCP Luanne, noting that this was about her life and wanting to be heard. Encouraged her to allow GOC meeting so that her HCP, who she appointed to represent her wishes would be aware of what those wishes are. She agreed opting to call her sister during encounter so we could arrange this, sister agreed to come in at 10am on Monday, giving them time to get their second opinion, so we could be of most use to them with planning. In the interim, pt plans to continue to rely on her barbara in [employer] to get her through.       PAIN: (x)Yes   ( )No  Level: mod-sev  Location: left arm and flank  Intensity:    8/10  Quality: aching  Aggravating Factors: position  Alleviating Factors: warm packs  Radiation: yes up and down arm  Duration/Timing: intermittent  Impact on ADLs: no    DYSPNEA: (x ) Yes  ( ) No  Level: mod with exertion    PAST MEDICAL & SURGICAL HISTORY:  Rheumatoid arthritis  Lung mass  HLD (hyperlipidemia)  HTN (hypertension)  COPD (chronic obstructive pulmonary disease)  Carotid stent occlusion, initial encounter  H/O hernia repair  Ectopic thyroid tissue      SOCIAL HX: Lives alone, has a sister and a brother, the latter has developmental delay and is very sick (she is his guardian, seems he lives in a home)    Hx opiate tolerance (x )YES  ( )NO    Baseline ADLs  (Prior to Admission)  ( x) Independent   ( )Dependent    FAMILY HISTORY:   Sibling with lung cancer    Review of Systems:    Anxiety- yes  Depression- no  Constipation- no, bm this am, no issues  Diarrhea- no  Nausea- no  Vomiting- no  Anorexia- no  Weight Loss- yes   Cough- yes  Secretions- no  Fatigue- at times  Weakness- at times, but walking and accomplishing all ADLs  Delirium- no    All other systems reviewed and negative      PHYSICAL EXAM:    Vital Signs Last 24 Hrs  T(C): 36.7 (27 Sep 2019 10:09), Max: 36.7 (27 Sep 2019 10:09)  T(F): 98.1 (27 Sep 2019 10:09), Max: 98.1 (27 Sep 2019 10:09)  HR: 80 (27 Sep 2019 10:09) (80 - 108)  BP: 133/69 (27 Sep 2019 10:09) (99/66 - 133/69)  RR: 16 (27 Sep 2019 10:09) (16 - 16)  SpO2: 95% (27 Sep 2019 10:09) (94% - 99%)    PPSV2: 30  %    General: Elderly female sitting up in bed, anxious, but pleasant, thin  Mental Status: AOx4  HEENT: mmm, perrl, nc in place, +temporal and clavicular wasting  Lungs: dec at bases bl  Cardiac: +s1 s2 tachy  GI: soft nt nd +bs  : voids  Ext: moves all extremities, muscle and fat wasting throughout  Neuro: no focal deficits, though some ttp along arm       LABS:                        14.7   11.96 )-----------( 269      ( 25 Sep 2019 13:17 )             44.5     09-25    139  |  105  |  15  ----------------------------<  105<H>  4.5   |  25  |  0.86    Ca    9.2      25 Sep 2019 13:17    TPro  7.1  /  Alb  3.3  /  TBili  0.3  /  DBili  x   /  AST  22  /  ALT  25  /  AlkPhos  82  09-25    PT/INR - ( 25 Sep 2019 13:17 )   PT: 14.6 sec;   INR: 1.30 ratio         PTT - ( 25 Sep 2019 13:17 )  PTT:37.5 sec  Albumin: Albumin, Serum: 3.3 g/dL (09-25 @ 13:17)      Allergies    No Known Allergies    Intolerances      MEDICATIONS  (STANDING):  ALBUTerol/ipratropium for Nebulization 3 milliLiter(s) Nebulizer every 6 hours  apixaban 5 milliGRAM(s) Oral every 12 hours  aspirin enteric coated 81 milliGRAM(s) Oral daily  atorvastatin 40 milliGRAM(s) Oral at bedtime  buDESOnide 160 MICROgram(s)/formoterol 4.5 MICROgram(s) Inhaler 2 Puff(s) Inhalation two times a day  dextrose 5%. 1000 milliLiter(s) (50 mL/Hr) IV Continuous <Continuous>  dextrose 50% Injectable 12.5 Gram(s) IV Push once  dextrose 50% Injectable 25 Gram(s) IV Push once  dextrose 50% Injectable 25 Gram(s) IV Push once  diltiazem    milliGRAM(s) Oral daily  diltiazem Infusion 5 mG/Hr (5 mL/Hr) IV Continuous <Continuous>  fentaNYL   Patch  25 MICROgram(s)/Hr 1 Patch Transdermal every 72 hours  gabapentin 300 milliGRAM(s) Oral three times a day  insulin lispro (HumaLOG) corrective regimen sliding scale   SubCutaneous three times a day before meals  losartan 50 milliGRAM(s) Oral daily  methylPREDNISolone sodium succinate Injectable 60 milliGRAM(s) IV Push three times a day  senna 2 Tablet(s) Oral at bedtime    MEDICATIONS  (PRN):  ALPRAZolam 0.25 milliGRAM(s) Oral two times a day PRN Anxiety  dextrose 40% Gel 15 Gram(s) Oral once PRN Blood Glucose LESS THAN 70 milliGRAM(s)/deciliter  glucagon  Injectable 1 milliGRAM(s) IntraMuscular once PRN Glucose LESS THAN 70 milligrams/deciliter  morphine  - Injectable 4 milliGRAM(s) IV Push every 4 hours PRN Severe Pain (7 - 10)  morphine  - Injectable 2 milliGRAM(s) IV Push every 4 hours PRN Moderate Pain (4 - 6)  morphine Concentrate 5 milliGRAM(s) Oral every 4 hours PRN mod to severe pain      RADIOLOGY/ADDITIONAL STUDIES:    EXAM:  CT CHEST                        PROCEDURE DATE:  09/13/2019      IMPRESSION:     Left apical nodular opacity with a conglomerate left hilar mass   compatible with malignancy till proven otherwise. Extrinsic narrowing of   the left upper lobe bronchus with additional patchy nodularity and   interlobular septal thickening in the left upper lobe may represent   lymphangitic spread disease and/or pulmonary venous   congestion/postobstructive pneumonia. This mass extends into the   pericardial and mediastinal fat.    Concern for left supraclavicular adenopathy.    MARISOL QUEZADA M.D., ATTENDING RADIOLOGIST  This document has been electronically signed. Sep 13 2019  4:26PM      EXAM:  XR CHEST PORTABLE IMMED 1V                        PROCEDURE DATE:  09/25/2019      IMPRESSION: Unchanged left upper lobe and hilar findings.    RODERICK TOTH   This document has been electronically signed. Sep 25 2019  2:27PM

## 2019-09-27 NOTE — PROGRESS NOTE ADULT - SUBJECTIVE AND OBJECTIVE BOX
CC/reason for follow up: SOB  Interval Events:     ROS:    CV -   Respiratory       T(C): 36.7 (09-27-19 @ 10:09), Max: 36.7 (09-27-19 @ 10:09)  HR: 80 (09-27-19 @ 10:09) (80 - 108)  BP: 133/69 (09-27-19 @ 10:09) (99/66 - 133/69)  RR: 16 (09-27-19 @ 10:09) (16 - 16)  SpO2: 95% (09-27-19 @ 10:09) (94% - 99%)    Gen - Pleasant, cooperative, in no acute distress  HEENT- PERRL, moist mucus membranes, OP clear  CV - RRR, No m/r/g, +pulses, * edema  Lungs - Good effort, Clear to auscultation bilaterally  Abdomen - Soft, nontender, nondistended, +BS, No rebound/rigidity/guarding  Ext - No cyanosis/clubbing.   Skin - No rashes, No jaundice  Psych- Alert & oriented x 3  Neuro- *      MEDICATIONS  (STANDING):  ALBUTerol/ipratropium for Nebulization 3 milliLiter(s) Nebulizer every 6 hours  apixaban 5 milliGRAM(s) Oral every 12 hours  aspirin enteric coated 81 milliGRAM(s) Oral daily  atorvastatin 40 milliGRAM(s) Oral at bedtime  buDESOnide 160 MICROgram(s)/formoterol 4.5 MICROgram(s) Inhaler 2 Puff(s) Inhalation two times a day  dextrose 5%. 1000 milliLiter(s) (50 mL/Hr) IV Continuous <Continuous>  dextrose 50% Injectable 12.5 Gram(s) IV Push once  dextrose 50% Injectable 25 Gram(s) IV Push once  dextrose 50% Injectable 25 Gram(s) IV Push once  diltiazem    milliGRAM(s) Oral daily  diltiazem Infusion 5 mG/Hr (5 mL/Hr) IV Continuous <Continuous>  fentaNYL   Patch  25 MICROgram(s)/Hr 1 Patch Transdermal every 72 hours  gabapentin 300 milliGRAM(s) Oral three times a day  insulin lispro (HumaLOG) corrective regimen sliding scale   SubCutaneous three times a day before meals  losartan 50 milliGRAM(s) Oral daily  methylPREDNISolone sodium succinate Injectable 60 milliGRAM(s) IV Push three times a day  senna 2 Tablet(s) Oral at bedtime    MEDICATIONS  (PRN):  ALPRAZolam 0.25 milliGRAM(s) Oral two times a day PRN Anxiety  dextrose 40% Gel 15 Gram(s) Oral once PRN Blood Glucose LESS THAN 70 milliGRAM(s)/deciliter  glucagon  Injectable 1 milliGRAM(s) IntraMuscular once PRN Glucose LESS THAN 70 milligrams/deciliter  morphine  - Injectable 4 milliGRAM(s) IV Push every 4 hours PRN Severe Pain (7 - 10)  morphine  - Injectable 2 milliGRAM(s) IV Push every 4 hours PRN Moderate Pain (4 - 6)  morphine Concentrate 5 milliGRAM(s) Oral every 4 hours PRN mod to severe pain    Diagnostic studies: personally reviewed  < from: Xray Chest 1 View-PORTABLE IMMEDIATE (09.25.19 @ 14:19) >  IMPRESSION: Unchanged left upper lobe and hilar findings.    < end of copied text >    LABS: All Labs Reviewed: CC/reason for follow up: SOB  Interval Events: No acute events overnight. Patient seen and examined at beside.    ROS:    CV - Denies palpitations or CP  Respiratory - C/o of DUBOIS/rest.    T(C): 36.7 (09-27-19 @ 10:09), Max: 36.7 (09-27-19 @ 10:09)  HR: 80 (09-27-19 @ 10:09) (80 - 108)  BP: 133/69 (09-27-19 @ 10:09) (99/66 - 133/69)  RR: 16 (09-27-19 @ 10:09) (16 - 16)  SpO2: 95% (09-27-19 @ 10:09) (94% - 99%)    Gen - Pleasant, cooperative, in no acute distress  HEENT- PERRL, moist mucus membranes  CV - RRR, No m/r/g, trace BLE edema  Lungs - Good effort, Clear to auscultation bilaterally, diminished BL  Abdomen - Soft, nontender, nondistended, +BS, No rebound/rigidity/guarding  Ext - No cyanosis/clubbing.   Skin - No rashes, No jaundice  Psych- Alert & oriented x 3  Neuro- fluent speech, no facial droop, EOMI. moves all extremities     MEDICATIONS  (STANDING):  ALBUTerol/ipratropium for Nebulization 3 milliLiter(s) Nebulizer every 6 hours  apixaban 5 milliGRAM(s) Oral every 12 hours  aspirin enteric coated 81 milliGRAM(s) Oral daily  atorvastatin 40 milliGRAM(s) Oral at bedtime  buDESOnide 160 MICROgram(s)/formoterol 4.5 MICROgram(s) Inhaler 2 Puff(s) Inhalation two times a day  dextrose 5%. 1000 milliLiter(s) (50 mL/Hr) IV Continuous <Continuous>  dextrose 50% Injectable 12.5 Gram(s) IV Push once  dextrose 50% Injectable 25 Gram(s) IV Push once  dextrose 50% Injectable 25 Gram(s) IV Push once  diltiazem    milliGRAM(s) Oral daily  diltiazem Infusion 5 mG/Hr (5 mL/Hr) IV Continuous <Continuous>  fentaNYL   Patch  25 MICROgram(s)/Hr 1 Patch Transdermal every 72 hours  gabapentin 300 milliGRAM(s) Oral three times a day  insulin lispro (HumaLOG) corrective regimen sliding scale   SubCutaneous three times a day before meals  losartan 50 milliGRAM(s) Oral daily  methylPREDNISolone sodium succinate Injectable 60 milliGRAM(s) IV Push three times a day  senna 2 Tablet(s) Oral at bedtime    MEDICATIONS  (PRN):  ALPRAZolam 0.25 milliGRAM(s) Oral two times a day PRN Anxiety  dextrose 40% Gel 15 Gram(s) Oral once PRN Blood Glucose LESS THAN 70 milliGRAM(s)/deciliter  glucagon  Injectable 1 milliGRAM(s) IntraMuscular once PRN Glucose LESS THAN 70 milligrams/deciliter  morphine  - Injectable 4 milliGRAM(s) IV Push every 4 hours PRN Severe Pain (7 - 10)  morphine  - Injectable 2 milliGRAM(s) IV Push every 4 hours PRN Moderate Pain (4 - 6)  morphine Concentrate 5 milliGRAM(s) Oral every 4 hours PRN mod to severe pain    Diagnostic studies: personally reviewed  < from: Xray Chest 1 View-PORTABLE IMMEDIATE (09.25.19 @ 14:19) >  IMPRESSION: Unchanged left upper lobe and hilar findings.    < end of copied text >    LABS: All Labs Reviewed: CC/reason for follow up: SOB  Interval Events: No acute events overnight. Patient seen and examined at beside.    ROS:    CV - Denies palpitations or CP  Respiratory - C/o of DUBOIS/rest.    Vital Signs Last 24 Hrs  T(C): 36.7 (27 Sep 2019 10:09), Max: 36.7 (27 Sep 2019 10:09)  T(F): 98.1 (27 Sep 2019 10:09), Max: 98.1 (27 Sep 2019 10:09)  HR: 88 (27 Sep 2019 13:00) (80 - 108)  BP: 133/69 (27 Sep 2019 10:09) (99/66 - 133/69)  BP(mean): --  RR: 16 (27 Sep 2019 10:09) (16 - 16)  SpO2: 95% (27 Sep 2019 10:09) (94% - 99%)    physical  Gen - Pleasant, cooperative, in no acute distress  Neuro: AAOx3  HEENT- PERRL, moist mucus membranes  CV - RRR, no m/g/r  Lungs - Good effort, Clear to auscultation bilaterally, diminished BL  Abdomen - Soft, nontender, nondistended, +BS, No rebound/rigidity/guarding  Ext -  trace b/l edema  Skin - No rashes, No jaundice  MSK: ;5/5 strength all extremities             LABS: All Labs Reviewed: none today    < from: Xray Chest 1 View-PORTABLE IMMEDIATE (09.25.19 @ 14:19) >  IMPRESSION: Unchanged left upper lobe and hilar findings.    < end of copied text >        MEDICATIONS  (STANDING):  ALBUTerol/ipratropium for Nebulization 3 milliLiter(s) Nebulizer every 6 hours  apixaban 5 milliGRAM(s) Oral every 12 hours  aspirin enteric coated 81 milliGRAM(s) Oral daily  atorvastatin 40 milliGRAM(s) Oral at bedtime  buDESOnide 160 MICROgram(s)/formoterol 4.5 MICROgram(s) Inhaler 2 Puff(s) Inhalation two times a day  dextrose 5%. 1000 milliLiter(s) (50 mL/Hr) IV Continuous <Continuous>  dextrose 50% Injectable 12.5 Gram(s) IV Push once  dextrose 50% Injectable 25 Gram(s) IV Push once  dextrose 50% Injectable 25 Gram(s) IV Push once  diltiazem    milliGRAM(s) Oral daily  diltiazem Infusion 5 mG/Hr (5 mL/Hr) IV Continuous <Continuous>  fentaNYL   Patch  25 MICROgram(s)/Hr 1 Patch Transdermal every 72 hours  gabapentin 300 milliGRAM(s) Oral three times a day  insulin lispro (HumaLOG) corrective regimen sliding scale   SubCutaneous three times a day before meals  losartan 50 milliGRAM(s) Oral daily  methylPREDNISolone sodium succinate Injectable 60 milliGRAM(s) IV Push three times a day  senna 2 Tablet(s) Oral at bedtime    MEDICATIONS  (PRN):  ALPRAZolam 0.25 milliGRAM(s) Oral two times a day PRN Anxiety  dextrose 40% Gel 15 Gram(s) Oral once PRN Blood Glucose LESS THAN 70 milliGRAM(s)/deciliter  glucagon  Injectable 1 milliGRAM(s) IntraMuscular once PRN Glucose LESS THAN 70 milligrams/deciliter  morphine  - Injectable 4 milliGRAM(s) IV Push every 4 hours PRN Severe Pain (7 - 10)  morphine  - Injectable 2 milliGRAM(s) IV Push every 4 hours PRN Moderate Pain (4 - 6)  morphine Concentrate 5 milliGRAM(s) Oral every 4 hours PRN mod to severe pain

## 2019-09-27 NOTE — CONSULT NOTE ADULT - ASSESSMENT
85 year old woman with the above history including a recent diagnosis of metastatic lung cancer who is admitted with shortness of breath.  She was initially in atrial fibrillation but now predominantly in sinus rhythm with paroxysmal atrial fibrillation.  Her shortness of breath is probably multifactorial.  No evidence for ACS.  Would continue her current medical regimen.  Will discuss palliative chemotherapy with the patient.

## 2019-09-27 NOTE — PROGRESS NOTE ADULT - ASSESSMENT
Assessment and Plan:    84 y/o female w/ PMHx of COPD, Left Lung mass (biopsied on 9/16 found to be small cell carcinoma, Afib, RA previously on immunosuppressant tx, HLD, HTN, presented to the ED c/o  worsening of shortness of breath today, did not get better with her usual meds of bronchodilator, associated with palpitation.    1. Acute on chronic hypoxic respiratory failure; likely multifactorial *Afib w/ RVR, COPD exacerbation, Small Cell Lung CA  - Cont. Symbicort/duonebs  - Cont. Solumedrol 60mg TID   - Cardizem gtt. dc'ed - on PO Cardizem.  On  Eliquis.    - Pt. previous seen [facility] heme/onc - patient endorses receiving radiation for this in the past. refusing any further treatments at this time.  - I contacted MD Raya (radiation/onc) patient has not completed any radiation treatments at the office  - Heme/Onc f/u appreciated  - Consider palliative cx and hospice eval.   - Monitor 02 sats.     2. Acute Pain  *Likely 2/2 Lung CA  - Cont Morphine IV PRN mod/severe pain  - Fentanyl 25mcg patch   - Senna HS  - Istop #[id number] - copy in chart    3. HTN  - Cont. losartan     4. HLD  - Cont. Statin     5. Anxiety  - Cont. xanax PRN BID    6. DVT PPX  - Eliquis     Code status: DNR/DNI- MOLST  Dispo: Tele  ELOS: 1-2 days    All questions/concerns were discussed with patient/family by bedside.  Case d/w RN, NP, CM/SW  Total time spent: 45min. 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    1. Acute on chronic hypoxic respiratory failure; likely multifactorial *Afib w/ RVR, COPD exacerbation, Small Cell Lung CA  - Cont. Symbicort/duonebs  - Cont. Solumedrol 60mg TID   - Cardizem gtt. dc'ed - on PO Cardizem.  On  Eliquis.    - Pt. previous seen [facility] heme/onc - patient endorses receiving radiation for this in the past. refusing any further treatments at this time.  - I contacted MD Raya (radiation/onc) patient has not completed any radiation treatments at the office  - Heme/Onc f/u appreciated  - Consider palliative cx and hospice eval. - pall meeting tentatively scheduled for Monday at 10am  - Monitor 02 sats.     2. Acute Pain  *Likely 2/2 Lung CA  - Cont Morphine IV PRN mod/severe pain  - Fentanyl 25mcg patch - monitor PRN used, consider dosage increase  - Senna HS  - Istop #[id number] - copy in chart    3. HTN  - Cont. losartan     4. HLD  - Cont. Statin     5. Anxiety  - Cont. xanax PRN BID    6. DVT PPX  - Eliquis     Code status: DNR/DNI- MOLST  Dispo: Tele  ELOS: 1-2 days    All questions/concerns were discussed with patient/family by bedside.  Case d/w RN, NP, CM/SW  Total time spent: 45min. 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    1. Acute on chronic hypoxic respiratory failure  - due to Copd exacerbation, small cell lung ca  - IV steroids, nebs   - monitor O2 sats - currently 98% on 2LNC     2. small cell lung ca  - seen by Heme/Onc, Dr. Campbell outpt-  patient endorses receiving radiation for this in the past. refusing any further treatments at this time.  - I contacted MD Raya (radiation/onc) patient has not completed any radiation treatments at the office  - Heme/Onc f/u appreciated  - Palliative care consult appreciated- GO meeting Monday 10am  - pain control- fentanyl, morphine    3. afib w/ rvr  - controlled on po cardizem  - eliquis     4. HTN  - Cont. losartan     5. HLD  - Cont. Statin     6. Anxiety  - Cont. xanax PRN BID    7. DVT PPX  - Eliquis     Code status: DNR/DNI- MOLST    9/27- copd exacerbation , iv steroids.   Recent diagnosis small cell lung cancer- pt uncertain of goals.  Palliative meeting Monday.

## 2019-09-27 NOTE — CONSULT NOTE ADULT - SUBJECTIVE AND OBJECTIVE BOX
CHIEF COMPLAINT: Patient is a 85y old  Female who presents with a chief complaint of shortness of breath (26 Sep 2019 08:54)      HPI: HPI:  84 y/o female developed worseness of shortness of breath today, did not get better with her usual meds of bronchodilator, associated with palpitation. She normally has shortness of breath. Aggravated by mild exertion, associated with cough, dry mostly, no fever.   + recent diagnosis of metastatic lung cancer-was seeing Dr Rashel Campbell; had biopsy by Dr Meraz;  has been refusing palliative chemotherapy  she is chronic active smoker and still was smoking- counselled not to smoke (25 Sep 2019 17:04)      PMHx: PAST MEDICAL & SURGICAL HISTORY:  Rheumatoid arthritis  Lung mass/metastatic lung cancer  HLD (hyperlipidemia)  HTN (hypertension)  COPD (chronic obstructive pulmonary disease)  Carotid stent occlusion, initial encounter  H/O hernia repair  Ectopic thyroid tissue  Paroxysmal atrial fibrillation on eliquis      Soc Hx:     FAMILY HISTORY:      Allergies: Allergies    No Known Allergies    Intolerances          REVIEW OF SYSTEMS:    As above  No chest pain + shortness of breath  No lightheadeness or syncope  No leg swelling  No palpitations  No claudication-like symptoms    Vital Signs Last 24 Hrs  T(C): 36.4 (27 Sep 2019 05:25), Max: 36.6 (26 Sep 2019 16:22)  T(F): 97.6 (27 Sep 2019 05:25), Max: 97.8 (26 Sep 2019 16:22)  HR: 92 (27 Sep 2019 05:25) (92 - 108)  BP: 123/59 (27 Sep 2019 05:25) (99/66 - 123/59)  BP(mean): --  RR: 16 (27 Sep 2019 05:25) (16 - 16)  SpO2: 96% (27 Sep 2019 05:25) (92% - 99%)    I&O's Summary      CAPILLARY BLOOD GLUCOSE      POCT Blood Glucose.: 175 mg/dL (26 Sep 2019 17:11)  POCT Blood Glucose.: 238 mg/dL (26 Sep 2019 12:07)      PHYSICAL EXAM:   Patient in NAD  Neck: No JVD; Carotids:  2+ without bruits  Respiratory:  Clear to A&P  Cardiovascular: S1 and S2, regular rate and rhythm, no Murmurs, gallops or rubs  Gastrointestinal:  Soft, non-tender; BS positive  Extremities: No peripheral edema  Vascular: 2+ peripheral pulses  Neurological: A/O x 3, no focal deficits      MEDICATIONS:  MEDICATIONS  (STANDING):  ALBUTerol/ipratropium for Nebulization 3 milliLiter(s) Nebulizer every 6 hours  apixaban 5 milliGRAM(s) Oral every 12 hours  aspirin enteric coated 81 milliGRAM(s) Oral daily  atorvastatin 40 milliGRAM(s) Oral at bedtime  buDESOnide 160 MICROgram(s)/formoterol 4.5 MICROgram(s) Inhaler 2 Puff(s) Inhalation two times a day  dextrose 5%. 1000 milliLiter(s) (50 mL/Hr) IV Continuous <Continuous>  dextrose 50% Injectable 12.5 Gram(s) IV Push once  dextrose 50% Injectable 25 Gram(s) IV Push once  dextrose 50% Injectable 25 Gram(s) IV Push once  diltiazem    milliGRAM(s) Oral daily  diltiazem Infusion 5 mG/Hr (5 mL/Hr) IV Continuous <Continuous>  fentaNYL   Patch  25 MICROgram(s)/Hr 1 Patch Transdermal every 72 hours  gabapentin 300 milliGRAM(s) Oral three times a day  insulin lispro (HumaLOG) corrective regimen sliding scale   SubCutaneous three times a day before meals  losartan 50 milliGRAM(s) Oral daily  methylPREDNISolone sodium succinate Injectable 60 milliGRAM(s) IV Push three times a day  senna 2 Tablet(s) Oral at bedtime    Home Medications:  albuterol 2.5 mg/3 mL (0.083%) inhalation solution: 3 milliliter(s) inhaled every 4 to 6 hours, As Needed - for shortness of breath and/or wheezing (25 Sep 2019 16:49)  ALPRAZolam 0.25 mg oral tablet: 1 tab(s) orally 2 times a day, As Needed - for anxiety, for insomnia (25 Sep 2019 16:49)  aspirin 81 mg oral delayed release tablet: 1 tab(s) orally once a day (25 Sep 2019 16:49)  B Complex: 1 tab(s) orally once a day (25 Sep 2019 16:49)  Benadryl 25 mg oral capsule: 2 cap(s) orally once a day (at bedtime) (25 Sep 2019 16:49)  dicyclomine 10 mg oral capsule: 1 cap(s) orally 3 times a day (before meals) (25 Sep 2019 16:49)  dilTIAZem 120 mg/24 hours oral capsule, extended release: 1 cap(s) orally once a day (25 Sep 2019 16:49)  Eliquis 5 mg oral tablet: 1 tab(s) orally 2 times a day (25 Sep 2019 16:49)  fentaNYL 25 mcg/hr transdermal film, extended release: 1 film(s) transdermal every 72 hours  **pt just picked up from drug store- has not applied one yet** (25 Sep 2019 16:49)  gabapentin 300 mg oral capsule: 1 cap(s) orally 3 times a day (25 Sep 2019 16:49)  irbesartan 150 mg oral tablet: 1 tab(s) orally once a day (25 Sep 2019 16:49)  Multiple Vitamins oral tablet: 1 tab(s) orally once a day (25 Sep 2019 16:49)  PreserVision AREDS 2 oral capsule: 1 cap(s) orally 2 times a day (25 Sep 2019 16:49)  rosuvastatin 40 mg oral tablet: 1 tab(s) orally once a day (25 Sep 2019 16:49)  Simponi Aria 50 mg/4 mL intravenous solution: Inject every 2 months  **Pt had 2 months ago but med is on HOLD now** (25 Sep 2019 16:49)  Symbicort 160 mcg-4.5 mcg/inh inhalation aerosol: 2 puff(s) inhaled 2 times a day (25 Sep 2019 16:49)  Ventolin HFA 90 mcg/inh inhalation aerosol: 2 puff(s) inhaled every 4 to 6 hours, As Needed - for shortness of breath and/or wheezing (25 Sep 2019 16:49)  Vitamin D3 1000 intl units oral tablet: 1 tab(s) orally once a day (25 Sep 2019 16:49)        LABS: All Labs Reviewed:  Blood Culture:   BNP   CBC             Wbc:  11.96 K/ul              Hemoglobin: 14.7 g/dL (09-25 @ 13:17)              Hematocrit: 44.5 % (09-25 @ 13:17)              Mean Cell Volume: 96.5 fl (09-25 @ 13:17)              Platelet Count - Automated: 269 K/uL (09-25 @ 13:17)                            Cardiac markers             Troponin I, Serum: <0.015 ng/mL (09-25 @ 13:17)                             Chems        Sodium, Serum: 139 mmol/L (09-25 @ 13:17)          Potassium, Serum: 4.5 mmol/L (09-25 @ 13:17)          Blood Urea Nitrogen, Serum: 15 mg/dL (09-25 @ 13:17)          Creatinine 0.86                  Protein Total, Serum: 7.1 gm/dL (09-25 @ 13:17)                  Calcium, Total Serum: 9.2 mg/dL (09-25 @ 13:17)                  Bilirubin Total, Serum: 0.3 mg/dL (09-25 @ 13:17)          Alanine Aminotransferase (ALT/SGPT): 25 U/L (09-25 @ 13:17)          Aspartate Aminotransferase (AST/SGOT): 22 U/L (09-25 @ 13:17)                 INR: 1.30 ratio (09-25 @ 13:17)             RADIOLOGY:    EKG:  Atrial fibrillation with a controlled VR; prwp; mild st-t changes    Telemetry:  Sinus with paroxysmal course atrial fibrillation(versus flutter)    ECHO:

## 2019-09-28 LAB
ANION GAP SERPL CALC-SCNC: 6 MMOL/L — SIGNIFICANT CHANGE UP (ref 5–17)
BUN SERPL-MCNC: 25 MG/DL — HIGH (ref 7–23)
CALCIUM SERPL-MCNC: 8.6 MG/DL — SIGNIFICANT CHANGE UP (ref 8.5–10.1)
CHLORIDE SERPL-SCNC: 109 MMOL/L — HIGH (ref 96–108)
CO2 SERPL-SCNC: 26 MMOL/L — SIGNIFICANT CHANGE UP (ref 22–31)
CREAT SERPL-MCNC: 0.71 MG/DL — SIGNIFICANT CHANGE UP (ref 0.5–1.3)
GLUCOSE SERPL-MCNC: 155 MG/DL — HIGH (ref 70–99)
HCT VFR BLD CALC: 36.5 % — SIGNIFICANT CHANGE UP (ref 34.5–45)
HGB BLD-MCNC: 11.9 G/DL — SIGNIFICANT CHANGE UP (ref 11.5–15.5)
MCHC RBC-ENTMCNC: 31.6 PG — SIGNIFICANT CHANGE UP (ref 27–34)
MCHC RBC-ENTMCNC: 32.6 GM/DL — SIGNIFICANT CHANGE UP (ref 32–36)
MCV RBC AUTO: 96.8 FL — SIGNIFICANT CHANGE UP (ref 80–100)
PLATELET # BLD AUTO: 237 K/UL — SIGNIFICANT CHANGE UP (ref 150–400)
POTASSIUM SERPL-MCNC: 4.3 MMOL/L — SIGNIFICANT CHANGE UP (ref 3.5–5.3)
POTASSIUM SERPL-SCNC: 4.3 MMOL/L — SIGNIFICANT CHANGE UP (ref 3.5–5.3)
RBC # BLD: 3.77 M/UL — LOW (ref 3.8–5.2)
RBC # FLD: 14.2 % — SIGNIFICANT CHANGE UP (ref 10.3–14.5)
SODIUM SERPL-SCNC: 141 MMOL/L — SIGNIFICANT CHANGE UP (ref 135–145)
WBC # BLD: 16.82 K/UL — HIGH (ref 3.8–10.5)
WBC # FLD AUTO: 16.82 K/UL — HIGH (ref 3.8–10.5)

## 2019-09-28 RX ORDER — FENTANYL CITRATE 50 UG/ML
1 INJECTION INTRAVENOUS
Refills: 0 | Status: DISCONTINUED | OUTPATIENT
Start: 2019-09-28 | End: 2019-09-30

## 2019-09-28 RX ADMIN — APIXABAN 5 MILLIGRAM(S): 2.5 TABLET, FILM COATED ORAL at 05:48

## 2019-09-28 RX ADMIN — Medication 1: at 17:47

## 2019-09-28 RX ADMIN — SENNA PLUS 2 TABLET(S): 8.6 TABLET ORAL at 21:11

## 2019-09-28 RX ADMIN — MORPHINE SULFATE 5 MILLIGRAM(S): 50 CAPSULE, EXTENDED RELEASE ORAL at 06:18

## 2019-09-28 RX ADMIN — Medication 600 MILLIGRAM(S): at 17:47

## 2019-09-28 RX ADMIN — Medication 3 MILLILITER(S): at 08:25

## 2019-09-28 RX ADMIN — Medication 81 MILLIGRAM(S): at 12:06

## 2019-09-28 RX ADMIN — MORPHINE SULFATE 5 MILLIGRAM(S): 50 CAPSULE, EXTENDED RELEASE ORAL at 14:02

## 2019-09-28 RX ADMIN — Medication 2: at 12:06

## 2019-09-28 RX ADMIN — Medication 60 MILLIGRAM(S): at 13:59

## 2019-09-28 RX ADMIN — LOSARTAN POTASSIUM 50 MILLIGRAM(S): 100 TABLET, FILM COATED ORAL at 05:48

## 2019-09-28 RX ADMIN — MORPHINE SULFATE 5 MILLIGRAM(S): 50 CAPSULE, EXTENDED RELEASE ORAL at 10:35

## 2019-09-28 RX ADMIN — Medication 0.25 MILLIGRAM(S): at 21:10

## 2019-09-28 RX ADMIN — Medication 60 MILLIGRAM(S): at 21:11

## 2019-09-28 RX ADMIN — MORPHINE SULFATE 5 MILLIGRAM(S): 50 CAPSULE, EXTENDED RELEASE ORAL at 10:21

## 2019-09-28 RX ADMIN — Medication 3 MILLILITER(S): at 02:37

## 2019-09-28 RX ADMIN — MORPHINE SULFATE 5 MILLIGRAM(S): 50 CAPSULE, EXTENDED RELEASE ORAL at 21:11

## 2019-09-28 RX ADMIN — Medication 600 MILLIGRAM(S): at 05:48

## 2019-09-28 RX ADMIN — FENTANYL CITRATE 1 PATCH: 50 INJECTION INTRAVENOUS at 08:47

## 2019-09-28 RX ADMIN — GABAPENTIN 300 MILLIGRAM(S): 400 CAPSULE ORAL at 05:48

## 2019-09-28 RX ADMIN — GABAPENTIN 300 MILLIGRAM(S): 400 CAPSULE ORAL at 21:11

## 2019-09-28 RX ADMIN — APIXABAN 5 MILLIGRAM(S): 2.5 TABLET, FILM COATED ORAL at 17:47

## 2019-09-28 RX ADMIN — MORPHINE SULFATE 5 MILLIGRAM(S): 50 CAPSULE, EXTENDED RELEASE ORAL at 14:16

## 2019-09-28 RX ADMIN — ATORVASTATIN CALCIUM 40 MILLIGRAM(S): 80 TABLET, FILM COATED ORAL at 21:11

## 2019-09-28 RX ADMIN — FENTANYL CITRATE 1 PATCH: 50 INJECTION INTRAVENOUS at 06:43

## 2019-09-28 RX ADMIN — Medication 60 MILLIGRAM(S): at 05:47

## 2019-09-28 RX ADMIN — BUDESONIDE AND FORMOTEROL FUMARATE DIHYDRATE 2 PUFF(S): 160; 4.5 AEROSOL RESPIRATORY (INHALATION) at 08:25

## 2019-09-28 RX ADMIN — Medication 120 MILLIGRAM(S): at 05:48

## 2019-09-28 RX ADMIN — FENTANYL CITRATE 1 PATCH: 50 INJECTION INTRAVENOUS at 19:40

## 2019-09-28 RX ADMIN — GABAPENTIN 300 MILLIGRAM(S): 400 CAPSULE ORAL at 13:59

## 2019-09-28 NOTE — PROGRESS NOTE ADULT - SUBJECTIVE AND OBJECTIVE BOX
HOSPITALIST ATTENDING PROGRESS NOTE    Chart and meds reviewed.  Patient seen and examined.    HPI: No acute events, discussed case with .   2nd opinion for hemeonc called by PMD.   Patient agrees to therapy.     All 10 systems reviewed and found to be negative with the exception of what has been described above.    MEDICATIONS  (STANDING):  ALBUTerol/ipratropium for Nebulization 3 milliLiter(s) Nebulizer every 6 hours  apixaban 5 milliGRAM(s) Oral every 12 hours  aspirin enteric coated 81 milliGRAM(s) Oral daily  atorvastatin 40 milliGRAM(s) Oral at bedtime  buDESOnide 160 MICROgram(s)/formoterol 4.5 MICROgram(s) Inhaler 2 Puff(s) Inhalation two times a day  dextrose 5%. 1000 milliLiter(s) (50 mL/Hr) IV Continuous <Continuous>  dextrose 50% Injectable 12.5 Gram(s) IV Push once  dextrose 50% Injectable 25 Gram(s) IV Push once  dextrose 50% Injectable 25 Gram(s) IV Push once  diltiazem    milliGRAM(s) Oral daily  diltiazem Infusion 5 mG/Hr (5 mL/Hr) IV Continuous <Continuous>  fentaNYL   Patch  25 MICROgram(s)/Hr 1 Patch Transdermal every 72 hours  gabapentin 300 milliGRAM(s) Oral three times a day  guaiFENesin  milliGRAM(s) Oral every 12 hours  insulin lispro (HumaLOG) corrective regimen sliding scale   SubCutaneous three times a day before meals  losartan 50 milliGRAM(s) Oral daily  methylPREDNISolone sodium succinate Injectable 60 milliGRAM(s) IV Push three times a day  senna 2 Tablet(s) Oral at bedtime    MEDICATIONS  (PRN):  ALPRAZolam 0.25 milliGRAM(s) Oral two times a day PRN Anxiety  dextrose 40% Gel 15 Gram(s) Oral once PRN Blood Glucose LESS THAN 70 milliGRAM(s)/deciliter  glucagon  Injectable 1 milliGRAM(s) IntraMuscular once PRN Glucose LESS THAN 70 milligrams/deciliter  morphine  - Injectable 4 milliGRAM(s) IV Push every 4 hours PRN Severe Pain (7 - 10)  morphine  - Injectable 2 milliGRAM(s) IV Push every 4 hours PRN Moderate Pain (4 - 6)  morphine Concentrate 5 milliGRAM(s) Oral every 4 hours PRN Moderate Pain (4 - 6) to Severe Pain (7 - 10)      VITALS:  T(F): 97.7 (09-28-19 @ 13:07), Max: 97.8 (09-27-19 @ 17:47)  HR: 101 (09-28-19 @ 13:07) (82 - 108)  BP: 107/65 (09-28-19 @ 13:07) (105/68 - 128/77)  RR: 18 (09-28-19 @ 13:07) (17 - 18)  SpO2: 93% (09-28-19 @ 13:07) (93% - 97%)  Wt(kg): --    I&O's Summary      CAPILLARY BLOOD GLUCOSE      POCT Blood Glucose.: 236 mg/dL (28 Sep 2019 11:53)  POCT Blood Glucose.: 145 mg/dL (28 Sep 2019 08:22)  POCT Blood Glucose.: 161 mg/dL (28 Sep 2019 00:43)  POCT Blood Glucose.: 263 mg/dL (27 Sep 2019 17:24)      PHYSICAL EXAM:    HEENT:  pupils equal and reactive, EOMI, no oropharyngeal lesions, erythema, exudates, oral thrush  NECK:   supple, no carotid bruits, no palpable lymph nodes, no thyromegaly  CV:  +S1, +S2, regular, no murmurs or rubs  RESP:   lungs clear to auscultation bilaterally, no wheezing, rales, rhonchi, good air entry bilaterally  BREAST:  not examined  GI:  abdomen soft, non-tender, non-distended, normal BS, no bruits, no abdominal masses, no palpable masses  RECTAL:  not examined  :  not examined  MSK:   normal muscle tone, no atrophy, no rigidity, no contractions  EXT:  no clubbing, no cyanosis, no edema, no calf pain, swelling or erythema  VASCULAR:  pulses equal and symmetric in the upper and lower extremities  NEURO:  AAOX3, no focal neurological deficits, follows all commands, able to move extremities spontaneously  SKIN:  no ulcers, lesions or rashes    LABS:                            11.9   16.82 )-----------( 237      ( 28 Sep 2019 06:33 )             36.5     09-28    141  |  109<H>  |  25<H>  ----------------------------<  155<H>  4.3   |  26  |  0.71    Ca    8.6      28 Sep 2019 06:33                                              CULTURES:

## 2019-09-28 NOTE — CONSULT NOTE ADULT - ASSESSMENT
86 yo female with small cell cancer/ lung mass, bronchial compression; has dyspnea, cough,  chest pain. Has a reasonable performance status, ECOG 2; has normal liver enzymes/ renal function,  Albumin and mild anemia; therefore  palliative therapy for symptom management reasonable    I discussed option with patient/ one would be to attempt a round of up front chemotherapy   and see if her symptoms are alleviated/ if the latter occurs and treatment well tolerated, can go to the next   step and add either Immune therapy or RT depending on extent of disease FOY      Patient seems keen on proceeding with a trial of therapy/ can start CARBO ETOPOSIDE x 1  tomorrow/ if being DC home can continue Rx as out patient 84 yo female with small cell cancer/ lung mass, bronchial compression; has dyspnea, cough,  chest pain. Has a reasonable performance status, ECOG 2; has normal liver enzymes/ renal function,  normal albumin  and mild anemia    She has a cancer ( small cell) that , at least initially, has a high response rate to chemotherapy; therefore  palliative therapy for symptom management  would be reasonable; if disease is confined to the lung, a more aggressive approach of combined Radiation/ Chemotherapy can be potentially curative    Recent trials have  included immune therapy in advanced cancer with promising results    I discussed different  options  with the patient/ one would be to attempt a round of up front chemotherapy   and see if her symptoms are alleviated / if the latter occurs and the  treatment well tolerated, she can go to the next   step and add either Immune therapy or RT depending on extent of disease FOY; I f indeed the initial therapy does not   alleviate symptoms or has too many side effects, a palliative approach can be pursued       Patient seems keen on proceeding with a trial of therapy/ can start CARBO ETOPOSIDE x 1  tomorrow; all risks and benefits discussed    If she tolerates the therapy well, and is medically stable, can be DC home and followed as an out patient  DC home can continue Rx as out patient  for growth factor support etc    Will also recommend completion of extent of disease FOY: MRI brain/ CT PET, particularly if she is keen on continuing therapy

## 2019-09-28 NOTE — CONSULT NOTE ADULT - SUBJECTIVE AND OBJECTIVE BOX
HPI:  84 y/o female + smoking history / COPD on 02 developed worseness of shortness of breath  , left chest pain, radiation down left arm. CT JOSHUA chest mass/ hilar mass, left supraclavicular node: biopsy small cell cancer  Advised Radiation / chemotherapy and has been reluctant to undergo therapy at this time        PAST MEDICAL & SURGICAL HISTORY:  Rheumatoid arthritis  Lung mass  HLD (hyperlipidemia)  HTN (hypertension)  COPD (chronic obstructive pulmonary disease)  Carotid stent occlusion, initial encounter  H/O hernia repair  Ectopic thyroid tissue      MEDICATIONS  (STANDING):  ALBUTerol/ipratropium for Nebulization 3 milliLiter(s) Nebulizer every 6 hours  apixaban 5 milliGRAM(s) Oral every 12 hours  aspirin enteric coated 81 milliGRAM(s) Oral daily  atorvastatin 40 milliGRAM(s) Oral at bedtime  buDESOnide 160 MICROgram(s)/formoterol 4.5 MICROgram(s) Inhaler 2 Puff(s) Inhalation two times a day  dextrose 5%. 1000 milliLiter(s) (50 mL/Hr) IV Continuous <Continuous>  dextrose 50% Injectable 12.5 Gram(s) IV Push once  dextrose 50% Injectable 25 Gram(s) IV Push once  dextrose 50% Injectable 25 Gram(s) IV Push once  diltiazem    milliGRAM(s) Oral daily  diltiazem Infusion 5 mG/Hr (5 mL/Hr) IV Continuous <Continuous>  fentaNYL   Patch  25 MICROgram(s)/Hr 1 Patch Transdermal every 72 hours  gabapentin 300 milliGRAM(s) Oral three times a day  guaiFENesin  milliGRAM(s) Oral every 12 hours  insulin lispro (HumaLOG) corrective regimen sliding scale   SubCutaneous three times a day before meals  losartan 50 milliGRAM(s) Oral daily  methylPREDNISolone sodium succinate Injectable 60 milliGRAM(s) IV Push three times a day  senna 2 Tablet(s) Oral at bedtime    MEDICATIONS  (PRN):  ALPRAZolam 0.25 milliGRAM(s) Oral two times a day PRN Anxiety  dextrose 40% Gel 15 Gram(s) Oral once PRN Blood Glucose LESS THAN 70 milliGRAM(s)/deciliter  glucagon  Injectable 1 milliGRAM(s) IntraMuscular once PRN Glucose LESS THAN 70 milligrams/deciliter  morphine  - Injectable 4 milliGRAM(s) IV Push every 4 hours PRN Severe Pain (7 - 10)  morphine  - Injectable 2 milliGRAM(s) IV Push every 4 hours PRN Moderate Pain (4 - 6)  morphine Concentrate 5 milliGRAM(s) Oral every 4 hours PRN Moderate Pain (4 - 6) to Severe Pain (7 - 10)      Allergies    No Known Allergies    Intolerances        SOCIAL HISTORY:    FAMILY HISTORY:      Vital Signs Last 24 Hrs  T(C): 36.5 (28 Sep 2019 05:00), Max: 36.6 (27 Sep 2019 17:47)  T(F): 97.7 (28 Sep 2019 05:00), Max: 97.8 (27 Sep 2019 17:47)  HR: 97 (28 Sep 2019 05:00) (82 - 108)  BP: 105/68 (28 Sep 2019 05:00) (105/68 - 128/77)  BP(mean): --  RR: 17 (28 Sep 2019 05:00) (17 - 18)  SpO2: 97% (28 Sep 2019 05:00) (93% - 97%)    PHYSICAL EXAM:      Constitutional: elderly, spry / Appears comfortable, in  NAD, A/O X 3   On O2  Eyes: EOMI, PERRLA ;No icterus    ENMT:  No oral lesions, thrush; pharynx not injected    Neck:  Supple; No masses, lymph nodes, no bruits    Breasts: NA    Back: No tenderness     Respiratory:  Clear to A/P, No wheezes, rhonchi, rales. No dullness to percussion    Cardiovascular: Normal rate and rhythm; normal S1 and S2; No murmurs. No gallop    Gastrointestinal: No distension, normal bowl sounds; No tendeness , guarding, rebound;  no masses, no hepatosplenimegaly, no fluid wave    Genitourinary: No flank pains, no inguninal adenopathy    Rectal: NA    Extremities:  No phlebitis, no edema    Vascular: No acrocyanosis, no edema    Neurological: Alert and oriented. Cranial nerves II-XII WNL, Upper extremity / lower extremity  strength WNL;  Reflexes WNL, Plantar downgoing ; No cerebellar signs    Skin: No rash, petechiae purpura, ecchymoses    Lymph Nodes: No cervical, supraclavicular, axillary, inguinal adenopathy    Musculoskeletal: No joint swelling    Psychiatric: Alert, oriented, normal affect          LABS:                        11.9   16.82 )-----------( 237      ( 28 Sep 2019 06:33 )             36.5     09-28    141  |  109<H>  |  25<H>  ----------------------------<  155<H>  4.3   |  26  |  0.71    Ca    8.6      28 Sep 2019 06:33            RADIOLOGY & ADDITIONAL STUDIES:    < from: CT Chest No Cont (09.13.19 @ 16:04) >  EXAM:  CT CHEST                            PROCEDURE DATE:  09/13/2019          INTERPRETATION:  CLINICAL INFORMATION: Left upper lobe mass    COMPARISON: None.    PROCEDURE:   CT of the Chest was performed without intravenous contrast.  Sagittal and coronal reformats were performed.      FINDINGS:    LUNGS, AIRWAYS, MEDIASTINUM AND MIGDALIA: Large left suprahilar mass   measuring at least 6.4 x 4.5 cm which demonstrates marked extrinsic   compression on the left upper lobe bronchus and extends into the   mediastinal fat and left upper lobe parenchyma compatible with   malignancy. Left apical nodular mass measuring 1.6 cm. Additional patchy   opacities in the left upper lobe with interlobular septal thickening may   represent a combination of metastatic spread of disease pulmonary venous   congestion and/or postobstructive pneumonia. Additional mediastinal and   hilar adenopathy. Severe emphysema. No pneumothorax. Right medial basilar   subsegmental atelectasis. Right apical pleural parenchymal scarring.    PLEURA: Small left pleural effusion.    VESSELS: Atherosclerotic changes of the aorta and coronary vasculature.    HEART: Heart size is normal. No pericardial effusion.    CHEST WALL AND LOWER NECK: Concern for left supraclavicular adenopathy.   The left apical nodular density extends into the medial left apical   pleural fat.    VISUALIZED UPPER ABDOMEN: Hepatomegaly.    BONES: Degenerative changes.    IMPRESSION:     Left apical nodular opacity with a conglomerate left hilar mass   compatible with malignancy till proven otherwise. Extrinsic narrowing of   the left upper lobe bronchus with additional patchy nodularity and   interlobular septal thickening in the left upper lobe may represent   lymphangitic spread disease and/or pulmonary venous   congestion/postobstructive pneumonia. This mass extends into the   pericardial and mediastinal fat.    Concern for left supraclavicular adenopathy.    < end of copied text > HPI:  84 y/o female + smoking history / COPD on 02 developed worseness of shortness of breath  , left chest pain, radiation down left arm. CT JOSHUA chest mass/ hilar mass, left supraclavicular node: biopsy small cell cancer  Advised Radiation / chemotherapy and has been reluctant to undergo therapy at this time        PAST MEDICAL & SURGICAL HISTORY:  Rheumatoid arthritis  Lung mass  HLD (hyperlipidemia)  HTN (hypertension)  COPD (chronic obstructive pulmonary disease)  Carotid stent occlusion, initial encounter  H/O hernia repair  Ectopic thyroid tissue      MEDICATIONS  (STANDING):  ALBUTerol/ipratropium for Nebulization 3 milliLiter(s) Nebulizer every 6 hours  apixaban 5 milliGRAM(s) Oral every 12 hours  aspirin enteric coated 81 milliGRAM(s) Oral daily  atorvastatin 40 milliGRAM(s) Oral at bedtime  buDESOnide 160 MICROgram(s)/formoterol 4.5 MICROgram(s) Inhaler 2 Puff(s) Inhalation two times a day  dextrose 5%. 1000 milliLiter(s) (50 mL/Hr) IV Continuous <Continuous>  dextrose 50% Injectable 12.5 Gram(s) IV Push once  dextrose 50% Injectable 25 Gram(s) IV Push once  dextrose 50% Injectable 25 Gram(s) IV Push once  diltiazem    milliGRAM(s) Oral daily  diltiazem Infusion 5 mG/Hr (5 mL/Hr) IV Continuous <Continuous>  fentaNYL   Patch  25 MICROgram(s)/Hr 1 Patch Transdermal every 72 hours  gabapentin 300 milliGRAM(s) Oral three times a day  guaiFENesin  milliGRAM(s) Oral every 12 hours  insulin lispro (HumaLOG) corrective regimen sliding scale   SubCutaneous three times a day before meals  losartan 50 milliGRAM(s) Oral daily  methylPREDNISolone sodium succinate Injectable 60 milliGRAM(s) IV Push three times a day  senna 2 Tablet(s) Oral at bedtime    MEDICATIONS  (PRN):  ALPRAZolam 0.25 milliGRAM(s) Oral two times a day PRN Anxiety  dextrose 40% Gel 15 Gram(s) Oral once PRN Blood Glucose LESS THAN 70 milliGRAM(s)/deciliter  glucagon  Injectable 1 milliGRAM(s) IntraMuscular once PRN Glucose LESS THAN 70 milligrams/deciliter  morphine  - Injectable 4 milliGRAM(s) IV Push every 4 hours PRN Severe Pain (7 - 10)  morphine  - Injectable 2 milliGRAM(s) IV Push every 4 hours PRN Moderate Pain (4 - 6)  morphine Concentrate 5 milliGRAM(s) Oral every 4 hours PRN Moderate Pain (4 - 6) to Severe Pain (7 - 10)      Allergies    No Known Allergies    Intolerances        SOCIAL HISTORY: + smoking history > 40 pack years; no sig ETOH      Vital Signs Last 24 Hrs  T(C): 36.5 (28 Sep 2019 05:00), Max: 36.6 (27 Sep 2019 17:47)  T(F): 97.7 (28 Sep 2019 05:00), Max: 97.8 (27 Sep 2019 17:47)  HR: 97 (28 Sep 2019 05:00) (82 - 108)  BP: 105/68 (28 Sep 2019 05:00) (105/68 - 128/77)  BP(mean): --  RR: 17 (28 Sep 2019 05:00) (17 - 18)  SpO2: 97% (28 Sep 2019 05:00) (93% - 97%)    PHYSICAL EXAM:      Constitutional: elderly, spry / Appears comfortable, in  NAD, A/O X 3   On O2  Eyes: EOMI, PERRLA ;No icterus    ENMT:  No oral lesions, thrush; pharynx not injected    Neck:  Supple; No masses, lymph nodes, no bruits    Breasts: NA    Back: No tenderness     Respiratory:  Clear to A/P, No wheezes, rhonchi, rales. No dullness to percussion    Cardiovascular: Normal rate and rhythm; normal S1 and S2; No murmurs. No gallop    Gastrointestinal: No distension, normal bowl sounds; No tendeness , guarding, rebound;  no masses, no hepatosplenimegaly, no fluid wave    Genitourinary: No flank pains, no inguninal adenopathy    Rectal: NA    Extremities:  No phlebitis, no edema    Vascular: No acrocyanosis, no edema    Neurological: Alert and oriented. Cranial nerves II-XII WNL, Upper extremity / lower extremity  strength WNL;  Reflexes WNL, Plantar downgoing ; No cerebellar signs    Skin: No rash, petechiae purpura, ecchymoses    Lymph Nodes: No cervical, supraclavicular, axillary, inguinal adenopathy    Musculoskeletal: No joint swelling    Psychiatric: Alert, oriented, normal affect          LABS:                        11.9   16.82 )-----------( 237      ( 28 Sep 2019 06:33 )             36.5     09-28    141  |  109<H>  |  25<H>  ----------------------------<  155<H>  4.3   |  26  |  0.71    Ca    8.6      28 Sep 2019 06:33            RADIOLOGY & ADDITIONAL STUDIES:    < from: CT Chest No Cont (09.13.19 @ 16:04) >  EXAM:  CT CHEST                            PROCEDURE DATE:  09/13/2019          INTERPRETATION:  CLINICAL INFORMATION: Left upper lobe mass    COMPARISON: None.    PROCEDURE:   CT of the Chest was performed without intravenous contrast.  Sagittal and coronal reformats were performed.      FINDINGS:    LUNGS, AIRWAYS, MEDIASTINUM AND MIGDALIA: Large left suprahilar mass   measuring at least 6.4 x 4.5 cm which demonstrates marked extrinsic   compression on the left upper lobe bronchus and extends into the   mediastinal fat and left upper lobe parenchyma compatible with   malignancy. Left apical nodular mass measuring 1.6 cm. Additional patchy   opacities in the left upper lobe with interlobular septal thickening may   represent a combination of metastatic spread of disease pulmonary venous   congestion and/or postobstructive pneumonia. Additional mediastinal and   hilar adenopathy. Severe emphysema. No pneumothorax. Right medial basilar   subsegmental atelectasis. Right apical pleural parenchymal scarring.    PLEURA: Small left pleural effusion.    VESSELS: Atherosclerotic changes of the aorta and coronary vasculature.    HEART: Heart size is normal. No pericardial effusion.    CHEST WALL AND LOWER NECK: Concern for left supraclavicular adenopathy.   The left apical nodular density extends into the medial left apical   pleural fat.    VISUALIZED UPPER ABDOMEN: Hepatomegaly.    BONES: Degenerative changes.    IMPRESSION:     Left apical nodular opacity with a conglomerate left hilar mass   compatible with malignancy till proven otherwise. Extrinsic narrowing of   the left upper lobe bronchus with additional patchy nodularity and   interlobular septal thickening in the left upper lobe may represent   lymphangitic spread disease and/or pulmonary venous   congestion/postobstructive pneumonia. This mass extends into the   pericardial and mediastinal fat.    Concern for left supraclavicular adenopathy.    < end of copied text >

## 2019-09-28 NOTE — PROGRESS NOTE ADULT - ASSESSMENT
1. Acute on chronic hypoxic respiratory failure  - due to Copd exacerbation, small cell lung ca  - Taper IV steroids as wheezing is controlled, nebs   - monitor O2 sats - currently 98% on 2LNC   - will need home O2 evaluation    2. small cell lung ca  - 2nd opinion for hemeonc requested by PMD, patient agrees for treatment.   - Palliative care consult appreciated- [facility] meeting Monday 10am  - pain control- fentanyl, morphine    3. afib w/ rvr  - controlled on po cardizem  - eliquis     4. HTN  - Cont. losartan     5. HLD  - Cont. Statin     6. Anxiety  - Cont. xanax PRN BID    7. DVT PPX  - Eliquis     Code status: DNR/DNI- MOLST

## 2019-09-29 LAB
ANION GAP SERPL CALC-SCNC: 8 MMOL/L — SIGNIFICANT CHANGE UP (ref 5–17)
BUN SERPL-MCNC: 28 MG/DL — HIGH (ref 7–23)
CALCIUM SERPL-MCNC: 8.6 MG/DL — SIGNIFICANT CHANGE UP (ref 8.5–10.1)
CHLORIDE SERPL-SCNC: 107 MMOL/L — SIGNIFICANT CHANGE UP (ref 96–108)
CO2 SERPL-SCNC: 25 MMOL/L — SIGNIFICANT CHANGE UP (ref 22–31)
CREAT SERPL-MCNC: 0.68 MG/DL — SIGNIFICANT CHANGE UP (ref 0.5–1.3)
GLUCOSE SERPL-MCNC: 134 MG/DL — HIGH (ref 70–99)
HCT VFR BLD CALC: 40 % — SIGNIFICANT CHANGE UP (ref 34.5–45)
HGB BLD-MCNC: 12.8 G/DL — SIGNIFICANT CHANGE UP (ref 11.5–15.5)
MCHC RBC-ENTMCNC: 31.2 PG — SIGNIFICANT CHANGE UP (ref 27–34)
MCHC RBC-ENTMCNC: 32 GM/DL — SIGNIFICANT CHANGE UP (ref 32–36)
MCV RBC AUTO: 97.6 FL — SIGNIFICANT CHANGE UP (ref 80–100)
PLATELET # BLD AUTO: 271 K/UL — SIGNIFICANT CHANGE UP (ref 150–400)
POTASSIUM SERPL-MCNC: 4.5 MMOL/L — SIGNIFICANT CHANGE UP (ref 3.5–5.3)
POTASSIUM SERPL-SCNC: 4.5 MMOL/L — SIGNIFICANT CHANGE UP (ref 3.5–5.3)
RBC # BLD: 4.1 M/UL — SIGNIFICANT CHANGE UP (ref 3.8–5.2)
RBC # FLD: 13.9 % — SIGNIFICANT CHANGE UP (ref 10.3–14.5)
SODIUM SERPL-SCNC: 140 MMOL/L — SIGNIFICANT CHANGE UP (ref 135–145)
WBC # BLD: 16.57 K/UL — HIGH (ref 3.8–10.5)
WBC # FLD AUTO: 16.57 K/UL — HIGH (ref 3.8–10.5)

## 2019-09-29 RX ORDER — DIPHENHYDRAMINE HCL 50 MG
25 CAPSULE ORAL ONCE
Refills: 0 | Status: COMPLETED | OUTPATIENT
Start: 2019-09-29 | End: 2019-09-29

## 2019-09-29 RX ORDER — ONDANSETRON 8 MG/1
4 TABLET, FILM COATED ORAL ONCE
Refills: 0 | Status: COMPLETED | OUTPATIENT
Start: 2019-09-29 | End: 2019-09-29

## 2019-09-29 RX ORDER — CARBOPLATIN 50 MG
408 VIAL (EA) INTRAVENOUS ONCE
Refills: 0 | Status: COMPLETED | OUTPATIENT
Start: 2019-09-29 | End: 2019-09-29

## 2019-09-29 RX ORDER — ETOPOSIDE 20 MG/ML
140 VIAL (ML) INTRAVENOUS ONCE
Refills: 0 | Status: COMPLETED | OUTPATIENT
Start: 2019-09-29 | End: 2019-09-29

## 2019-09-29 RX ORDER — APREPITANT 80 MG/1
125 CAPSULE ORAL ONCE
Refills: 0 | Status: COMPLETED | OUTPATIENT
Start: 2019-09-29 | End: 2019-09-29

## 2019-09-29 RX ORDER — MORPHINE SULFATE 50 MG/1
3 CAPSULE, EXTENDED RELEASE ORAL EVERY 4 HOURS
Refills: 0 | Status: DISCONTINUED | OUTPATIENT
Start: 2019-09-29 | End: 2019-09-30

## 2019-09-29 RX ORDER — PANTOPRAZOLE SODIUM 20 MG/1
40 TABLET, DELAYED RELEASE ORAL ONCE
Refills: 0 | Status: COMPLETED | OUTPATIENT
Start: 2019-09-29 | End: 2019-09-29

## 2019-09-29 RX ORDER — MORPHINE SULFATE 50 MG/1
5 CAPSULE, EXTENDED RELEASE ORAL EVERY 4 HOURS
Refills: 0 | Status: DISCONTINUED | OUTPATIENT
Start: 2019-09-29 | End: 2019-09-30

## 2019-09-29 RX ADMIN — LOSARTAN POTASSIUM 50 MILLIGRAM(S): 100 TABLET, FILM COATED ORAL at 06:37

## 2019-09-29 RX ADMIN — Medication 40 MILLIGRAM(S): at 13:20

## 2019-09-29 RX ADMIN — Medication 507 MILLIGRAM(S): at 16:56

## 2019-09-29 RX ADMIN — MORPHINE SULFATE 5 MILLIGRAM(S): 50 CAPSULE, EXTENDED RELEASE ORAL at 12:08

## 2019-09-29 RX ADMIN — FENTANYL CITRATE 1 PATCH: 50 INJECTION INTRAVENOUS at 19:40

## 2019-09-29 RX ADMIN — Medication 0.25 MILLIGRAM(S): at 21:30

## 2019-09-29 RX ADMIN — Medication 600 MILLIGRAM(S): at 17:02

## 2019-09-29 RX ADMIN — Medication 60 MILLIGRAM(S): at 06:37

## 2019-09-29 RX ADMIN — Medication 3 MILLILITER(S): at 09:44

## 2019-09-29 RX ADMIN — Medication 2: at 12:10

## 2019-09-29 RX ADMIN — GABAPENTIN 300 MILLIGRAM(S): 400 CAPSULE ORAL at 21:30

## 2019-09-29 RX ADMIN — MORPHINE SULFATE 5 MILLIGRAM(S): 50 CAPSULE, EXTENDED RELEASE ORAL at 12:35

## 2019-09-29 RX ADMIN — APIXABAN 5 MILLIGRAM(S): 2.5 TABLET, FILM COATED ORAL at 17:02

## 2019-09-29 RX ADMIN — Medication 120 MILLIGRAM(S): at 06:37

## 2019-09-29 RX ADMIN — GABAPENTIN 300 MILLIGRAM(S): 400 CAPSULE ORAL at 06:37

## 2019-09-29 RX ADMIN — Medication 81 MILLIGRAM(S): at 12:08

## 2019-09-29 RX ADMIN — GABAPENTIN 300 MILLIGRAM(S): 400 CAPSULE ORAL at 13:26

## 2019-09-29 RX ADMIN — APIXABAN 5 MILLIGRAM(S): 2.5 TABLET, FILM COATED ORAL at 06:37

## 2019-09-29 RX ADMIN — APREPITANT 125 MILLIGRAM(S): 80 CAPSULE ORAL at 13:44

## 2019-09-29 RX ADMIN — BUDESONIDE AND FORMOTEROL FUMARATE DIHYDRATE 2 PUFF(S): 160; 4.5 AEROSOL RESPIRATORY (INHALATION) at 09:43

## 2019-09-29 RX ADMIN — MORPHINE SULFATE 4 MILLIGRAM(S): 50 CAPSULE, EXTENDED RELEASE ORAL at 21:30

## 2019-09-29 RX ADMIN — PANTOPRAZOLE SODIUM 40 MILLIGRAM(S): 20 TABLET, DELAYED RELEASE ORAL at 13:24

## 2019-09-29 RX ADMIN — ONDANSETRON 4 MILLIGRAM(S): 8 TABLET, FILM COATED ORAL at 13:22

## 2019-09-29 RX ADMIN — Medication 290.8 MILLIGRAM(S): at 14:34

## 2019-09-29 RX ADMIN — ATORVASTATIN CALCIUM 40 MILLIGRAM(S): 80 TABLET, FILM COATED ORAL at 21:30

## 2019-09-29 RX ADMIN — SENNA PLUS 2 TABLET(S): 8.6 TABLET ORAL at 21:30

## 2019-09-29 RX ADMIN — Medication 25 MILLIGRAM(S): at 13:23

## 2019-09-29 RX ADMIN — Medication 40 MILLIGRAM(S): at 21:30

## 2019-09-29 RX ADMIN — FENTANYL CITRATE 1 PATCH: 50 INJECTION INTRAVENOUS at 08:51

## 2019-09-29 RX ADMIN — Medication 600 MILLIGRAM(S): at 06:37

## 2019-09-29 NOTE — PROGRESS NOTE ADULT - SUBJECTIVE AND OBJECTIVE BOX
HOSPITALIST ATTENDING PROGRESS NOTE    Chart and meds reviewed.  Patient seen and examined.    HPI: No acute events, sister at bedside, awaiting chemo tx.    All 10 systems reviewed and found to be negative with the exception of what has been described above.    MEDICATIONS  (STANDING):  ALBUTerol/ipratropium for Nebulization 3 milliLiter(s) Nebulizer every 6 hours  apixaban 5 milliGRAM(s) Oral every 12 hours  aprepitant 125 milliGRAM(s) Oral once  aspirin enteric coated 81 milliGRAM(s) Oral daily  atorvastatin 40 milliGRAM(s) Oral at bedtime  buDESOnide 160 MICROgram(s)/formoterol 4.5 MICROgram(s) Inhaler 2 Puff(s) Inhalation two times a day  CARBOplatin IVPB (eMAR) 408 milliGRAM(s) IV Intermittent once  dextrose 5%. 1000 milliLiter(s) (50 mL/Hr) IV Continuous <Continuous>  dextrose 50% Injectable 12.5 Gram(s) IV Push once  dextrose 50% Injectable 25 Gram(s) IV Push once  dextrose 50% Injectable 25 Gram(s) IV Push once  diltiazem    milliGRAM(s) Oral daily  diltiazem Infusion 5 mG/Hr (5 mL/Hr) IV Continuous <Continuous>  diphenhydrAMINE   Injectable 25 milliGRAM(s) IV Push once  etoposide IVPB (eMAR) 140 milliGRAM(s) IV Intermittent once  fentaNYL   Patch  25 MICROgram(s)/Hr 1 Patch Transdermal every 72 hours  gabapentin 300 milliGRAM(s) Oral three times a day  guaiFENesin  milliGRAM(s) Oral every 12 hours  insulin lispro (HumaLOG) corrective regimen sliding scale   SubCutaneous three times a day before meals  losartan 50 milliGRAM(s) Oral daily  methylPREDNISolone sodium succinate Injectable 60 milliGRAM(s) IV Push three times a day  ondansetron Injectable 4 milliGRAM(s) IV Push once  pantoprazole  Injectable 40 milliGRAM(s) IV Push once  senna 2 Tablet(s) Oral at bedtime    MEDICATIONS  (PRN):  ALPRAZolam 0.25 milliGRAM(s) Oral two times a day PRN Anxiety  dextrose 40% Gel 15 Gram(s) Oral once PRN Blood Glucose LESS THAN 70 milliGRAM(s)/deciliter  glucagon  Injectable 1 milliGRAM(s) IntraMuscular once PRN Glucose LESS THAN 70 milligrams/deciliter  morphine   Solution 5 milliGRAM(s) Oral every 4 hours PRN Severe Pain (7 - 10)  morphine   Solution 3 milliGRAM(s) Oral every 4 hours PRN Moderate Pain (4 - 6)  morphine  - Injectable 4 milliGRAM(s) IV Push every 4 hours PRN Severe Pain (7 - 10)  morphine  - Injectable 2 milliGRAM(s) IV Push every 4 hours PRN Moderate Pain (4 - 6)      VITALS:  T(F): 97.6 (09-29-19 @ 06:10), Max: 97.6 (09-28-19 @ 20:30)  HR: 89 (09-29-19 @ 06:10) (89 - 98)  BP: 142/69 (09-29-19 @ 06:10) (126/42 - 142/69)  RR: 19 (09-29-19 @ 06:10) (18 - 19)  SpO2: 100% (09-29-19 @ 06:10) (94% - 100%)  Wt(kg): --    I&O's Summary      CAPILLARY BLOOD GLUCOSE      POCT Blood Glucose.: 209 mg/dL (29 Sep 2019 12:01)  POCT Blood Glucose.: 123 mg/dL (29 Sep 2019 07:49)  POCT Blood Glucose.: 179 mg/dL (28 Sep 2019 21:22)  POCT Blood Glucose.: 163 mg/dL (28 Sep 2019 17:26)      PHYSICAL EXAM:    HEENT:  pupils equal and reactive, EOMI, no oropharyngeal lesions, erythema, exudates, oral thrush  NECK:   supple, no carotid bruits, no palpable lymph nodes, no thyromegaly  CV:  +S1, +S2, regular, no murmurs or rubs  RESP:   lungs clear to auscultation bilaterally, no wheezing, rales, rhonchi, good air entry bilaterally  BREAST:  not examined  GI:  abdomen soft, non-tender, non-distended, normal BS, no bruits, no abdominal masses, no palpable masses  RECTAL:  not examined  :  not examined  MSK:   normal muscle tone, no atrophy, no rigidity, no contractions  EXT:  no clubbing, no cyanosis, no edema, no calf pain, swelling or erythema  VASCULAR:  pulses equal and symmetric in the upper and lower extremities  NEURO:  AAOX3, no focal neurological deficits, follows all commands, able to move extremities spontaneously  SKIN:  no ulcers, lesions or rashes    LABS:                            12.8   16.57 )-----------( 271      ( 29 Sep 2019 06:31 )             40.0     09-29    140  |  107  |  28<H>  ----------------------------<  134<H>  4.5   |  25  |  0.68    Ca    8.6      29 Sep 2019 06:31                                              CULTURES:

## 2019-09-29 NOTE — PROGRESS NOTE ADULT - SUBJECTIVE AND OBJECTIVE BOX
patient appears comfortable in bed  No new complaints  Baseline dyspnea    Physical examination  85-year-old female comfortable in bed with nasal cannula O2  Neck supple without palpable lymph nodes  Lungs decreased air movement without rhonchi or wheezes  Heart S1-S2  abdomen soft nontender  Extremities without edema

## 2019-09-29 NOTE — PROGRESS NOTE ADULT - ASSESSMENT
impression  85-year-old female with long history tobacco use/COPD  Now with symptomatic small cell carcinoma of the lung  In discussion with Dr. Tay yesterday patient has opted for attempt at palliative chemotherapy  Today we'll initiate carboplatin and -16  Depending on how patient tolerates therapy may increase the -16  As outpatient can decide whether to consider immunotherapy and/or radiation as well    Patient comfortable with the plan is outlined above

## 2019-09-29 NOTE — PROGRESS NOTE ADULT - ASSESSMENT
1. Acute on chronic hypoxic respiratory failure  - due to Copd exacerbation, small cell lung ca  - Taper IV steroids as wheezing is controlled, nebs   - monitor O2 sats - currently 98% on 2LNC   - will need home O2 evaluation    2. small cell lung ca  - 2nd opinion for hemeonc requested by PMD, patient agrees for treatment.   - Palliative care consult appreciated- [facility] meeting Monday 10am  - pain control- fentanyl, morphine    3. afib w/ rvr  - controlled on po cardizem  - eliquis     4. HTN  - Cont. losartan     5. HLD  - Cont. Statin     6. Anxiety  - Cont. xanax PRN BID    7. DVT PPX  - Eliquis     Code status: DNR/DNI- MOLST

## 2019-09-30 LAB
ALBUMIN SERPL ELPH-MCNC: 2.8 G/DL — LOW (ref 3.3–5)
ALP SERPL-CCNC: 80 U/L — SIGNIFICANT CHANGE UP (ref 40–120)
ALT FLD-CCNC: 38 U/L — SIGNIFICANT CHANGE UP (ref 12–78)
ANION GAP SERPL CALC-SCNC: 7 MMOL/L — SIGNIFICANT CHANGE UP (ref 5–17)
AST SERPL-CCNC: 16 U/L — SIGNIFICANT CHANGE UP (ref 15–37)
BILIRUB SERPL-MCNC: 0.3 MG/DL — SIGNIFICANT CHANGE UP (ref 0.2–1.2)
BUN SERPL-MCNC: 28 MG/DL — HIGH (ref 7–23)
CALCIUM SERPL-MCNC: 8.4 MG/DL — LOW (ref 8.5–10.1)
CHLORIDE SERPL-SCNC: 108 MMOL/L — SIGNIFICANT CHANGE UP (ref 96–108)
CO2 SERPL-SCNC: 26 MMOL/L — SIGNIFICANT CHANGE UP (ref 22–31)
CREAT SERPL-MCNC: 0.77 MG/DL — SIGNIFICANT CHANGE UP (ref 0.5–1.3)
GLUCOSE SERPL-MCNC: 189 MG/DL — HIGH (ref 70–99)
HCT VFR BLD CALC: 38.8 % — SIGNIFICANT CHANGE UP (ref 34.5–45)
HGB BLD-MCNC: 12.5 G/DL — SIGNIFICANT CHANGE UP (ref 11.5–15.5)
MCHC RBC-ENTMCNC: 31.8 PG — SIGNIFICANT CHANGE UP (ref 27–34)
MCHC RBC-ENTMCNC: 32.2 GM/DL — SIGNIFICANT CHANGE UP (ref 32–36)
MCV RBC AUTO: 98.7 FL — SIGNIFICANT CHANGE UP (ref 80–100)
PLATELET # BLD AUTO: 226 K/UL — SIGNIFICANT CHANGE UP (ref 150–400)
POTASSIUM SERPL-MCNC: 4.7 MMOL/L — SIGNIFICANT CHANGE UP (ref 3.5–5.3)
POTASSIUM SERPL-SCNC: 4.7 MMOL/L — SIGNIFICANT CHANGE UP (ref 3.5–5.3)
PROT SERPL-MCNC: 5.6 GM/DL — LOW (ref 6–8.3)
RBC # BLD: 3.93 M/UL — SIGNIFICANT CHANGE UP (ref 3.8–5.2)
RBC # FLD: 13.7 % — SIGNIFICANT CHANGE UP (ref 10.3–14.5)
SODIUM SERPL-SCNC: 141 MMOL/L — SIGNIFICANT CHANGE UP (ref 135–145)
WBC # BLD: 12.6 K/UL — HIGH (ref 3.8–10.5)
WBC # FLD AUTO: 12.6 K/UL — HIGH (ref 3.8–10.5)

## 2019-09-30 RX ORDER — FENTANYL CITRATE 50 UG/ML
1 INJECTION INTRAVENOUS
Refills: 0 | Status: DISCONTINUED | OUTPATIENT
Start: 2019-09-30 | End: 2019-10-01

## 2019-09-30 RX ORDER — MORPHINE SULFATE 50 MG/1
5 CAPSULE, EXTENDED RELEASE ORAL EVERY 4 HOURS
Refills: 0 | Status: DISCONTINUED | OUTPATIENT
Start: 2019-09-30 | End: 2019-10-02

## 2019-09-30 RX ADMIN — Medication 600 MILLIGRAM(S): at 06:12

## 2019-09-30 RX ADMIN — Medication 2: at 18:08

## 2019-09-30 RX ADMIN — GABAPENTIN 300 MILLIGRAM(S): 400 CAPSULE ORAL at 14:18

## 2019-09-30 RX ADMIN — APIXABAN 5 MILLIGRAM(S): 2.5 TABLET, FILM COATED ORAL at 18:08

## 2019-09-30 RX ADMIN — FENTANYL CITRATE 1 PATCH: 50 INJECTION INTRAVENOUS at 11:45

## 2019-09-30 RX ADMIN — FENTANYL CITRATE 1 PATCH: 50 INJECTION INTRAVENOUS at 11:46

## 2019-09-30 RX ADMIN — Medication 81 MILLIGRAM(S): at 11:45

## 2019-09-30 RX ADMIN — Medication 1: at 08:44

## 2019-09-30 RX ADMIN — MORPHINE SULFATE 5 MILLIGRAM(S): 50 CAPSULE, EXTENDED RELEASE ORAL at 12:45

## 2019-09-30 RX ADMIN — Medication 40 MILLIGRAM(S): at 14:18

## 2019-09-30 RX ADMIN — LOSARTAN POTASSIUM 50 MILLIGRAM(S): 100 TABLET, FILM COATED ORAL at 06:13

## 2019-09-30 RX ADMIN — Medication 40 MILLIGRAM(S): at 06:12

## 2019-09-30 RX ADMIN — GABAPENTIN 300 MILLIGRAM(S): 400 CAPSULE ORAL at 06:12

## 2019-09-30 RX ADMIN — Medication 40 MILLIGRAM(S): at 22:24

## 2019-09-30 RX ADMIN — ATORVASTATIN CALCIUM 40 MILLIGRAM(S): 80 TABLET, FILM COATED ORAL at 22:23

## 2019-09-30 RX ADMIN — Medication 600 MILLIGRAM(S): at 18:08

## 2019-09-30 RX ADMIN — Medication 3 MILLILITER(S): at 15:04

## 2019-09-30 RX ADMIN — APIXABAN 5 MILLIGRAM(S): 2.5 TABLET, FILM COATED ORAL at 06:13

## 2019-09-30 RX ADMIN — Medication 120 MILLIGRAM(S): at 11:45

## 2019-09-30 RX ADMIN — FENTANYL CITRATE 1 PATCH: 50 INJECTION INTRAVENOUS at 20:09

## 2019-09-30 RX ADMIN — FENTANYL CITRATE 1 PATCH: 50 INJECTION INTRAVENOUS at 07:29

## 2019-09-30 RX ADMIN — MORPHINE SULFATE 5 MILLIGRAM(S): 50 CAPSULE, EXTENDED RELEASE ORAL at 11:46

## 2019-09-30 RX ADMIN — GABAPENTIN 300 MILLIGRAM(S): 400 CAPSULE ORAL at 22:23

## 2019-09-30 RX ADMIN — SENNA PLUS 2 TABLET(S): 8.6 TABLET ORAL at 22:23

## 2019-09-30 NOTE — PROGRESS NOTE ADULT - ASSESSMENT
impression  85 yr old lady with limited stage small cell lung cancer  tolerated dose reduced chemotherapy with carboplatin and etoposide well  usual etoposide dose is 3 days but first cycle to be shortened to 1 day as per Dr Tay    she is a candidate for addition of RT as she has limited stage disease    she has seen Dr Bunn as Op    rec  consult Dr Bunn radiation oncology    2. Disposition  she is hypoxic on RA   not ready for discharge in my opinion yet  rec  pulmonary evaluation for optimization and assessing for home O2

## 2019-09-30 NOTE — PROGRESS NOTE ADULT - ASSESSMENT
85y old Female coming from home with hx of COPD, Left Lung mass (biopsied on 9/16 found to be metastatic small cell carcinoma), Afib, RA previously on immunosuppressant tx, HLD, HTN, recently admitted 9/ for above dx, now admitted 9/25 for c/o worsening of shortness of breath w/ associated with palpitation. Found here to have COPD exacerbation c/b rapid afib (the latter resolved). Palliative Care consulted to assist with establishing GOC, as pt noted to be refusing cancer treatment.     1) Pain  - likely neuropathic pain from underlying metastatic disease with ayse involvement  - c/w gabapentin (already at a reasonable dose)  - c/w IV morphine 2mg/4mg q4h prn  - added roxanol 5mg q4h prn for longer-lasting relief  - on fentanyl 25mcg patch, will inc to 37mcg    2) Dyspnea  - multifactorial- likely due to COPD exacerbation atop new lung cancer  - c/w nebs and bronchodilators  - c/w supplemental O2  - discussed the usefulness of opioids for air hunger with pt as well, may use morphine for this as well  - awaiting pulm consult    3) Anxiety  - c/w xanax 0.25 mg, can liberalize to q8h prn    4) Rapid afib  - cardiology notes appreciated  - pt back in sinus rhythm with some paroxysmal afib  - no ACS signs as per cardio    5) Metastatic small cell ca  - reviewed onc impression from last admission  - was considering palliative chemo (Dr. Campbell) and RT (Dr. Raya)  - however pt refused this upon followup with Dr. Campbell 9/26  - as per pt, Dr. Juarez suggested second opinion which she agreed with. Pt open to speaking with suggested clinician Dr. Tay, which took place over weekend  - pt agreed to initiate palliative chemo 9/29 and so far is tolerating this well  - now awaiting rad onc input from Dr. Bunn, which she is also open to    6) Debility  - quite independent before this, but admits to walking with walker and her family being concerned about fall  - PPS 40-50% now due to sx  - evidence of malnutrition  - recommend nutrition/dietary consult  - also recommend PT since pt awaiting her walker at home and has concern about falls (suggest small dose of morphine beforehand due to DUBOIS)    7) GOC/Advanced Directives  - pt has capacity for decision making, is clear about her feelings, and reasonable enough to consider other opinions  - HCP on file naming her sister Lety Cardenas [phone]  - MOLST with DNR and DNI on the chart  - GOC meeting with pt and sister arranged for Monday at 10am to give them time to hear second onc opinion.    Thank you for including us in Ms. Juarez's care. Will continue to follow with you.    Douglas Mcclure MD  Palliative Care Attending 85y old Female coming from home with hx of COPD, Left Lung mass (biopsied on 9/16 found to be metastatic small cell carcinoma), Afib, RA previously on immunosuppressant tx, HLD, HTN, recently admitted 9/ for above dx, now admitted 9/25 for c/o worsening of shortness of breath w/ associated with palpitation. Found here to have COPD exacerbation c/b rapid afib (the latter resolved). Palliative Care consulted to assist with establishing GOC, as pt noted to be refusing cancer treatment.     1) Pain  - likely neuropathic pain from underlying metastatic disease with ayse involvement  - c/w gabapentin (already at a reasonable dose)  - c/w IV morphine 2mg/4mg q4h prn  - added roxanol 5mg q4h prn for longer-lasting relief (changed back to concentrate, which was changed by pharmacy, so we can include dyspnea as indication for tx)  - on fentanyl 25mcg patch, will inc to 37mcg    2) Dyspnea  - multifactorial- likely due to COPD exacerbation atop new lung cancer  - c/w nebs and bronchodilators  - c/w supplemental O2  - discussed the usefulness of opioids for air hunger with pt as well, may use morphine for this as well  - awaiting pulm consult    3) Anxiety  - c/w xanax 0.25 mg, can liberalize to q8h prn    4) Rapid afib  - cardiology notes appreciated  - pt back in sinus rhythm with some paroxysmal afib  - no ACS signs as per cardio    5) Metastatic small cell ca  - reviewed onc impression from last admission  - was considering palliative chemo (Dr. Campbell) and RT (Dr. Raya)  - however pt refused this upon followup with Dr. Campbell 9/26  - as per pt, Dr. Juarez suggested second opinion which she agreed with. Pt open to speaking with suggested clinician Dr. Tay, which took place over weekend  - pt agreed to initiate palliative chemo 9/29 and so far is tolerating this well  - now awaiting rad onc input from Dr. Bunn, which she is also open to    6) Debility  - quite independent before this, but admits to walking with walker and her family being concerned about fall  - PPS 40-50% now due to sx  - evidence of malnutrition  - recommend nutrition/dietary consult  - also recommend PT since pt awaiting her walker at home and has concern about falls (suggest small dose of morphine beforehand due to DUBOIS)    7) GOC/Advanced Directives  - pt has capacity for decision making, is clear about her feelings, and reasonable enough to consider other opinions  - HCP on file naming her sister Lety Cardenas [phone]  - MOLST revised today 9/30: DNR, limited medical interventions, DNI, send to hospital, no feeding tube, trial of IVF, determine use of abx  - GOC meeting held with pt and sister today- plan to c/w cancer treatment and return home when deemed medically stable with home palliative care services ([facility]). *Spent 50 minutes discussing GOC with family including Advance care planning, explanation and discussion of advance directives, reviewed all treatment/dispo options, and MOLST. See GOC note for further details.    Thank you for including us in Ms. Juarez's care. Will continue to follow with you.    Douglas Mcclure MD  Palliative Care Attending

## 2019-09-30 NOTE — GOALS OF CARE CONVERSATION - ADVANCED CARE PLANNING - CONVERSATION DETAILS
Met with pt and her sister to discuss medical issues and overall GOC. They explained that pt has been having more trouble at home due to mounting symptoms. Sister noting concern about mobility at home and also some forgetfulness. Pt clarified that there is nothing wrong with her brain, allowing imaging which showed no abnormality. She went on to explain that at times she gets overwhelmed by all that has to be managed including her health, her debility, and trying not to be a burden to her sister, which at times gets difficult to keep track of. We spent time normalizing this while discussing how her underlying medical issues, namely COPD and new lung cancer can also contribute to any issues with mentation. They both were interested in further review of medical diagnoses and their options moving forward.     Pt was well-versed in her plan but wanted us to review this for her sister to also be clear. Thus, we discussed her chronic and progressive medical problems including COPD and new cancer diagnosis. We reviewed the usual approach to cancer diagnosis, including oncology evaluation and assessment of possibility of cure vs. palliative options to help with sx control. Pt recalled having a better interaction with Dr. Tay this weekend, appreciating that he took his time to explain her options, not making her feel rushed. She also recalled meeting Dr. Bunn on last admission, looking forward to talking to him again to see if RT can also be added to her regimen. Both she and her sister were glad that she took the chance on starting chemo and that she is, so far, tolerating this. They both understand that her clinicians are taking this one step at a time, hopeful that she continues to tolerate treatment and that it is effective for her. Likewise, let them know that we are also awaiting pulmonary consult to help further optimize her pulmonary status. In the interim, pt confirmed feeling like oral morphine has been very helpful and being free from pain is of utmost importance to her.     She and her sister were interested in knowing how she will continue to be supported int he community. Thus, we discussed home care options. The pt shared her understanding of hospice, open to differentiating this from palliative care. They understand that for now, since the pt is pursuing disease-modifying therapy (chemo, RT, +/- immunotherapy) that home palliative plan makes the most sense. However, pt interested in knowing what backup plan will be if she cannot tolerate further treatment or decides in the future that she does not want to continue. Thus, we reviewed hospice benefit, reassuring her that Dr. Tay (who is affiliated with [facility]) could guide her further if/when ready for that transition.     We also took time to review her advanced directives as she often asked when we would talk about what to do if she "croaked." She recalled filling out some questions on MOLST form before, namely her desire to be DNR and DNI, glad that we agreed with her decisions and open to completing form in entirety. Her sister was somewhat emotional about this, but understanding and accepting of the pt's decisions. Provided copies for family and also for chart. See below for full list of decisions.     Ultimately, plan is to continue with cancer treatment with Dr. Tay, await word from Dr. Bunn cc RT, and to go home when medically stable with home palliative care plan. We will continue to follow for sx management and support. Discussed above with ILYA Morse and case management team.

## 2019-09-30 NOTE — GOALS OF CARE CONVERSATION - ADVANCED CARE PLANNING - WHAT MATTERS MOST
freedom from pain, it seems pt fears this being an issue at the end of her life, though she does not seem to fear dying, frankly discussing it without hesitation. She and sister hope for tolerance of treatment and clarity of her plan. Pt's sister shared concerns about how she will be cared for at home as she is also ailing with her own medical issues.

## 2019-09-30 NOTE — GOALS OF CARE CONVERSATION - ADVANCED CARE PLANNING - TREATMENT GUIDELINE COMMENT
MOLST decisions: DNR, limited medical interventions, DNI, send to hospital, no feeding tube, trial of IVF, determine use of abx

## 2019-09-30 NOTE — PROGRESS NOTE ADULT - SUBJECTIVE AND OBJECTIVE BOX
patient appears comfortable but is short of breath on minimal exertion  on oxygen  o2 sat 86%   tolerated chemotherapy well without any nausea or vomiting      Vital Signs Last 24 Hrs  T(C): 36.5 (30 Sep 2019 06:06), Max: 36.7 (29 Sep 2019 14:30)  T(F): 97.7 (30 Sep 2019 06:06), Max: 98 (29 Sep 2019 14:30)  HR: 99 (30 Sep 2019 06:06) (74 - 99)  BP: 107/73 (30 Sep 2019 06:06) (107/73 - 119/80)  BP(mean): --  RR: 20 (30 Sep 2019 06:06) (19 - 20)  SpO2: 94% (30 Sep 2019 06:06) (93% - 97%)  O2 sat 86% on RA    Physical examination  85-year-old female comfortable in bed with nasal cannula O2  left supraclavicular LN+  extensive wheezing both sides  Heart S1-S2  abdomen soft nontender  Extremities without edema    outside imaging studies  PET Ct  no distant mets  disease localized to chest and supraclavicular fossa

## 2019-09-30 NOTE — CHART NOTE - NSCHARTNOTEFT_GEN_A_CORE
HPI:  86 y/o female developed worseness of shortness of breath today, did not get better with her usual meds of bronchodilator, associated with palpitation.  (25 Sep 2019 17:04)      PERTINENT PMH REVIEWED:  [ X ] YES [ ] NO           Primary Contact:     HCP on file naming her sister Lety Cardenas [phone]    HCP [  X ] Surrogate [   ] Guardian [   ]    Mental Status: [ X ] Alert  [ X ] Oriented [  ] Confused [  ] Lethargic [  ]  Concerns of Depression [  ]-none identified   Anxiety [ X  ] appears anxious today   Baseline ADLs (prior to admission):  Independent [ X ] moderately [ ] fully   Dependent   [ ] moderately [ ]fully    Family Meeting attendees: Pt, pts sister Lety, Dr. Mcclure, [facility]    Anticipated Grief: Patient [ X ] Family [ X ]    Caregiver [place] Assessed: Yes [ X ] No [  ]- Pts sister expressed being concern about caring for pt. and wanted to make her limits known. Team was supportive     Goals of Care: Palliative chemotherapy and radiation and to return home     ADVANCE DIRECTIVES:  [ X ] YES [ ] NO   DNR [ X ] YES [ ] NO  DNI [ X ] YES [ ] NO Completed on:                     MOLST  [ X ] YES [ ] NO   Completed on:  Living Will  [ ] YES [ ] NO   Completed on:    Anticipated D/C Plan: return home with Palliative home care and pursue Palliative chemo and radiation outpatient                      Summary:      Team met with pt. and her sister to discuss goals of care, assist with planning and provide supportive counseling. Pt. lives alone in a apartment. She is able to walk and get around but does have issues with shortness of breath. Pts sister was very honest in expressing not being able to be her full care taker, even though she does help as much as she can. Pt. also recognized this and expressed not wanting to be a burden on her sister.     Pts current medical condition and goals of care discussed. Pt. plans to pursue outpatient Palliative chemotherapy and possibly palliative radiation as well. Pt. is not ready for a comfort focused plan of care. We reviewed home hospice and those services for the future if pt. was ever not eligible for further treatment or decided to stop. Pt. is in agreement with returning home with Palliative home care.     We completed a new MOLST as previous was not completed in full. MOLST states DNR/DNI, Limited Medical, Send to hospital, No feeding tube, Trial of IV fluids, Determine use of Antibiotics.     Plan at this time is for pt. to return home with palliative home are and follow up with oncology. Emotional support provided. Our Team will continue to follow.

## 2019-09-30 NOTE — PROGRESS NOTE ADULT - ASSESSMENT
1. Acute on chronic hypoxic respiratory failure  - due to Copd exacerbation, small cell lung ca  - Taper IV steroids as wheezing is controlled, nebs   - monitor O2 sats - currently 98% on 2LNC   - Patient qualifies for home oxygen, social work to set it up    2. small cell lung ca  - 2nd opinion for hemeonc requested by PMD, patient agrees for treatment.   - Palliative care consult appreciated- [facility] meeting Today  - pain control- fentanyl, morphine    3. afib w/ rvr  - controlled on po cardizem  - eliquis     4. HTN  - Cont. losartan     5. HLD  - Cont. Statin     6. Anxiety  - Cont. xanax PRN BID    7. DVT PPX  - Eliquis     Code status: DNR/DNI- MOLST    Will need home oxygen, rad onc/pulm evaluation   d/w , , patient

## 2019-09-30 NOTE — PROGRESS NOTE ADULT - SUBJECTIVE AND OBJECTIVE BOX
HPI: Pt seen and examined this am in follow up for sx and GOC, no family at bedside. Pt still quite winded, did not have her O2 on, replaced. Pt denies needing anything for this, noting this is normal for her to get winded while walking and while talking. She denies pain currently. Aware of getting morphine IV for pain, open to po, saying she knows she will need this when she goes home. Sister Lety supposed to come in for 10am meeting, pt called her home to ensure she left with no answer.       PAIN: denies at present    DYSPNEA: yes      ROS:  All other systems reviewed and negative      PHYSICAL EXAM:    Vital Signs Last 24 Hrs  T(C): 36.5 (30 Sep 2019 06:06), Max: 36.7 (29 Sep 2019 14:30)  T(F): 97.7 (30 Sep 2019 06:06), Max: 98 (29 Sep 2019 14:30)  HR: 99 (30 Sep 2019 06:06) (74 - 99)  BP: 107/73 (30 Sep 2019 06:06) (107/73 - 119/80)  RR: 20 (30 Sep 2019 06:06) (19 - 20)  SpO2: 94% (30 Sep 2019 06:06) (93% - 97%)     PPSV2: 30  %    General: Elderly female sitting up in bed, breathless at times, pleasant, thin  Mental Status: AOx4  HEENT: mmm, perrl, nc in place, +temporal and clavicular wasting  Lungs: dec at bases bl, dyspneic  Cardiac: +s1 s2 tachy  GI: soft nt nd +bs  : voids  Ext: moves all extremities, muscle and fat wasting throughout  Neuro: no focal deficits, though some ttp along arm     LABS:                        12.5   12.60 )-----------( 226      ( 30 Sep 2019 07:13 )             38.8     09-30    141  |  108  |  28<H>  ----------------------------<  189<H>  4.7   |  26  |  0.77    Ca    8.4<L>      30 Sep 2019 07:13    TPro  5.6<L>  /  Alb  2.8<L>  /  TBili  0.3  /  DBili  x   /  AST  16  /  ALT  38  /  AlkPhos  80  09-30      Albumin: Albumin, Serum: 2.8 g/dL (09-30 @ 07:13)      Allergies    No Known Allergies    Intolerances      MEDICATIONS  (STANDING):  ALBUTerol/ipratropium for Nebulization 3 milliLiter(s) Nebulizer every 6 hours  apixaban 5 milliGRAM(s) Oral every 12 hours  aspirin enteric coated 81 milliGRAM(s) Oral daily  atorvastatin 40 milliGRAM(s) Oral at bedtime  buDESOnide 160 MICROgram(s)/formoterol 4.5 MICROgram(s) Inhaler 2 Puff(s) Inhalation two times a day  dextrose 5%. 1000 milliLiter(s) (50 mL/Hr) IV Continuous <Continuous>  dextrose 50% Injectable 12.5 Gram(s) IV Push once  dextrose 50% Injectable 25 Gram(s) IV Push once  dextrose 50% Injectable 25 Gram(s) IV Push once  diltiazem    milliGRAM(s) Oral daily  diltiazem Infusion 5 mG/Hr (5 mL/Hr) IV Continuous <Continuous>  fentaNYL   Patch  25 MICROgram(s)/Hr 1 Patch Transdermal every 72 hours  gabapentin 300 milliGRAM(s) Oral three times a day  guaiFENesin  milliGRAM(s) Oral every 12 hours  insulin lispro (HumaLOG) corrective regimen sliding scale   SubCutaneous three times a day before meals  losartan 50 milliGRAM(s) Oral daily  methylPREDNISolone sodium succinate Injectable 40 milliGRAM(s) IV Push every 8 hours  senna 2 Tablet(s) Oral at bedtime    MEDICATIONS  (PRN):  ALPRAZolam 0.25 milliGRAM(s) Oral two times a day PRN Anxiety  dextrose 40% Gel 15 Gram(s) Oral once PRN Blood Glucose LESS THAN 70 milliGRAM(s)/deciliter  glucagon  Injectable 1 milliGRAM(s) IntraMuscular once PRN Glucose LESS THAN 70 milligrams/deciliter  morphine   Solution 5 milliGRAM(s) Oral every 4 hours PRN Severe Pain (7 - 10)  morphine   Solution 3 milliGRAM(s) Oral every 4 hours PRN Moderate Pain (4 - 6)  morphine  - Injectable 4 milliGRAM(s) IV Push every 4 hours PRN Severe Pain (7 - 10)  morphine  - Injectable 2 milliGRAM(s) IV Push every 4 hours PRN Moderate Pain (4 - 6) HPI: Pt seen and examined this am in follow up for sx and GOC, no family at bedside. Pt still quite winded, did not have her O2 on, replaced. Pt denies needing anything for this, noting this is normal for her to get winded while walking and while talking. She denies pain currently. Aware of getting morphine IV for pain, open to po, saying she knows she will need this when she goes home. Sister Lety supposed to come in for 10am meeting, pt called her home to ensure she left with no answer. Sister showed up shortly afterward and we proceeded with meeting, see [facility] note for details.       PAIN: denies at present    DYSPNEA: yes      ROS:  All other systems reviewed and negative      PHYSICAL EXAM:    Vital Signs Last 24 Hrs  T(C): 36.5 (30 Sep 2019 06:06), Max: 36.7 (29 Sep 2019 14:30)  T(F): 97.7 (30 Sep 2019 06:06), Max: 98 (29 Sep 2019 14:30)  HR: 99 (30 Sep 2019 06:06) (74 - 99)  BP: 107/73 (30 Sep 2019 06:06) (107/73 - 119/80)  RR: 20 (30 Sep 2019 06:06) (19 - 20)  SpO2: 94% (30 Sep 2019 06:06) (93% - 97%)     PPSV2: 30  %    General: Elderly female sitting up in bed, breathless at times, pleasant, thin  Mental Status: AOx4  HEENT: mmm, perrl, nc in place, +temporal and clavicular wasting  Lungs: dec at bases bl, dyspneic  Cardiac: +s1 s2 tachy  GI: soft nt nd +bs  : voids  Ext: moves all extremities, muscle and fat wasting throughout  Neuro: no focal deficits, though some ttp along arm     LABS:                        12.5   12.60 )-----------( 226      ( 30 Sep 2019 07:13 )             38.8     09-30    141  |  108  |  28<H>  ----------------------------<  189<H>  4.7   |  26  |  0.77    Ca    8.4<L>      30 Sep 2019 07:13    TPro  5.6<L>  /  Alb  2.8<L>  /  TBili  0.3  /  DBili  x   /  AST  16  /  ALT  38  /  AlkPhos  80  09-30      Albumin: Albumin, Serum: 2.8 g/dL (09-30 @ 07:13)      Allergies    No Known Allergies    Intolerances      MEDICATIONS  (STANDING):  ALBUTerol/ipratropium for Nebulization 3 milliLiter(s) Nebulizer every 6 hours  apixaban 5 milliGRAM(s) Oral every 12 hours  aspirin enteric coated 81 milliGRAM(s) Oral daily  atorvastatin 40 milliGRAM(s) Oral at bedtime  buDESOnide 160 MICROgram(s)/formoterol 4.5 MICROgram(s) Inhaler 2 Puff(s) Inhalation two times a day  dextrose 5%. 1000 milliLiter(s) (50 mL/Hr) IV Continuous <Continuous>  dextrose 50% Injectable 12.5 Gram(s) IV Push once  dextrose 50% Injectable 25 Gram(s) IV Push once  dextrose 50% Injectable 25 Gram(s) IV Push once  diltiazem    milliGRAM(s) Oral daily  diltiazem Infusion 5 mG/Hr (5 mL/Hr) IV Continuous <Continuous>  fentaNYL   Patch  25 MICROgram(s)/Hr 1 Patch Transdermal every 72 hours  gabapentin 300 milliGRAM(s) Oral three times a day  guaiFENesin  milliGRAM(s) Oral every 12 hours  insulin lispro (HumaLOG) corrective regimen sliding scale   SubCutaneous three times a day before meals  losartan 50 milliGRAM(s) Oral daily  methylPREDNISolone sodium succinate Injectable 40 milliGRAM(s) IV Push every 8 hours  senna 2 Tablet(s) Oral at bedtime    MEDICATIONS  (PRN):  ALPRAZolam 0.25 milliGRAM(s) Oral two times a day PRN Anxiety  dextrose 40% Gel 15 Gram(s) Oral once PRN Blood Glucose LESS THAN 70 milliGRAM(s)/deciliter  glucagon  Injectable 1 milliGRAM(s) IntraMuscular once PRN Glucose LESS THAN 70 milligrams/deciliter  morphine   Solution 5 milliGRAM(s) Oral every 4 hours PRN Severe Pain (7 - 10)  morphine   Solution 3 milliGRAM(s) Oral every 4 hours PRN Moderate Pain (4 - 6)  morphine  - Injectable 4 milliGRAM(s) IV Push every 4 hours PRN Severe Pain (7 - 10)  morphine  - Injectable 2 milliGRAM(s) IV Push every 4 hours PRN Moderate Pain (4 - 6)

## 2019-10-01 LAB
ANION GAP SERPL CALC-SCNC: 9 MMOL/L — SIGNIFICANT CHANGE UP (ref 5–17)
BUN SERPL-MCNC: 28 MG/DL — HIGH (ref 7–23)
CALCIUM SERPL-MCNC: 8.1 MG/DL — LOW (ref 8.5–10.1)
CHLORIDE SERPL-SCNC: 107 MMOL/L — SIGNIFICANT CHANGE UP (ref 96–108)
CO2 SERPL-SCNC: 24 MMOL/L — SIGNIFICANT CHANGE UP (ref 22–31)
CREAT SERPL-MCNC: 0.71 MG/DL — SIGNIFICANT CHANGE UP (ref 0.5–1.3)
GLUCOSE SERPL-MCNC: 146 MG/DL — HIGH (ref 70–99)
HCT VFR BLD CALC: 38.2 % — SIGNIFICANT CHANGE UP (ref 34.5–45)
HGB BLD-MCNC: 12.7 G/DL — SIGNIFICANT CHANGE UP (ref 11.5–15.5)
MCHC RBC-ENTMCNC: 31.7 PG — SIGNIFICANT CHANGE UP (ref 27–34)
MCHC RBC-ENTMCNC: 33.2 GM/DL — SIGNIFICANT CHANGE UP (ref 32–36)
MCV RBC AUTO: 95.3 FL — SIGNIFICANT CHANGE UP (ref 80–100)
PLATELET # BLD AUTO: 227 K/UL — SIGNIFICANT CHANGE UP (ref 150–400)
POTASSIUM SERPL-MCNC: 4.5 MMOL/L — SIGNIFICANT CHANGE UP (ref 3.5–5.3)
POTASSIUM SERPL-SCNC: 4.5 MMOL/L — SIGNIFICANT CHANGE UP (ref 3.5–5.3)
RBC # BLD: 4.01 M/UL — SIGNIFICANT CHANGE UP (ref 3.8–5.2)
RBC # FLD: 13.5 % — SIGNIFICANT CHANGE UP (ref 10.3–14.5)
SODIUM SERPL-SCNC: 140 MMOL/L — SIGNIFICANT CHANGE UP (ref 135–145)
WBC # BLD: 15.02 K/UL — HIGH (ref 3.8–10.5)
WBC # FLD AUTO: 15.02 K/UL — HIGH (ref 3.8–10.5)

## 2019-10-01 RX ORDER — FENTANYL CITRATE 50 UG/ML
1 INJECTION INTRAVENOUS
Refills: 0 | Status: DISCONTINUED | OUTPATIENT
Start: 2019-10-01 | End: 2019-10-02

## 2019-10-01 RX ADMIN — Medication 600 MILLIGRAM(S): at 18:15

## 2019-10-01 RX ADMIN — Medication 1: at 08:26

## 2019-10-01 RX ADMIN — FENTANYL CITRATE 1 PATCH: 50 INJECTION INTRAVENOUS at 20:00

## 2019-10-01 RX ADMIN — Medication 1: at 11:28

## 2019-10-01 RX ADMIN — Medication 3 MILLILITER(S): at 15:31

## 2019-10-01 RX ADMIN — Medication 81 MILLIGRAM(S): at 11:22

## 2019-10-01 RX ADMIN — LOSARTAN POTASSIUM 50 MILLIGRAM(S): 100 TABLET, FILM COATED ORAL at 06:18

## 2019-10-01 RX ADMIN — Medication 3 MILLILITER(S): at 07:52

## 2019-10-01 RX ADMIN — GABAPENTIN 300 MILLIGRAM(S): 400 CAPSULE ORAL at 14:29

## 2019-10-01 RX ADMIN — FENTANYL CITRATE 1 PATCH: 50 INJECTION INTRAVENOUS at 12:47

## 2019-10-01 RX ADMIN — SENNA PLUS 2 TABLET(S): 8.6 TABLET ORAL at 21:21

## 2019-10-01 RX ADMIN — Medication 3 MILLILITER(S): at 02:54

## 2019-10-01 RX ADMIN — Medication 600 MILLIGRAM(S): at 06:18

## 2019-10-01 RX ADMIN — FENTANYL CITRATE 1 PATCH: 50 INJECTION INTRAVENOUS at 09:27

## 2019-10-01 RX ADMIN — Medication 40 MILLIGRAM(S): at 06:18

## 2019-10-01 RX ADMIN — Medication 3 MILLILITER(S): at 20:44

## 2019-10-01 RX ADMIN — GABAPENTIN 300 MILLIGRAM(S): 400 CAPSULE ORAL at 21:21

## 2019-10-01 RX ADMIN — APIXABAN 5 MILLIGRAM(S): 2.5 TABLET, FILM COATED ORAL at 06:18

## 2019-10-01 RX ADMIN — FENTANYL CITRATE 1 PATCH: 50 INJECTION INTRAVENOUS at 12:46

## 2019-10-01 RX ADMIN — BUDESONIDE AND FORMOTEROL FUMARATE DIHYDRATE 2 PUFF(S): 160; 4.5 AEROSOL RESPIRATORY (INHALATION) at 20:44

## 2019-10-01 RX ADMIN — Medication 20 MILLIGRAM(S): at 21:21

## 2019-10-01 RX ADMIN — Medication 120 MILLIGRAM(S): at 06:27

## 2019-10-01 RX ADMIN — GABAPENTIN 300 MILLIGRAM(S): 400 CAPSULE ORAL at 06:18

## 2019-10-01 RX ADMIN — Medication 40 MILLIGRAM(S): at 14:29

## 2019-10-01 RX ADMIN — ATORVASTATIN CALCIUM 40 MILLIGRAM(S): 80 TABLET, FILM COATED ORAL at 21:21

## 2019-10-01 RX ADMIN — MORPHINE SULFATE 5 MILLIGRAM(S): 50 CAPSULE, EXTENDED RELEASE ORAL at 03:30

## 2019-10-01 RX ADMIN — APIXABAN 5 MILLIGRAM(S): 2.5 TABLET, FILM COATED ORAL at 18:15

## 2019-10-01 RX ADMIN — BUDESONIDE AND FORMOTEROL FUMARATE DIHYDRATE 2 PUFF(S): 160; 4.5 AEROSOL RESPIRATORY (INHALATION) at 07:52

## 2019-10-01 NOTE — PROGRESS NOTE ADULT - SUBJECTIVE AND OBJECTIVE BOX
HPI: Pt seen and examined this am in follow up for sx. Pt notes same level of dyspnea she has chronically, not bothered by this and not more than usual. She denies pain, otherwise feeling well. She was surprised by how upbeat she is, grateful for some positive energy in the midst of all she is enduring. She knows she will need some help at home, but is currently content with her plan to move forward with more treatments.       PAIN: denies, notes fentanyl patch fell off, RN replacing    DYSPNEA: as above      ROS:    All other systems reviewed and negative      PHYSICAL EXAM:    Vital Signs Last 24 Hrs  T(C): 36.6 (01 Oct 2019 05:56), Max: 36.6 (30 Sep 2019 20:19)  T(F): 97.8 (01 Oct 2019 05:56), Max: 97.8 (30 Sep 2019 20:19)  HR: 82 (01 Oct 2019 09:31) (78 - 92)  BP: 129/65 (01 Oct 2019 05:56) (119/60 - 129/65)  BP(mean): --  RR: 19 (01 Oct 2019 05:56) (19 - 20)  SpO2: 96% (01 Oct 2019 09:31) (89% - 96%)  Daily     Daily     PPSV2: 30  %    General: Elderly female sitting up in bed, breathless at times, pleasant, thin  Mental Status: AOx4  HEENT: mmm, perrl, nc in place, +temporal and clavicular wasting  Lungs: dec at bases bl, dyspneic  Cardiac: +s1 s2 tachy  GI: soft nt nd +bs  : voids  Ext: moves all extremities, muscle and fat wasting throughout  Neuro: no focal deficits, though some ttp along arm     LABS:                        12.7   15.02 )-----------( 227      ( 01 Oct 2019 06:28 )             38.2     10-01    140  |  107  |  28<H>  ----------------------------<  146<H>  4.5   |  24  |  0.71    Ca    8.1<L>      01 Oct 2019 06:28    TPro  5.6<L>  /  Alb  2.8<L>  /  TBili  0.3  /  DBili  x   /  AST  16  /  ALT  38  /  AlkPhos  80  09-30      Albumin: Albumin, Serum: 2.8 g/dL (09-30 @ 07:13)      Allergies    No Known Allergies    Intolerances      MEDICATIONS  (STANDING):  ALBUTerol/ipratropium for Nebulization 3 milliLiter(s) Nebulizer every 6 hours  apixaban 5 milliGRAM(s) Oral every 12 hours  aspirin enteric coated 81 milliGRAM(s) Oral daily  atorvastatin 40 milliGRAM(s) Oral at bedtime  buDESOnide 160 MICROgram(s)/formoterol 4.5 MICROgram(s) Inhaler 2 Puff(s) Inhalation two times a day  dextrose 5%. 1000 milliLiter(s) (50 mL/Hr) IV Continuous <Continuous>  dextrose 50% Injectable 12.5 Gram(s) IV Push once  dextrose 50% Injectable 25 Gram(s) IV Push once  dextrose 50% Injectable 25 Gram(s) IV Push once  diltiazem    milliGRAM(s) Oral daily  diltiazem Infusion 5 mG/Hr (5 mL/Hr) IV Continuous <Continuous>  fentaNYL   Patch  12 MICROgram(s)/Hr 1 Patch Transdermal every 72 hours  fentaNYL   Patch  25 MICROgram(s)/Hr 1 Patch Transdermal every 72 hours  gabapentin 300 milliGRAM(s) Oral three times a day  guaiFENesin  milliGRAM(s) Oral every 12 hours  insulin lispro (HumaLOG) corrective regimen sliding scale   SubCutaneous three times a day before meals  losartan 50 milliGRAM(s) Oral daily  methylPREDNISolone sodium succinate Injectable 40 milliGRAM(s) IV Push every 8 hours  senna 2 Tablet(s) Oral at bedtime    MEDICATIONS  (PRN):  ALPRAZolam 0.25 milliGRAM(s) Oral two times a day PRN Anxiety  dextrose 40% Gel 15 Gram(s) Oral once PRN Blood Glucose LESS THAN 70 milliGRAM(s)/deciliter  glucagon  Injectable 1 milliGRAM(s) IntraMuscular once PRN Glucose LESS THAN 70 milligrams/deciliter  morphine  - Injectable 4 milliGRAM(s) IV Push every 4 hours PRN Severe Pain (7 - 10)  morphine  - Injectable 2 milliGRAM(s) IV Push every 4 hours PRN Moderate Pain (4 - 6)  morphine Concentrate 5 milliGRAM(s) Oral every 4 hours PRN pain or dyspnea

## 2019-10-01 NOTE — PROGRESS NOTE ADULT - ASSESSMENT
1. Acute on chronic hypoxic respiratory failure  - due to Copd exacerbation, small cell lung ca  - Taper IV steroids as wheezing is controlled, nebs   - monitor O2 sats - currently 98% on 2LNC   - Patient qualifies for home oxygen, social work to set it up    2. small cell lung ca  - 2nd opinion for hemeonc requested by PMD, patient agrees for treatment.   - Palliative care consult appreciated- [facility] Today  - pain control- fentanyl, morphine    3. afib w/ rvr  - controlled on po cardizem  - eliquis     4. HTN  - Cont. losartan     5. HLD  - Cont. Statin     6. Anxiety  - Cont. xanax PRN BID    7. DVT PPX  - Eliquis     Code status: DNR/DNI- MOLST    Will need home oxygen, rad onc/pulm evaluation   d/w , , patient    Dispo - plan for d/c juan

## 2019-10-01 NOTE — CONSULT NOTE ADULT - ASSESSMENT
1) Acute COPD exacerbation  2) Dyspnea  3) SCLC  4) Abnormal CT Chest    84 y/o female developed worseness of shortness of breath today, did not get better with her usual meds of bronchodilator, associated with palpitation. She normally has shortness of breath. Aggravated by mild exertion, associated with cough, dry mostly, no fever.   In addition, COPD, Left Lung mass (biopsied on 9/16 found to be metastatic small cell carcinoma), Afib, RA previously on immunosuppressant tx, HLD, HTN, recently admitted 9/ for above dx, now admitted 9/25 for c/o worsening of shortness of breath w/ associated with palpitation.  Agree with Duoneb q 6 hrs  Symbicort 2 puffs BID  Solumedrol 40 mg q 8 hrs  Seen by pulmonary 4 years ago    Will continue to monitor   Needs outpatient PFT if/when stable

## 2019-10-01 NOTE — PROGRESS NOTE ADULT - SUBJECTIVE AND OBJECTIVE BOX
HOSPITALIST ATTENDING PROGRESS NOTE    Chart and meds reviewed.  Patient seen and examined.    HPI:   9/30/19 Tolerated chemo yesterday, Pulse ox test documents that she qualifies for home oxygen. d/w , He would like her to have pulm evaluaiton and rad onc evaluation and have a plan for outpatient management prior to going home.   10/1 - pt seen and examined, + cough, + wheezing improving. afebrile, POC discussed, will need O2 going home    All 10 systems reviewed and found to be negative with the exception of what has been described above.  T(C): 36.8 (10-01-19 @ 13:49), Max: 36.8 (10-01-19 @ 13:49)  T(F): 98.2 (10-01-19 @ 13:49), Max: 98.2 (10-01-19 @ 13:49)  HR: 88 (10-01-19 @ 15:32) (73 - 92)  BP: 138/51 (10-01-19 @ 13:49) (119/60 - 138/51)  RR: 18 (10-01-19 @ 13:49) (18 - 20)  SpO2: 97% (10-01-19 @ 13:49) (89% - 97%)  Wt(kg): --  CAPILLARY BLOOD GLUCOSE    CAPILLARY BLOOD GLUCOSE      POCT Blood Glucose.: 136 mg/dL (01 Oct 2019 17:55)  POCT Blood Glucose.: 179 mg/dL (01 Oct 2019 11:25)  POCT Blood Glucose.: 178 mg/dL (01 Oct 2019 08:02)  POCT Blood Glucose.: 220 mg/dL (30 Sep 2019 22:34)    POCT Blood Glucose.: 181 mg/dL (30 Sep 2019 11:58)  POCT Blood Glucose.: 158 mg/dL (30 Sep 2019 08:34)  POCT Blood Glucose.: 181 mg/dL (30 Sep 2019 07:22)  POCT Blood Glucose.: 140 mg/dL (29 Sep 2019 16:49)      PHYSICAL EXAM:    HEENT:  pupils equal and reactive, EOMI, no oropharyngeal lesions, erythema, exudates, oral thrush  NECK:   supple, no carotid bruits, no palpable lymph nodes, no thyromegaly  CV:  +S1, +S2, regular, no murmurs or rubs  RESP:   lungs clear to auscultation bilaterally, +  wheezing, rales,  + rhonchi, good air entry bilaterally  BREAST:  not examined  GI:  abdomen soft, non-tender, non-distended, normal BS, no bruits, no abdominal masses, no palpable masses  RECTAL:  not examined, :  not examined  MSK:   normal muscle tone, no atrophy, no rigidity, no contractions  EXT:  no clubbing, no cyanosis, no edema, no calf pain, swelling or erythema  VASCULAR:  pulses equal and symmetric in the upper and lower extremities  NEURO:  AAOX3, no focal neurological deficits, follows all commands, able to move extremities spontaneously  SKIN:  no ulcers, lesions or rashes    LABS:  10-01    140  |  107  |  28<H>  ----------------------------<  146<H>  4.5   |  24  |  0.71    Ca    8.1<L>      01 Oct 2019 06:28    TPro  5.6<L>  /  Alb  2.8<L>  /  TBili  0.3  /  DBili  x   /  AST  16  /  ALT  38  /  AlkPhos  80  09-30                          12.7   15.02 )-----------( 227      ( 01 Oct 2019 06:28 )             38.2     LIVER FUNCTIONS - ( 30 Sep 2019 07:13 )  Alb: 2.8 g/dL / Pro: 5.6 gm/dL / ALK PHOS: 80 U/L / ALT: 38 U/L / AST: 16 U/L / GGT: x                                 12.5   12.60 )-----------( 226      ( 30 Sep 2019 07:13 )             38.8     09-30    141  |  108  |  28<H>  ----------------------------<  189<H>  4.7   |  26  |  0.77    Ca    8.4<L>      30 Sep 2019 07:13    TPro  5.6<L>  /  Alb  2.8<L>  /  TBili  0.3  /  DBili  x   /  AST  16  /  ALT  38  /  AlkPhos  80  09-30        LIVER FUNCTIONS - ( 30 Sep 2019 07:13 )  Alb: 2.8 g/dL / Pro: 5.6 gm/dL / ALK PHOS: 80 U/L / ALT: 38 U/L / AST: 16 U/L / GGT: x               < from: 12 Lead ECG (09.25.19 @ 13:11) >    Ventricular Rate 134 BPM    Atrial Rate 416 BPM    QRS Duration 64 ms    Q-T Interval 308 ms    QTC Calculation(Bezet) 459 ms    R Axis 79 degrees    T Axis 32 degrees    Diagnosis Line Atrial fibrillation with rapid ventricular response  Septal infarct , age undetermined  Abnormal ECG    < end of copied text >  < from: Xray Chest 1 View-PORTABLE IMMEDIATE (09.25.19 @ 14:19) >  Heart suggest normal size.    Fairly extensive infiltrate imaging of the left hilum proceeding   superiorly again noted. This is consistent with a postobstructive   lymphatic process.    Chest is similar to September 13.    IMPRESSION: Unchanged left upper lobe and hilar findings.      Surgical Pathology Report (09.16.19 @ 15:00)    Surgical Pathology Report:   ACCESSION No:  [id number]    LA NENA SPANGLER                            2        Surgical Final Report          Final Diagnosis    Supraclavicular lymph node, left (needle biopsy and cytologic  preparation):  - Small cell undifferentiated (oat cell) carcinoma.  - See comment.    Verified by: DEIDRA PAZ M.D.  (Electronic Signature)  Reported on: 09/19/19 14:12 EDT, [address] [place] [address]  _________________________________________________________________    Intraoperative Consultation  1. TOUCH PREP FOR IMMEDIATE ADEQUACY ASSESSMENT:  - Adequate sample.  Immediate assessment was performed in  interventional radiology by SKYLAR Rodriguez and reported to Dr. Rodriguez on  9/16/2019.    Comment  Immunohistochemical staining for TTF-1 and synaptophysin are  positive; staining for cytokeratin is noncontributory.  The findings, in conjunction with the histologic features are  consistent with a small cell undifferentiated (oat cell)  carcinoma.    Intradepartmental review.  (4)    This case represents a current or recent malignant diagnosis and  as such we will have the case reported to Tumor Registry.  Please  be reminded that all tumor registry cases must be accurately  staged and tracked.    - For inpatients, please complete the tumor staging form on the  patient's chart based on the clinical data which you have  compiled.    - Please remind your office to respond promptly to the Tumor  Registrar's request for patient follow-up.    Clinical History  Left supraclavicular lymph node bx    Specimen(s) Submitted  1     Left supraclavicular lymph node bx                LA NENA SPANGLER                            2        Surgical Final Report          Gross Description  The specimen is received in formalin and the specimen container  is labeled: Left supraclavicular lymph node biopsy.  It consists  of three tan white soft tissue cores measuring 0.4-0.7 cm in  length and 0.1 cm in diameter.  Entirely submitted.  Two  cassettes. In addition, two slides are prepared intraoperatively.  Fresh tissue is held in RPMI.    In addition to other data that may appear on the specimen  container, the label has been inspected to confirm the presence  of the patient's name and date of birth.  Andria 09/17/2019 07:12    The fresh tissue is entirely submitted for permanent section in  one cassette on 09/18/19 08:54. NV        < end of copied text >    MEDICATIONS  (STANDING):  ALBUTerol/ipratropium for Nebulization 3 milliLiter(s) Nebulizer every 6 hours  apixaban 5 milliGRAM(s) Oral every 12 hours  aspirin enteric coated 81 milliGRAM(s) Oral daily  atorvastatin 40 milliGRAM(s) Oral at bedtime  buDESOnide 160 MICROgram(s)/formoterol 4.5 MICROgram(s) Inhaler 2 Puff(s) Inhalation two times a day  dextrose 5%. 1000 milliLiter(s) (50 mL/Hr) IV Continuous <Continuous>  dextrose 50% Injectable 12.5 Gram(s) IV Push once  dextrose 50% Injectable 25 Gram(s) IV Push once  dextrose 50% Injectable 25 Gram(s) IV Push once  diltiazem    milliGRAM(s) Oral daily  diltiazem Infusion 5 mG/Hr (5 mL/Hr) IV Continuous <Continuous>  fentaNYL   Patch  12 MICROgram(s)/Hr 1 Patch Transdermal every 72 hours  fentaNYL   Patch  25 MICROgram(s)/Hr 1 Patch Transdermal every 72 hours  gabapentin 300 milliGRAM(s) Oral three times a day  guaiFENesin  milliGRAM(s) Oral every 12 hours  insulin lispro (HumaLOG) corrective regimen sliding scale   SubCutaneous three times a day before meals  losartan 50 milliGRAM(s) Oral daily  methylPREDNISolone sodium succinate Injectable 20 milliGRAM(s) IV Push every 8 hours  senna 2 Tablet(s) Oral at bedtime    MEDICATIONS  (PRN):  ALPRAZolam 0.25 milliGRAM(s) Oral two times a day PRN Anxiety  dextrose 40% Gel 15 Gram(s) Oral once PRN Blood Glucose LESS THAN 70 milliGRAM(s)/deciliter  glucagon  Injectable 1 milliGRAM(s) IntraMuscular once PRN Glucose LESS THAN 70 milligrams/deciliter  morphine  - Injectable 4 milliGRAM(s) IV Push every 4 hours PRN Severe Pain (7 - 10)  morphine  - Injectable 2 milliGRAM(s) IV Push every 4 hours PRN Moderate Pain (4 - 6)  morphine Concentrate 5 milliGRAM(s) Oral every 4 hours PRN pain or dyspnea

## 2019-10-01 NOTE — CONSULT NOTE ADULT - SUBJECTIVE AND OBJECTIVE BOX
Patient is a 85y old  Female who presents with a chief complaint of shortness of breath (30 Sep 2019 11:13)      HPI:  84 y/o female with limited stage small cell lung cancer. Seen in my offce by Dr. Bunn several weeks ago for but no tissue diagnosis at that time. Now admitted and began carbo/ 16 after she developed worseness of shortness of breath today, did not get better with her usual meds of bronchodilator, associated with palpitation. She normally has shortness of breath. Aggravated by mild exertion, associated with cough, dry mostly, no fever.   she is chronic active smoker and still was smoking- counselled not to smoke (25 Sep 2019 17:04)      PAST MEDICAL & SURGICAL HISTORY:  Rheumatoid arthritis  Lung mass  HLD (hyperlipidemia)  HTN (hypertension)  COPD (chronic obstructive pulmonary disease)  Carotid stent occlusion, initial encounter  H/O hernia repair  Ectopic thyroid tissue      SOCIAL HISTORY: Lives alone in [facility]. Active smoker      MEDICATIONS  (STANDING):  ALBUTerol/ipratropium for Nebulization 3 milliLiter(s) Nebulizer every 6 hours  apixaban 5 milliGRAM(s) Oral every 12 hours  aspirin enteric coated 81 milliGRAM(s) Oral daily  atorvastatin 40 milliGRAM(s) Oral at bedtime  buDESOnide 160 MICROgram(s)/formoterol 4.5 MICROgram(s) Inhaler 2 Puff(s) Inhalation two times a day  dextrose 5%. 1000 milliLiter(s) (50 mL/Hr) IV Continuous <Continuous>  dextrose 50% Injectable 12.5 Gram(s) IV Push once  dextrose 50% Injectable 25 Gram(s) IV Push once  dextrose 50% Injectable 25 Gram(s) IV Push once  diltiazem    milliGRAM(s) Oral daily  diltiazem Infusion 5 mG/Hr (5 mL/Hr) IV Continuous <Continuous>  fentaNYL   Patch  12 MICROgram(s)/Hr 1 Patch Transdermal every 72 hours  fentaNYL   Patch  25 MICROgram(s)/Hr 1 Patch Transdermal every 72 hours  gabapentin 300 milliGRAM(s) Oral three times a day  guaiFENesin  milliGRAM(s) Oral every 12 hours  insulin lispro (HumaLOG) corrective regimen sliding scale   SubCutaneous three times a day before meals  losartan 50 milliGRAM(s) Oral daily  methylPREDNISolone sodium succinate Injectable 40 milliGRAM(s) IV Push every 8 hours  senna 2 Tablet(s) Oral at bedtime    MEDICATIONS  (PRN):  ALPRAZolam 0.25 milliGRAM(s) Oral two times a day PRN Anxiety  dextrose 40% Gel 15 Gram(s) Oral once PRN Blood Glucose LESS THAN 70 milliGRAM(s)/deciliter  glucagon  Injectable 1 milliGRAM(s) IntraMuscular once PRN Glucose LESS THAN 70 milligrams/deciliter  morphine  - Injectable 4 milliGRAM(s) IV Push every 4 hours PRN Severe Pain (7 - 10)  morphine  - Injectable 2 milliGRAM(s) IV Push every 4 hours PRN Moderate Pain (4 - 6)  morphine Concentrate 5 milliGRAM(s) Oral every 4 hours PRN pain or dyspnea      PHYSICAL EXAM:  Vital Signs Last 24 Hrs  T(C): 36.6 (01 Oct 2019 05:56), Max: 36.8 (30 Sep 2019 11:50)  T(F): 97.8 (01 Oct 2019 05:56), Max: 98.3 (30 Sep 2019 11:50)  HR: 79 (01 Oct 2019 05:56) (78 - 92)  BP: 129/65 (01 Oct 2019 05:56) (119/60 - 150/67)  BP(mean): --  RR: 19 (01 Oct 2019 05:56) (19 - 20)  SpO2: 94% (01 Oct 2019 05:56) (84% - 94%)  Head: no alopecia or scars  Neck: no palpable lymphadenopathy  Lungs: Clear to ascultation bilaterally and no wheezes  Cardiac: normal S1, S2, no murmurs  Abdomen: soft, nontender with no ascites  Extremities: no peripheral edema, good pulses bilaterally  Neuro: A & O, CNs II-XII intact. Motor strength 5/5 throughout and strength 5/5 throughout. Ambulatory    LABS:  CBC Full  -  ( 01 Oct 2019 06:28 )  WBC Count : 15.02 K/uL  RBC Count : 4.01 M/uL  Hemoglobin : 12.7 g/dL  Hematocrit : 38.2 %  Platelet Count - Automated : 227 K/uL  Mean Cell Volume : 95.3 fl  Mean Cell Hemoglobin : 31.7 pg  Mean Cell Hemoglobin Concentration : 33.2 gm/dL  Auto Neutrophil # : x  Auto Lymphocyte # : x  Auto Monocyte # : x  Auto Eosinophil # : x  Auto Basophil # : x  Auto Neutrophil % : x  Auto Lymphocyte % : x  Auto Monocyte % : x  Auto Eosinophil % : x  Auto Basophil % : x    09-30    141  |  108  |  28<H>  ----------------------------<  189<H>  4.7   |  26  |  0.77    Ca    8.4<L>      30 Sep 2019 07:13    TPro  5.6<L>  /  Alb  2.8<L>  /  TBili  0.3  /  DBili  x   /  AST  16  /  ALT  38  /  AlkPhos  80  09-30      RADIOLOGY:  < from: CT Chest No Cont (09.13.19 @ 16:04) >  EXAM:  CT CHEST                            PROCEDURE DATE:  09/13/2019          INTERPRETATION:  CLINICAL INFORMATION: Left upper lobe mass    COMPARISON: None.    PROCEDURE:   CT of the Chest was performed without intravenous contrast.  Sagittal and coronal reformats were performed.      FINDINGS:    LUNGS, AIRWAYS, MEDIASTINUM AND MIGDALIA: Large left suprahilar mass   measuring at least 6.4 x 4.5 cm which demonstrates marked extrinsic   compression on the left upper lobe bronchus and extends into the   mediastinal fat and left upper lobe parenchyma compatible with   malignancy. Left apical nodular mass measuring 1.6 cm. Additional patchy   opacities in the left upper lobe with interlobular septal thickening may   represent a combination of metastatic spread of disease pulmonary venous   congestion and/or postobstructive pneumonia. Additional mediastinal and   hilar adenopathy. Severe emphysema. No pneumothorax. Right medial basilar   subsegmental atelectasis. Right apical pleural parenchymal scarring.    PLEURA: Small left pleural effusion.    VESSELS: Atherosclerotic changes of the aorta and coronary vasculature.    HEART: Heart size is normal. No pericardial effusion.    CHEST WALL AND LOWER NECK: Concern for left supraclavicular adenopathy.   The left apical nodular density extends into the medial left apical   pleural fat.    VISUALIZED UPPER ABDOMEN: Hepatomegaly.    BONES: Degenerative changes.    IMPRESSION:     Left apical nodular opacity with a conglomerate left hilar mass   compatible with malignancy till proven otherwise. Extrinsic narrowing of   the left upper lobe bronchus with additional patchy nodularity and   interlobular septal thickening in the left upper lobe may represent   lymphangitic spread disease and/or pulmonary venous   congestion/postobstructive pneumonia. This mass extends into the   pericardial and mediastinal fat.    Concern for left supraclavicular adenopathy.                    MARISOL QUEZADA M.D., ATTENDING RADIOLOGIST  This document has been electronically signed. Sep 13 2019  4:26PM              < end of copied text >

## 2019-10-01 NOTE — PROGRESS NOTE ADULT - ASSESSMENT
85y old Female coming from home with hx of COPD, Left Lung mass (biopsied on 9/16 found to be metastatic small cell carcinoma), Afib, RA previously on immunosuppressant tx, HLD, HTN, recently admitted 9/ for above dx, now admitted 9/25 for c/o worsening of shortness of breath w/ associated with palpitation. Found here to have COPD exacerbation c/b rapid afib (the latter resolved). Palliative Care consulted to assist with establishing GOC, as pt noted to be refusing cancer treatment.     1) Pain  - likely neuropathic pain from underlying metastatic disease with ayse involvement  - c/w gabapentin (already at a reasonable dose)  - c/w IV morphine 2mg/4mg q4h prn  - c/w roxanol 5mg q4h prn for longer-lasting relief (changed back to concentrate, which was changed by pharmacy, so we can include dyspnea as indication for tx)  - c/w fentanyl 37mcg    2) Dyspnea  - multifactorial- likely due to COPD exacerbation atop new lung cancer  - c/w nebs and bronchodilators  - c/w supplemental O2  - discussed the usefulness of opioids for air hunger with pt as well, may use morphine for this as well  - pulm consult appreciated    3) Anxiety  - c/w xanax 0.25 mg, can liberalize to q8h prn    4) Rapid afib  - cardiology notes appreciated  - pt back in sinus rhythm with some paroxysmal afib  - no ACS signs as per cardio    5) Metastatic small cell ca  - reviewed onc impression from last admission  - was considering palliative chemo (Dr. Campbell) and RT (Dr. Raya)  - however pt refused this upon followup with Dr. Campbell 9/26  - as per pt, Dr. Juarez suggested second opinion which she agreed with. Pt open to speaking with suggested clinician Dr. Tay, which took place over weekend  - pt agreed to initiate palliative chemo 9/29 and so far is tolerating this well  - rad onc consult appreciated    6) Debility  - quite independent before this, but admits to walking with walker and her family being concerned about fall  - PPS 40-50% now due to sx  - evidence of malnutrition  - recommend nutrition/dietary consult  - also recommend PT since pt awaiting her walker at home and has concern about falls (suggest small dose of morphine beforehand due to DUBOIS)    7) GOC/Advanced Directives  - pt has capacity for decision making, is clear about her feelings, and reasonable enough to consider other opinions  - HCP on file naming her sister Lety Cardenas [phone]  - MOLST revised 9/30: DNR, limited medical interventions, DNI, send to hospital, no feeding tube, trial of IVF, determine use of abx  - GOC meeting held with pt and sister 9/30- plan to c/w cancer treatment and return home when deemed medically stable with home palliative care services ([facility]). *See [facility] note for further details.    Thank you for including us in Ms. Juarez's care. Will continue to follow with you.    Douglas Mcclure MD  Palliative Care Attending

## 2019-10-01 NOTE — CONSULT NOTE ADULT - ASSESSMENT
84 yo F with limited stage small cell lung cancer (although possible left sclav LAD).  Tolerated chemotherapy fairly well. Discussed option of adding thoracic xrt as protocol for limited stage small cell lung cancer.  Discussed logistics and potential toxiciities. Pt considering her options and concerned about daily transportation.

## 2019-10-01 NOTE — PROGRESS NOTE ADULT - ASSESSMENT
Limited small cell lung cancer  Left upper lobe mass, bronchial compression  Status post carboplatin, etoposide  COPD    Plan    Monitor blood counts  Will start on [facility]  Radiation oncology consult . May get radiation as outpatient  Next chemotherapy in 2 weeks  Optimize primary function  O2  Bronchodilators

## 2019-10-01 NOTE — CONSULT NOTE ADULT - SUBJECTIVE AND OBJECTIVE BOX
Patient is a 85y old  Female who presents with a chief complaint of shortness of breath (01 Oct 2019 12:39)      HPI:  86 y/o female developed worseness of shortness of breath today, did not get better with her usual meds of bronchodilator, associated with palpitation. She normally has shortness of breath. Aggravated by mild exertion, associated with cough, dry mostly, no fever.   she is chronic active smoker and still was smoking- counselled not to smoke (25 Sep 2019 17:04)      PAST MEDICAL & SURGICAL HISTORY:  Rheumatoid arthritis  Lung mass  HLD (hyperlipidemia)  HTN (hypertension)  COPD (chronic obstructive pulmonary disease)  Carotid stent occlusion, initial encounter  H/O hernia repair  Ectopic thyroid tissue      PREVIOUS DIAGNOSTIC TESTING:      MEDICATIONS  (STANDING):  ALBUTerol/ipratropium for Nebulization 3 milliLiter(s) Nebulizer every 6 hours  apixaban 5 milliGRAM(s) Oral every 12 hours  aspirin enteric coated 81 milliGRAM(s) Oral daily  atorvastatin 40 milliGRAM(s) Oral at bedtime  buDESOnide 160 MICROgram(s)/formoterol 4.5 MICROgram(s) Inhaler 2 Puff(s) Inhalation two times a day  dextrose 5%. 1000 milliLiter(s) (50 mL/Hr) IV Continuous <Continuous>  dextrose 50% Injectable 12.5 Gram(s) IV Push once  dextrose 50% Injectable 25 Gram(s) IV Push once  dextrose 50% Injectable 25 Gram(s) IV Push once  diltiazem    milliGRAM(s) Oral daily  diltiazem Infusion 5 mG/Hr (5 mL/Hr) IV Continuous <Continuous>  fentaNYL   Patch  12 MICROgram(s)/Hr 1 Patch Transdermal every 72 hours  fentaNYL   Patch  25 MICROgram(s)/Hr 1 Patch Transdermal every 72 hours  gabapentin 300 milliGRAM(s) Oral three times a day  guaiFENesin  milliGRAM(s) Oral every 12 hours  insulin lispro (HumaLOG) corrective regimen sliding scale   SubCutaneous three times a day before meals  losartan 50 milliGRAM(s) Oral daily  methylPREDNISolone sodium succinate Injectable 40 milliGRAM(s) IV Push every 8 hours  senna 2 Tablet(s) Oral at bedtime    MEDICATIONS  (PRN):  ALPRAZolam 0.25 milliGRAM(s) Oral two times a day PRN Anxiety  dextrose 40% Gel 15 Gram(s) Oral once PRN Blood Glucose LESS THAN 70 milliGRAM(s)/deciliter  glucagon  Injectable 1 milliGRAM(s) IntraMuscular once PRN Glucose LESS THAN 70 milligrams/deciliter  morphine  - Injectable 4 milliGRAM(s) IV Push every 4 hours PRN Severe Pain (7 - 10)  morphine  - Injectable 2 milliGRAM(s) IV Push every 4 hours PRN Moderate Pain (4 - 6)  morphine Concentrate 5 milliGRAM(s) Oral every 4 hours PRN pain or dyspnea      FAMILY HISTORY:      SOCIAL HISTORY:  ***    REVIEW OF SYSTEM:  dyspnea, cough, otherwise 12 point ROS negative     Vital Signs Last 24 Hrs  T(C): 36.6 (01 Oct 2019 05:56), Max: 36.6 (30 Sep 2019 20:19)  T(F): 97.8 (01 Oct 2019 05:56), Max: 97.8 (30 Sep 2019 20:19)  HR: 82 (01 Oct 2019 09:31) (78 - 92)  BP: 129/65 (01 Oct 2019 05:56) (119/60 - 129/65)  BP(mean): --  RR: 19 (01 Oct 2019 05:56) (19 - 20)  SpO2: 96% (01 Oct 2019 09:31) (89% - 96%)    I&O's Summary    PHYSICAL EXAM  General Appearance: cooperative, no acute distress,   HEENT: PERRL, conjunctiva clear, EOM's intact, non injected pharynx, no exudate, TM   normal  Neck: Supple, , no adenopathy, thyroid: not enlarged, no carotid bruit or JVD  Back: Symmetric, no  tenderness,no soft tissue tenderness  Lungs: Diminished bilaterally   Heart: Regular rate and rhythm, S1, S2 normal, no murmur, rub or gallop  Abdomen: Soft, non-tender, bowel sounds active , no hepatosplenomegaly  Extremities: no cyanosis or edema, no joint swelling  Skin: Skin color, texture normal, no rashes   Neurologic: Alert and oriented X3 , cranial nerves intact, sensory and motor normal,    ECG:    LABS:                          12.7   15.02 )-----------( 227      ( 01 Oct 2019 06:28 )             38.2     10-01    140  |  107  |  28<H>  ----------------------------<  146<H>  4.5   |  24  |  0.71    Ca    8.1<L>      01 Oct 2019 06:28    TPro  5.6<L>  /  Alb  2.8<L>  /  TBili  0.3  /  DBili  x   /  AST  16  /  ALT  38  /  AlkPhos  80  09-30                      RADIOLOGY & ADDITIONAL STUDIES:

## 2019-10-01 NOTE — PROGRESS NOTE ADULT - SUBJECTIVE AND OBJECTIVE BOX
HPI:  86 y/o female With  limited small cell lung cancer and severe COPD. She recently received carboplatin and etoposide. Tolerated this well without complication. As an outpatient had an extensive workup which was negative for metastatic disease. Admitted for fevers, increasing dyspnea. Currently clinically stable but has significant dyspnea on exertion. Tolerated the chemotherapy without any nausea, emesis.    PAST MEDICAL & SURGICAL HISTORY:  Rheumatoid arthritis  Lung mass  HLD (hyperlipidemia)  HTN (hypertension)  COPD (chronic obstructive pulmonary disease)  Carotid stent occlusion, initial encounter  H/O hernia repair  Ectopic thyroid tissue      MEDICATIONS  (STANDING):  ALBUTerol/ipratropium for Nebulization 3 milliLiter(s) Nebulizer every 6 hours  apixaban 5 milliGRAM(s) Oral every 12 hours  aspirin enteric coated 81 milliGRAM(s) Oral daily  atorvastatin 40 milliGRAM(s) Oral at bedtime  buDESOnide 160 MICROgram(s)/formoterol 4.5 MICROgram(s) Inhaler 2 Puff(s) Inhalation two times a day  dextrose 5%. 1000 milliLiter(s) (50 mL/Hr) IV Continuous <Continuous>  dextrose 50% Injectable 12.5 Gram(s) IV Push once  dextrose 50% Injectable 25 Gram(s) IV Push once  dextrose 50% Injectable 25 Gram(s) IV Push once  diltiazem    milliGRAM(s) Oral daily  diltiazem Infusion 5 mG/Hr (5 mL/Hr) IV Continuous <Continuous>  fentaNYL   Patch  12 MICROgram(s)/Hr 1 Patch Transdermal every 72 hours  fentaNYL   Patch  25 MICROgram(s)/Hr 1 Patch Transdermal every 72 hours  gabapentin 300 milliGRAM(s) Oral three times a day  guaiFENesin  milliGRAM(s) Oral every 12 hours  insulin lispro (HumaLOG) corrective regimen sliding scale   SubCutaneous three times a day before meals  losartan 50 milliGRAM(s) Oral daily  methylPREDNISolone sodium succinate Injectable 20 milliGRAM(s) IV Push every 8 hours  senna 2 Tablet(s) Oral at bedtime    MEDICATIONS  (PRN):  ALPRAZolam 0.25 milliGRAM(s) Oral two times a day PRN Anxiety  dextrose 40% Gel 15 Gram(s) Oral once PRN Blood Glucose LESS THAN 70 milliGRAM(s)/deciliter  glucagon  Injectable 1 milliGRAM(s) IntraMuscular once PRN Glucose LESS THAN 70 milligrams/deciliter  morphine  - Injectable 4 milliGRAM(s) IV Push every 4 hours PRN Severe Pain (7 - 10)  morphine  - Injectable 2 milliGRAM(s) IV Push every 4 hours PRN Moderate Pain (4 - 6)  morphine Concentrate 5 milliGRAM(s) Oral every 4 hours PRN pain or dyspnea      Allergies    No Known Allergies    Intolerances        SOCIAL HISTORY:    FAMILY HISTORY:      Vital Signs Last 24 Hrs  T(C): 36.8 (01 Oct 2019 13:49), Max: 36.8 (01 Oct 2019 13:49)  T(F): 98.2 (01 Oct 2019 13:49), Max: 98.2 (01 Oct 2019 13:49)  HR: 88 (01 Oct 2019 15:32) (73 - 92)  BP: 138/51 (01 Oct 2019 13:49) (119/60 - 138/51)  BP(mean): --  RR: 18 (01 Oct 2019 13:49) (18 - 20)  SpO2: 97% (01 Oct 2019 13:49) (89% - 97%)      LABS:                        12.7   15.02 )-----------( 227      ( 01 Oct 2019 06:28 )             38.2     10-01    140  |  107  |  28<H>  ----------------------------<  146<H>  4.5   |  24  |  0.71    Ca    8.1<L>      01 Oct 2019 06:28    TPro  5.6<L>  /  Alb  2.8<L>  /  TBili  0.3  /  DBili  x   /  AST  16  /  ALT  38  /  AlkPhos  80  09-30          RADIOLOGY & ADDITIONAL STUDIES:

## 2019-10-01 NOTE — CONSULT NOTE ADULT - CONSULT REQUESTED DATE/TIME
01-Oct-2019 09:24
26-Sep-2019
28-Sep-2019 12:43
28-Sep-2019 20:40
01-Oct-2019 07:31
27-Sep-2019 07:52

## 2019-10-02 ENCOUNTER — TRANSCRIPTION ENCOUNTER (OUTPATIENT)
Age: 84
End: 2019-10-02

## 2019-10-02 VITALS — DIASTOLIC BLOOD PRESSURE: 48 MMHG | SYSTOLIC BLOOD PRESSURE: 121 MMHG | HEART RATE: 44 BPM | TEMPERATURE: 98 F

## 2019-10-02 RX ORDER — DIPHENHYDRAMINE HCL 50 MG
2 CAPSULE ORAL
Qty: 0 | Refills: 0 | DISCHARGE

## 2019-10-02 RX ORDER — GOLIMUMAB 50 MG/4ML
0 SOLUTION INTRAVENOUS
Qty: 0 | Refills: 0 | DISCHARGE

## 2019-10-02 RX ORDER — PANTOPRAZOLE SODIUM 20 MG/1
1 TABLET, DELAYED RELEASE ORAL
Qty: 30 | Refills: 0
Start: 2019-10-02 | End: 2019-10-31

## 2019-10-02 RX ORDER — FENTANYL CITRATE 50 UG/ML
1 INJECTION INTRAVENOUS
Qty: 10 | Refills: 0
Start: 2019-10-02 | End: 2019-10-11

## 2019-10-02 RX ORDER — LANOLIN ALCOHOL/MO/W.PET/CERES
3 CREAM (GRAM) TOPICAL ONCE
Refills: 0 | Status: DISCONTINUED | OUTPATIENT
Start: 2019-10-02 | End: 2019-10-02

## 2019-10-02 RX ORDER — FILGRASTIM 480MCG/1.6
300 VIAL (ML) INJECTION DAILY
Refills: 0 | Status: DISCONTINUED | OUTPATIENT
Start: 2019-10-02 | End: 2019-10-02

## 2019-10-02 RX ADMIN — Medication 20 MILLIGRAM(S): at 13:43

## 2019-10-02 RX ADMIN — Medication 120 MILLIGRAM(S): at 06:05

## 2019-10-02 RX ADMIN — Medication 3 MILLILITER(S): at 08:01

## 2019-10-02 RX ADMIN — Medication 2: at 13:43

## 2019-10-02 RX ADMIN — Medication 3 MILLILITER(S): at 01:52

## 2019-10-02 RX ADMIN — BUDESONIDE AND FORMOTEROL FUMARATE DIHYDRATE 2 PUFF(S): 160; 4.5 AEROSOL RESPIRATORY (INHALATION) at 08:02

## 2019-10-02 RX ADMIN — FENTANYL CITRATE 1 PATCH: 50 INJECTION INTRAVENOUS at 06:05

## 2019-10-02 RX ADMIN — Medication 81 MILLIGRAM(S): at 12:14

## 2019-10-02 RX ADMIN — APIXABAN 5 MILLIGRAM(S): 2.5 TABLET, FILM COATED ORAL at 06:05

## 2019-10-02 RX ADMIN — Medication 0.25 MILLIGRAM(S): at 00:33

## 2019-10-02 RX ADMIN — Medication 300 MICROGRAM(S): at 12:14

## 2019-10-02 RX ADMIN — GABAPENTIN 300 MILLIGRAM(S): 400 CAPSULE ORAL at 13:43

## 2019-10-02 RX ADMIN — GABAPENTIN 300 MILLIGRAM(S): 400 CAPSULE ORAL at 06:04

## 2019-10-02 RX ADMIN — Medication 20 MILLIGRAM(S): at 06:04

## 2019-10-02 RX ADMIN — Medication 600 MILLIGRAM(S): at 06:04

## 2019-10-02 RX ADMIN — LOSARTAN POTASSIUM 50 MILLIGRAM(S): 100 TABLET, FILM COATED ORAL at 06:04

## 2019-10-02 NOTE — DISCHARGE NOTE PROVIDER - HOSPITAL COURSE
Chart and meds reviewed.  Patient seen and examined.        HPI:     9/30/19 Tolerated chemo yesterday, Pulse ox test documents that she qualifies for home oxygen. d/w , He would like her to have pulm evaluaiton and rad onc evaluation and have a plan for outpatient management prior to going home.     10/1 - pt seen and examined, + cough, + wheezing improving. afebrile, POC discussed, will need O2 going home    10/2 - pt seen and examined, cough and dyspnea improving, afebrile, tolerates po diet, ambulating     All 10 systems reviewed and found to be negative with the exception of what has been described above.    T(C): 36.5 (10-02-19 @ 05:50), Max: 36.7 (10-01-19 @ 20:45)    T(F): 97.7 (10-02-19 @ 05:50), Max: 98.1 (10-01-19 @ 20:45)    HR: 83 (10-02-19 @ 08:32) (54 - 88)    BP: 157/63 (10-02-19 @ 05:50) (120/62 - 157/63)    RR: 18 (10-02-19 @ 05:50) (18 - 18)    SpO2: 100% (10-02-19 @ 05:50) (97% - 100%)    Wt(kg): --    HEENT:  pupils equal and reactive, EOMI, no oropharyngeal lesions, erythema, exudates, oral thrush    NECK:   supple, no carotid bruits, no palpable lymph nodes, no thyromegaly    CV:  +S1, +S2, regular, no murmurs or rubs    RESP:   lungs clear to auscultation bilaterally, +  wheezing, rales,  + rhonchi, good air entry bilaterally    BREAST:  not examined    GI:  abdomen soft, non-tender, non-distended, normal BS, no bruits, no abdominal masses, no palpable masses    RECTAL:  not examined, :  not examined    MSK:   normal muscle tone, no atrophy, no rigidity, no contractions    EXT:  no clubbing, no cyanosis, no edema, no calf pain, swelling or erythema    VASCULAR:  pulses equal and symmetric in the upper and lower extremities    NEURO:  AAOX3, no focal neurological deficits, follows all commands, able to move extremities spontaneously    SKIN:  no ulcers, lesions or rashes        HPI:    84 y/o female developed worseness of shortness of breath on 9/25/19 did not get better with her usual meds of bronchodilator, associated with palpitation. She normally has shortness of breath. Aggravated by mild exertion, associated with cough, dry mostly, no fever.     she is chronic active smoker and still was smoking- counselled not to smoke     · Assessment        1. Acute on chronic hypoxic respiratory failure     - due to Copd exacerbation, small cell lung ca     - Taper IV steroids as wheezing is controlled, nebs     - monitor O2 sats - currently 98% on 2LNC     - Patient qualifies for home oxygen, social work to set it up        2. small cell lung ca    - 2nd opinion for hemeonc requested by PMD, patient agrees for treatment.     - Palliative care consult appreciated     - pain control- fentanyl, morphine        3. afib w/ rvr    - controlled on po cardizem    - eliquis         4. HTN    - Cont. losartan         5. HLD    - Cont. Statin         6. Anxiety    - Cont. xanax PRN BID        Code status: DNR/DNI- MOLST         home oxygen, rad onc/pulm  as o/p close follow up ,     Disposition - medically optimized to be discharged home with close follow up with PCP , pulmonary and  oncology wihtin 1 week     return to ED if fever, abdominal pain, nausea, vomiting, chest pain, dyspnea    Discharge plan discussed with patient, RN    Patient advised to follow up with PCP within 3-7 days    time spend 40 min    Discharge note faxed to PCP with my contact information to call me back

## 2019-10-02 NOTE — PROGRESS NOTE ADULT - SUBJECTIVE AND OBJECTIVE BOX
INTERVAL HISTORY:    Limited small call cancer  S/P chemotherapy/ well tolerated  COPD / on 02      REVIEW OF SYSTEMS:    Feeling better  wants to go home        Allergies    No Known Allergies    Intolerances        MEDICATIONS  (STANDING):  ALBUTerol/ipratropium for Nebulization 3 milliLiter(s) Nebulizer every 6 hours  apixaban 5 milliGRAM(s) Oral every 12 hours  aspirin enteric coated 81 milliGRAM(s) Oral daily  atorvastatin 40 milliGRAM(s) Oral at bedtime  buDESOnide 160 MICROgram(s)/formoterol 4.5 MICROgram(s) Inhaler 2 Puff(s) Inhalation two times a day  dextrose 5%. 1000 milliLiter(s) (50 mL/Hr) IV Continuous <Continuous>  dextrose 50% Injectable 12.5 Gram(s) IV Push once  dextrose 50% Injectable 25 Gram(s) IV Push once  dextrose 50% Injectable 25 Gram(s) IV Push once  diltiazem    milliGRAM(s) Oral daily  diltiazem Infusion 5 mG/Hr (5 mL/Hr) IV Continuous <Continuous>  fentaNYL   Patch  12 MICROgram(s)/Hr 1 Patch Transdermal every 72 hours  fentaNYL   Patch  25 MICROgram(s)/Hr 1 Patch Transdermal every 72 hours  filgrastim-sndz Injectable 300 MICROGram(s) SubCutaneous daily  gabapentin 300 milliGRAM(s) Oral three times a day  guaiFENesin  milliGRAM(s) Oral every 12 hours  insulin lispro (HumaLOG) corrective regimen sliding scale   SubCutaneous three times a day before meals  losartan 50 milliGRAM(s) Oral daily  melatonin 3 milliGRAM(s) Oral once  methylPREDNISolone sodium succinate Injectable 20 milliGRAM(s) IV Push every 8 hours  senna 2 Tablet(s) Oral at bedtime    MEDICATIONS  (PRN):  ALPRAZolam 0.25 milliGRAM(s) Oral two times a day PRN Anxiety  dextrose 40% Gel 15 Gram(s) Oral once PRN Blood Glucose LESS THAN 70 milliGRAM(s)/deciliter  glucagon  Injectable 1 milliGRAM(s) IntraMuscular once PRN Glucose LESS THAN 70 milligrams/deciliter  morphine  - Injectable 4 milliGRAM(s) IV Push every 4 hours PRN Severe Pain (7 - 10)  morphine  - Injectable 2 milliGRAM(s) IV Push every 4 hours PRN Moderate Pain (4 - 6)  morphine Concentrate 5 milliGRAM(s) Oral every 4 hours PRN pain or dyspnea      Vital Signs Last 24 Hrs  T(C): 36.5 (02 Oct 2019 05:50), Max: 36.8 (01 Oct 2019 13:49)  T(F): 97.7 (02 Oct 2019 05:50), Max: 98.2 (01 Oct 2019 13:49)  HR: 83 (02 Oct 2019 08:32) (54 - 88)  BP: 157/63 (02 Oct 2019 05:50) (120/62 - 157/63)  BP(mean): --  RR: 18 (02 Oct 2019 05:50) (18 - 18)  SpO2: 100% (02 Oct 2019 05:50) (96% - 100%)    PHYSICAL EXAM:    GENERAL: NAD,   HEAD:  Atraumatic, Normocephalic  EYES: EOMI, PERRLA, conjunctiva and sclera clear    NECK: Supple, No JVD, Normal thyroid  NERVOUS SYSTEM:    CHEST/LUNG: Clear to percussion bilaterally; No rales, rhonchi,   HEART: Regular rate and rhythm;   ABDOMEN: Soft, Nontender.  EXTREMITIES:   edema:-  LYMPH: No lymphadenopathy noted        LABS:                        12.7   15.02 )-----------( 227      ( 01 Oct 2019 06:28 )             38.2     10-01    140  |  107  |  28<H>  ----------------------------<  146<H>  4.5   |  24  |  0.71    Ca    8.1<L>      01 Oct 2019 06:28      RADIOLOGY & ADDITIONAL STUDIES:

## 2019-10-02 NOTE — DISCHARGE NOTE PROVIDER - NSDCFUADDINST_GEN_ALL_CORE_FT
Fairly extensive infiltrate imaging of the left hilum proceeding   superiorly again noted. This is consistent with a postobstructive   lymphatic process.    Chest is similar to September 13.    IMPRESSION: Unchanged left upper lobe and hilar findings.    < end of copied text >

## 2019-10-02 NOTE — PROGRESS NOTE ADULT - REASON FOR ADMISSION
shortness of breath
shortness of breath  Small cell lung cancer

## 2019-10-02 NOTE — DISCHARGE NOTE PROVIDER - NSDCFUSCHEDAPPT_GEN_ALL_CORE_FT
LA NENA SPANGLER ; 11/15/2019 ; [facility]

## 2019-10-02 NOTE — PROGRESS NOTE ADULT - ASSESSMENT
Small cell cancer  S/P Carboplatin and Etoposide/ attenuated  COPD    A/P    Neupogen 3-5 days in anticipation WBC will ricardo in a week  Out patient FU for ongoing management  Pulm evaluation/ O2 etc  Will discuss RT as out patient

## 2019-10-02 NOTE — DISCHARGE NOTE PROVIDER - NSDCCPCAREPLAN_GEN_ALL_CORE_FT
PRINCIPAL DISCHARGE DIAGNOSIS  Diagnosis: COPD (chronic obstructive pulmonary disease)  Assessment and Plan of Treatment: complete steroid taper, follow up with Pulmonologist within 1week      SECONDARY DISCHARGE DIAGNOSES  Diagnosis: Cancer of lung  Assessment and Plan of Treatment: follow up with oncologist Dr. Tay within 1 week for further work-up and  management    Diagnosis: Atrial fibrillation  Assessment and Plan of Treatment:

## 2019-10-02 NOTE — DISCHARGE NOTE NURSING/CASE MANAGEMENT/SOCIAL WORK - PATIENT PORTAL LINK FT
You can access the FollowMyHealth Patient Portal offered by [facility] by registering at the following website: [email or web address]. By joining [facility]’s FollowMyHealth portal, you will also be able to view your health information using other applications (apps) compatible with our system.

## 2019-10-02 NOTE — DISCHARGE NOTE PROVIDER - PROVIDER TOKENS
PROVIDER:[TOKEN:[[id number]]],PROVIDER:[TOKEN:[[id number]]],PROVIDER:[TOKEN:[[id number]]]

## 2019-10-03 ENCOUNTER — INPATIENT (INPATIENT)
Facility: HOSPITAL | Age: 84
LOS: 4 days | Discharge: HOME HEALTH | DRG: 947 | End: 2019-10-08
Attending: INTERNAL MEDICINE | Admitting: INTERNAL MEDICINE
Payer: MEDICARE

## 2019-10-03 VITALS
HEART RATE: 99 BPM | DIASTOLIC BLOOD PRESSURE: 79 MMHG | OXYGEN SATURATION: 96 % | TEMPERATURE: 97 F | RESPIRATION RATE: 22 BRPM | SYSTOLIC BLOOD PRESSURE: 151 MMHG

## 2019-10-03 DIAGNOSIS — R10.9 UNSPECIFIED ABDOMINAL PAIN: ICD-10-CM

## 2019-10-03 DIAGNOSIS — T82.898A OTHER SPECIFIED COMPLICATION OF VASCULAR PROSTHETIC DEVICES, IMPLANTS AND GRAFTS, INITIAL ENCOUNTER: Chronic | ICD-10-CM

## 2019-10-03 DIAGNOSIS — Q89.2 CONGENITAL MALFORMATIONS OF OTHER ENDOCRINE GLANDS: Chronic | ICD-10-CM

## 2019-10-03 DIAGNOSIS — Z98.890 OTHER SPECIFIED POSTPROCEDURAL STATES: Chronic | ICD-10-CM

## 2019-10-03 LAB
ADD ON TEST-SPECIMEN IN LAB: SIGNIFICANT CHANGE UP
ALBUMIN SERPL ELPH-MCNC: 3 G/DL — LOW (ref 3.3–5)
ALP SERPL-CCNC: 79 U/L — SIGNIFICANT CHANGE UP (ref 40–120)
ALT FLD-CCNC: 42 U/L — SIGNIFICANT CHANGE UP (ref 12–78)
ANION GAP SERPL CALC-SCNC: 7 MMOL/L — SIGNIFICANT CHANGE UP (ref 5–17)
APPEARANCE UR: CLEAR — SIGNIFICANT CHANGE UP
AST SERPL-CCNC: 25 U/L — SIGNIFICANT CHANGE UP (ref 15–37)
BASOPHILS # BLD AUTO: 0 K/UL — SIGNIFICANT CHANGE UP (ref 0–0.2)
BASOPHILS NFR BLD AUTO: 0 % — SIGNIFICANT CHANGE UP (ref 0–2)
BILIRUB SERPL-MCNC: 0.6 MG/DL — SIGNIFICANT CHANGE UP (ref 0.2–1.2)
BILIRUB UR-MCNC: NEGATIVE — SIGNIFICANT CHANGE UP
BUN SERPL-MCNC: 24 MG/DL — HIGH (ref 7–23)
CALCIUM SERPL-MCNC: 8.4 MG/DL — LOW (ref 8.5–10.1)
CHLORIDE SERPL-SCNC: 104 MMOL/L — SIGNIFICANT CHANGE UP (ref 96–108)
CO2 SERPL-SCNC: 28 MMOL/L — SIGNIFICANT CHANGE UP (ref 22–31)
COLOR SPEC: YELLOW — SIGNIFICANT CHANGE UP
CREAT SERPL-MCNC: 0.61 MG/DL — SIGNIFICANT CHANGE UP (ref 0.5–1.3)
DIFF PNL FLD: NEGATIVE — SIGNIFICANT CHANGE UP
EOSINOPHIL # BLD AUTO: 0 K/UL — SIGNIFICANT CHANGE UP (ref 0–0.5)
EOSINOPHIL NFR BLD AUTO: 0 % — SIGNIFICANT CHANGE UP (ref 0–6)
GLUCOSE SERPL-MCNC: 131 MG/DL — HIGH (ref 70–99)
GLUCOSE UR QL: NEGATIVE MG/DL — SIGNIFICANT CHANGE UP
HCT VFR BLD CALC: 43.1 % — SIGNIFICANT CHANGE UP (ref 34.5–45)
HGB BLD-MCNC: 14.4 G/DL — SIGNIFICANT CHANGE UP (ref 11.5–15.5)
KETONES UR-MCNC: NEGATIVE — SIGNIFICANT CHANGE UP
LACTATE SERPL-SCNC: 1.3 MMOL/L — SIGNIFICANT CHANGE UP (ref 0.7–2)
LEUKOCYTE ESTERASE UR-ACNC: NEGATIVE — SIGNIFICANT CHANGE UP
LIDOCAIN IGE QN: 117 U/L — SIGNIFICANT CHANGE UP (ref 73–393)
LYMPHOCYTES # BLD AUTO: 1.22 K/UL — SIGNIFICANT CHANGE UP (ref 1–3.3)
LYMPHOCYTES # BLD AUTO: 2 % — LOW (ref 13–44)
MCHC RBC-ENTMCNC: 31.7 PG — SIGNIFICANT CHANGE UP (ref 27–34)
MCHC RBC-ENTMCNC: 33.4 GM/DL — SIGNIFICANT CHANGE UP (ref 32–36)
MCV RBC AUTO: 94.9 FL — SIGNIFICANT CHANGE UP (ref 80–100)
MONOCYTES # BLD AUTO: 1.83 K/UL — HIGH (ref 0–0.9)
MONOCYTES NFR BLD AUTO: 3 % — SIGNIFICANT CHANGE UP (ref 2–14)
NEUTROPHILS # BLD AUTO: 57.86 K/UL — HIGH (ref 1.8–7.4)
NEUTROPHILS NFR BLD AUTO: 86 % — HIGH (ref 43–77)
NITRITE UR-MCNC: NEGATIVE — SIGNIFICANT CHANGE UP
NRBC # BLD: SIGNIFICANT CHANGE UP /100 WBCS (ref 0–0)
PH UR: 7 — SIGNIFICANT CHANGE UP (ref 5–8)
PLATELET # BLD AUTO: 248 K/UL — SIGNIFICANT CHANGE UP (ref 150–400)
POTASSIUM SERPL-MCNC: 4.3 MMOL/L — SIGNIFICANT CHANGE UP (ref 3.5–5.3)
POTASSIUM SERPL-SCNC: 4.3 MMOL/L — SIGNIFICANT CHANGE UP (ref 3.5–5.3)
PROT SERPL-MCNC: 6 GM/DL — SIGNIFICANT CHANGE UP (ref 6–8.3)
PROT UR-MCNC: NEGATIVE MG/DL — SIGNIFICANT CHANGE UP
RBC # BLD: 4.54 M/UL — SIGNIFICANT CHANGE UP (ref 3.8–5.2)
RBC # FLD: 13.7 % — SIGNIFICANT CHANGE UP (ref 10.3–14.5)
SODIUM SERPL-SCNC: 139 MMOL/L — SIGNIFICANT CHANGE UP (ref 135–145)
SP GR SPEC: 1.01 — SIGNIFICANT CHANGE UP (ref 1.01–1.02)
TROPONIN I SERPL-MCNC: <0.015 NG/ML — SIGNIFICANT CHANGE UP (ref 0.01–0.04)
UROBILINOGEN FLD QL: NEGATIVE MG/DL — SIGNIFICANT CHANGE UP
WBC # BLD: 60.9 K/UL — CRITICAL HIGH (ref 3.8–10.5)
WBC # FLD AUTO: 60.9 K/UL — CRITICAL HIGH (ref 3.8–10.5)

## 2019-10-03 PROCEDURE — 82150 ASSAY OF AMYLASE: CPT

## 2019-10-03 PROCEDURE — 97116 GAIT TRAINING THERAPY: CPT | Mod: GP

## 2019-10-03 PROCEDURE — 82945 GLUCOSE OTHER FLUID: CPT

## 2019-10-03 PROCEDURE — 87015 SPECIMEN INFECT AGNT CONCNTJ: CPT

## 2019-10-03 PROCEDURE — 80061 LIPID PANEL: CPT

## 2019-10-03 PROCEDURE — 83986 ASSAY PH BODY FLUID NOS: CPT

## 2019-10-03 PROCEDURE — 71275 CT ANGIOGRAPHY CHEST: CPT

## 2019-10-03 PROCEDURE — 80048 BASIC METABOLIC PNL TOTAL CA: CPT

## 2019-10-03 PROCEDURE — 85027 COMPLETE CBC AUTOMATED: CPT

## 2019-10-03 PROCEDURE — 84484 ASSAY OF TROPONIN QUANT: CPT

## 2019-10-03 PROCEDURE — 84157 ASSAY OF PROTEIN OTHER: CPT

## 2019-10-03 PROCEDURE — 74177 CT ABD & PELVIS W/CONTRAST: CPT | Mod: 26

## 2019-10-03 PROCEDURE — 82550 ASSAY OF CK (CPK): CPT

## 2019-10-03 PROCEDURE — 94640 AIRWAY INHALATION TREATMENT: CPT

## 2019-10-03 PROCEDURE — 87102 FUNGUS ISOLATION CULTURE: CPT

## 2019-10-03 PROCEDURE — 87070 CULTURE OTHR SPECIMN AEROBIC: CPT

## 2019-10-03 PROCEDURE — 87116 MYCOBACTERIA CULTURE: CPT

## 2019-10-03 PROCEDURE — 83735 ASSAY OF MAGNESIUM: CPT

## 2019-10-03 PROCEDURE — 83880 ASSAY OF NATRIURETIC PEPTIDE: CPT

## 2019-10-03 PROCEDURE — 93005 ELECTROCARDIOGRAM TRACING: CPT

## 2019-10-03 PROCEDURE — 71045 X-RAY EXAM CHEST 1 VIEW: CPT | Mod: 26

## 2019-10-03 PROCEDURE — 93306 TTE W/DOPPLER COMPLETE: CPT

## 2019-10-03 PROCEDURE — 32555 ASPIRATE PLEURA W/ IMAGING: CPT | Mod: LT

## 2019-10-03 PROCEDURE — 97162 PT EVAL MOD COMPLEX 30 MIN: CPT | Mod: GP

## 2019-10-03 PROCEDURE — 93010 ELECTROCARDIOGRAM REPORT: CPT

## 2019-10-03 PROCEDURE — 88108 CYTOPATH CONCENTRATE TECH: CPT

## 2019-10-03 PROCEDURE — 36415 COLL VENOUS BLD VENIPUNCTURE: CPT

## 2019-10-03 PROCEDURE — 87075 CULTR BACTERIA EXCEPT BLOOD: CPT

## 2019-10-03 PROCEDURE — 71045 X-RAY EXAM CHEST 1 VIEW: CPT

## 2019-10-03 PROCEDURE — 83615 LACTATE (LD) (LDH) ENZYME: CPT

## 2019-10-03 PROCEDURE — 84100 ASSAY OF PHOSPHORUS: CPT

## 2019-10-03 PROCEDURE — 89051 BODY FLUID CELL COUNT: CPT

## 2019-10-03 PROCEDURE — 88305 TISSUE EXAM BY PATHOLOGIST: CPT

## 2019-10-03 PROCEDURE — 85025 COMPLETE CBC W/AUTO DIFF WBC: CPT

## 2019-10-03 PROCEDURE — 83605 ASSAY OF LACTIC ACID: CPT

## 2019-10-03 PROCEDURE — 87206 SMEAR FLUORESCENT/ACID STAI: CPT

## 2019-10-03 RX ORDER — FENTANYL CITRATE 50 UG/ML
1 INJECTION INTRAVENOUS
Refills: 0 | Status: DISCONTINUED | OUTPATIENT
Start: 2019-10-03 | End: 2019-10-03

## 2019-10-03 RX ORDER — ONDANSETRON 8 MG/1
4 TABLET, FILM COATED ORAL ONCE
Refills: 0 | Status: COMPLETED | OUTPATIENT
Start: 2019-10-03 | End: 2019-10-03

## 2019-10-03 RX ORDER — DILTIAZEM HCL 120 MG
120 CAPSULE, EXT RELEASE 24 HR ORAL DAILY
Refills: 0 | Status: DISCONTINUED | OUTPATIENT
Start: 2019-10-03 | End: 2019-10-08

## 2019-10-03 RX ORDER — ATORVASTATIN CALCIUM 80 MG/1
80 TABLET, FILM COATED ORAL AT BEDTIME
Refills: 0 | Status: DISCONTINUED | OUTPATIENT
Start: 2019-10-03 | End: 2019-10-08

## 2019-10-03 RX ORDER — GABAPENTIN 400 MG/1
300 CAPSULE ORAL THREE TIMES A DAY
Refills: 0 | Status: DISCONTINUED | OUTPATIENT
Start: 2019-10-03 | End: 2019-10-05

## 2019-10-03 RX ORDER — APIXABAN 2.5 MG/1
5 TABLET, FILM COATED ORAL
Refills: 0 | Status: DISCONTINUED | OUTPATIENT
Start: 2019-10-03 | End: 2019-10-03

## 2019-10-03 RX ORDER — ASPIRIN/CALCIUM CARB/MAGNESIUM 324 MG
81 TABLET ORAL DAILY
Refills: 0 | Status: DISCONTINUED | OUTPATIENT
Start: 2019-10-03 | End: 2019-10-04

## 2019-10-03 RX ORDER — FENTANYL CITRATE 50 UG/ML
1 INJECTION INTRAVENOUS
Refills: 0 | Status: DISCONTINUED | OUTPATIENT
Start: 2019-10-03 | End: 2019-10-08

## 2019-10-03 RX ORDER — FAMOTIDINE 10 MG/ML
20 INJECTION INTRAVENOUS ONCE
Refills: 0 | Status: COMPLETED | OUTPATIENT
Start: 2019-10-03 | End: 2019-10-03

## 2019-10-03 RX ORDER — DOCUSATE SODIUM 100 MG
100 CAPSULE ORAL THREE TIMES A DAY
Refills: 0 | Status: DISCONTINUED | OUTPATIENT
Start: 2019-10-03 | End: 2019-10-08

## 2019-10-03 RX ORDER — IPRATROPIUM/ALBUTEROL SULFATE 18-103MCG
3 AEROSOL WITH ADAPTER (GRAM) INHALATION EVERY 6 HOURS
Refills: 0 | Status: DISCONTINUED | OUTPATIENT
Start: 2019-10-03 | End: 2019-10-08

## 2019-10-03 RX ORDER — KETOROLAC TROMETHAMINE 30 MG/ML
15 SYRINGE (ML) INJECTION EVERY 6 HOURS
Refills: 0 | Status: DISCONTINUED | OUTPATIENT
Start: 2019-10-03 | End: 2019-10-04

## 2019-10-03 RX ORDER — LOSARTAN POTASSIUM 100 MG/1
50 TABLET, FILM COATED ORAL DAILY
Refills: 0 | Status: DISCONTINUED | OUTPATIENT
Start: 2019-10-03 | End: 2019-10-05

## 2019-10-03 RX ORDER — SODIUM CHLORIDE 9 MG/ML
1000 INJECTION, SOLUTION INTRAVENOUS
Refills: 0 | Status: DISCONTINUED | OUTPATIENT
Start: 2019-10-03 | End: 2019-10-04

## 2019-10-03 RX ORDER — ACETAMINOPHEN 500 MG
650 TABLET ORAL EVERY 6 HOURS
Refills: 0 | Status: DISCONTINUED | OUTPATIENT
Start: 2019-10-03 | End: 2019-10-08

## 2019-10-03 RX ORDER — PANTOPRAZOLE SODIUM 20 MG/1
40 TABLET, DELAYED RELEASE ORAL
Refills: 0 | Status: DISCONTINUED | OUTPATIENT
Start: 2019-10-03 | End: 2019-10-08

## 2019-10-03 RX ORDER — SODIUM CHLORIDE 9 MG/ML
1000 INJECTION INTRAMUSCULAR; INTRAVENOUS; SUBCUTANEOUS ONCE
Refills: 0 | Status: COMPLETED | OUTPATIENT
Start: 2019-10-03 | End: 2019-10-03

## 2019-10-03 RX ORDER — BUDESONIDE AND FORMOTEROL FUMARATE DIHYDRATE 160; 4.5 UG/1; UG/1
2 AEROSOL RESPIRATORY (INHALATION)
Refills: 0 | Status: DISCONTINUED | OUTPATIENT
Start: 2019-10-03 | End: 2019-10-08

## 2019-10-03 RX ORDER — FENTANYL CITRATE 50 UG/ML
1 INJECTION INTRAVENOUS
Refills: 0 | Status: DISCONTINUED | OUTPATIENT
Start: 2019-10-03 | End: 2019-10-05

## 2019-10-03 RX ORDER — MORPHINE SULFATE 50 MG/1
2 CAPSULE, EXTENDED RELEASE ORAL EVERY 4 HOURS
Refills: 0 | Status: DISCONTINUED | OUTPATIENT
Start: 2019-10-03 | End: 2019-10-03

## 2019-10-03 RX ORDER — ALBUTEROL 90 UG/1
2 AEROSOL, METERED ORAL EVERY 6 HOURS
Refills: 0 | Status: DISCONTINUED | OUTPATIENT
Start: 2019-10-03 | End: 2019-10-08

## 2019-10-03 RX ORDER — SENNA PLUS 8.6 MG/1
2 TABLET ORAL AT BEDTIME
Refills: 0 | Status: DISCONTINUED | OUTPATIENT
Start: 2019-10-03 | End: 2019-10-04

## 2019-10-03 RX ORDER — LANOLIN ALCOHOL/MO/W.PET/CERES
5 CREAM (GRAM) TOPICAL AT BEDTIME
Refills: 0 | Status: DISCONTINUED | OUTPATIENT
Start: 2019-10-03 | End: 2019-10-08

## 2019-10-03 RX ORDER — MORPHINE SULFATE 50 MG/1
2 CAPSULE, EXTENDED RELEASE ORAL EVERY 4 HOURS
Refills: 0 | Status: DISCONTINUED | OUTPATIENT
Start: 2019-10-03 | End: 2019-10-05

## 2019-10-03 RX ORDER — ONDANSETRON 8 MG/1
4 TABLET, FILM COATED ORAL EVERY 6 HOURS
Refills: 0 | Status: DISCONTINUED | OUTPATIENT
Start: 2019-10-03 | End: 2019-10-08

## 2019-10-03 RX ORDER — ENOXAPARIN SODIUM 100 MG/ML
60 INJECTION SUBCUTANEOUS
Refills: 0 | Status: DISCONTINUED | OUTPATIENT
Start: 2019-10-03 | End: 2019-10-04

## 2019-10-03 RX ADMIN — GABAPENTIN 300 MILLIGRAM(S): 400 CAPSULE ORAL at 15:58

## 2019-10-03 RX ADMIN — ENOXAPARIN SODIUM 60 MILLIGRAM(S): 100 INJECTION SUBCUTANEOUS at 17:27

## 2019-10-03 RX ADMIN — ONDANSETRON 4 MILLIGRAM(S): 8 TABLET, FILM COATED ORAL at 06:08

## 2019-10-03 RX ADMIN — Medication 120 MILLIGRAM(S): at 11:18

## 2019-10-03 RX ADMIN — MORPHINE SULFATE 2 MILLIGRAM(S): 50 CAPSULE, EXTENDED RELEASE ORAL at 18:23

## 2019-10-03 RX ADMIN — FENTANYL CITRATE 1 PATCH: 50 INJECTION INTRAVENOUS at 22:56

## 2019-10-03 RX ADMIN — Medication 3 MILLILITER(S): at 14:26

## 2019-10-03 RX ADMIN — Medication 100 MILLIGRAM(S): at 15:58

## 2019-10-03 RX ADMIN — MORPHINE SULFATE 2 MILLIGRAM(S): 50 CAPSULE, EXTENDED RELEASE ORAL at 17:27

## 2019-10-03 RX ADMIN — Medication 81 MILLIGRAM(S): at 11:18

## 2019-10-03 RX ADMIN — LOSARTAN POTASSIUM 50 MILLIGRAM(S): 100 TABLET, FILM COATED ORAL at 22:46

## 2019-10-03 RX ADMIN — FENTANYL CITRATE 1 PATCH: 50 INJECTION INTRAVENOUS at 15:59

## 2019-10-03 RX ADMIN — MORPHINE SULFATE 2 MILLIGRAM(S): 50 CAPSULE, EXTENDED RELEASE ORAL at 12:30

## 2019-10-03 RX ADMIN — MORPHINE SULFATE 2 MILLIGRAM(S): 50 CAPSULE, EXTENDED RELEASE ORAL at 13:23

## 2019-10-03 RX ADMIN — Medication 0.25 MILLIGRAM(S): at 15:58

## 2019-10-03 RX ADMIN — PANTOPRAZOLE SODIUM 40 MILLIGRAM(S): 20 TABLET, DELAYED RELEASE ORAL at 11:18

## 2019-10-03 RX ADMIN — Medication 1 TABLET(S): at 11:18

## 2019-10-03 RX ADMIN — ATORVASTATIN CALCIUM 80 MILLIGRAM(S): 80 TABLET, FILM COATED ORAL at 22:47

## 2019-10-03 RX ADMIN — FAMOTIDINE 20 MILLIGRAM(S): 10 INJECTION INTRAVENOUS at 06:08

## 2019-10-03 RX ADMIN — Medication 5 MILLIGRAM(S): at 22:47

## 2019-10-03 RX ADMIN — SODIUM CHLORIDE 1000 MILLILITER(S): 9 INJECTION INTRAMUSCULAR; INTRAVENOUS; SUBCUTANEOUS at 03:15

## 2019-10-03 RX ADMIN — GABAPENTIN 300 MILLIGRAM(S): 400 CAPSULE ORAL at 22:46

## 2019-10-03 RX ADMIN — SODIUM CHLORIDE 75 MILLILITER(S): 9 INJECTION, SOLUTION INTRAVENOUS at 11:18

## 2019-10-03 RX ADMIN — Medication 100 MILLIGRAM(S): at 22:46

## 2019-10-03 NOTE — H&P ADULT - NSHPPHYSICALEXAM_GEN_ALL_CORE
Vital Signs Last 24 Hrs  T(C): 37 (03 Oct 2019 07:20), Max: 37 (03 Oct 2019 07:20)  T(F): 98.6 (03 Oct 2019 07:20), Max: 98.6 (03 Oct 2019 07:20)  HR: 84 (03 Oct 2019 07:20) (44 - 99)  BP: 155/58 (03 Oct 2019 07:20) (121/48 - 158/83)  BP(mean): --  RR: 18 (03 Oct 2019 07:20) (18 - 22)  SpO2: 95% (03 Oct 2019 07:20) (91% - 96%)    HEENT:   pupils equal and reactive, EOMI, no oropharyngeal lesions, erythema, exudates, oral thrush    NECK:   supple, no carotid bruits, no palpable lymph nodes, no thyromegaly    CV:  +S1, +S2, regular, no murmurs or rubs    RESP:   lungs clear to auscultation bilaterally, no wheezing, rales, rhonchi, good air entry bilaterally    BREAST:  not examined    GI:  abdomen soft, non-tender, non-distended, normal BS, no bruits, no abdominal masses, no palpable masses    RECTAL:  not examined    :  not examined    MSK:   normal muscle tone, no atrophy, no rigidity, no contractions    EXT:   no clubbing, no cyanosis, no edema, no calf pain, swelling or erythema    VASCULAR:  pulses equal and symmetric in the upper and lower extremities    NEURO:  AAOX3, no focal neurological deficits, follows all commands, able to move extremities spontaneously    SKIN:  no ulcers, lesions or rashes

## 2019-10-03 NOTE — H&P ADULT - NSHPLABSRESULTS_GEN_ALL_CORE
Urinalysis Basic - ( 03 Oct 2019 03:32 )    Color: Yellow / Appearance: Clear / S.010 / pH: x  Gluc: x / Ketone: Negative  / Bili: Negative / Urobili: Negative mg/dL   Blood: x / Protein: Negative mg/dL / Nitrite: Negative   Leuk Esterase: Negative / RBC: x / WBC x   Sq Epi: x / Non Sq Epi: x / Bacteria: x    03 Oct 2019 02:38    139    |  104    |  24     ----------------------------<  131    4.3     |  28     |  0.61     Ca    8.4        03 Oct 2019 02:38    TPro  6.0    /  Alb  3.0    /  TBili  0.6    /  DBili  x      /  AST  25     /  ALT  42     /  AlkPhos  79     03 Oct 2019 02:38  LIVER FUNCTIONS - ( 03 Oct 2019 02:38 )  Alb: 3.0 g/dL / Pro: 6.0 gm/dL / ALK PHOS: 79 U/L / ALT: 42 U/L / AST: 25 U/L / GGT: x         CBC Full  -  ( 03 Oct 2019 02:38 )  WBC Count : 60.90 K/uL  Hemoglobin : 14.4 g/dL  Hematocrit : 43.1 %  Platelet Count - Automated : 248 K/uL  Mean Cell Volume : 94.9 fl  Mean Cell Hemoglobin : 31.7 pg  Mean Cell Hemoglobin Concentration : 33.4 gm/dL  Auto Neutrophil # : 57.86 K/uL  Auto Lymphocyte # : 1.22 K/uL  Auto Monocyte # : 1.83 K/uL  Auto Eosinophil # : 0.00 K/uL  Auto Basophil # : 0.00 K/uL  Auto Neutrophil % : 86.0 %  Auto Lymphocyte % : 2.0 %  Auto Monocyte % : 3.0 %  Auto Eosinophil % : 0.0 %  Auto Basophil % : 0.0 %  CARDIAC MARKERS ( 03 Oct 2019 06:16 )  <0.015 ng/mL / x     / x     / x     / x      CARDIAC MARKERS ( 03 Oct 2019 02:38 )  <0.015 ng/mL / x     / x     / x     / x

## 2019-10-03 NOTE — ED ADULT NURSE NOTE - NSIMPLEMENTINTERV_GEN_ALL_ED
Implemented All Fall Risk Interventions:  [place] to call system. Call bell, personal items and telephone within reach. Instruct patient to call for assistance. Room bathroom lighting operational. Non-slip footwear when patient is off stretcher. Physically safe environment: no spills, clutter or unnecessary equipment. Stretcher in lowest position, wheels locked, appropriate side rails in place. Provide visual cue, wrist band, yellow gown, etc. Monitor gait and stability. Monitor for mental status changes and reorient to person, place, and time. Review medications for side effects contributing to fall risk. Reinforce activity limits and safety measures with patient and family.

## 2019-10-03 NOTE — ED PROVIDER NOTE - OBJECTIVE STATEMENT
86 yo female with h/o COPD, small cell lung CA, HTN, HLD, Afib presents to the ED with c/o abdominal pain.  Pain is diffuse and described as "it feels like there's cement blocks in my belly".  Nausea and dry heaves.  no diarrhea.  no fever.  Pt was just discharged from  in the afternoon on 10/2.  States she was well at discharge.

## 2019-10-03 NOTE — H&P ADULT - HISTORY OF PRESENT ILLNESS
84 y/o F w/ PMH of dementia, paroxysmal afib, HLD, anxiety, smoking history / COPD on 02, JOSHUA chest mass/ hilar mass, left supraclavicular node: biopsy small cell cancer,received  a trial of therapy/ can start CARBO ETOPOSIDE x 1 on 10/1 and neuopogen, now presenting with abdominal pain, nv. 86 y/o F w/ PMH of dementia, paroxysmal afib, HLD, anxiety, smoking history / COPD on 02, JOSHUA chest mass/ hilar mass, left supraclavicular node: biopsy small cell cancer,received  a trial of therapy/ can start CARBO ETOPOSIDE x 1 on 10/1 and neuopogen, now presenting with abdominal pain, nv. Pain is diffuse and described as "it feels like there's cement blocks in my belly".  Nausea and dry heaves.  no diarrhea.  no fever.  Pt was just discharged from  in the afternoon on 10/2.  States she was well at discharge. Patient on my encounter is somewhat disoriented. She is somehwat sedated at my encounter not able to get very good history.   Currently denies any chest pain, sob, fevers, chills.

## 2019-10-03 NOTE — ED ADULT NURSE REASSESSMENT NOTE - NS ED NURSE REASSESS COMMENT FT1
pt ambulated to bathroom w/ NA w/o complications. awaiting admission. pt updated on POC. will cont to monitor.

## 2019-10-03 NOTE — ED CLERICAL - NS ED CLERK NOTE PRE-ARRIVAL INFORMATION; ADDITIONAL PRE-ARRIVAL INFORMATION
This patient is enrolled in the readmission program and has active care navigation. This patient can be followed up by the care navigation team within 24 hours. To arrange close follow-up or to obtain additional clinical information about this patient, please call the contact number above.   Please call the hospitalist as needed to collaborate on further medical management ([phone])  COPD

## 2019-10-03 NOTE — ED ADULT NURSE NOTE - OBJECTIVE STATEMENT
pt p/w c/o abdominal pain.  Pt has hx of small cell CA, Copd, recently discharged from  today.  Pt endorses generalized abdominal pain associated with no n/v/d, or fever

## 2019-10-03 NOTE — H&P ADULT - ASSESSMENT
84 y/o F w/ PMH of dementia, paroxysmal afib, HLD, anxiety, smoking history / COPD on 02, JOSHUA chest mass/ hilar mass, left supraclavicular node: biopsy small cell cancer,received  a trial of therapy/ can start CARBO ETOPOSIDE x 1 on 10/1 and neuopogen, now presenting with abdominal pain, nv.           Small cell lung CA with METS  -received one dose of chemo yesturday and neupogen.  -consult pulmonary, hem/onc, palliative    Chronic respiratory failure      3. afib w/ rvr  - controlled on po cardizem  - eliquis     4. HTN  - Cont. losartan     5. HLD  - Cont. Statin     6. Anxiety  - Cont. xanax PRN BID    Code status: DNR/DNI- MOLST 86 y/o F w/ PMH of dementia, paroxysmal afib, HLD, anxiety, smoking history / COPD on 02, JOSHUA chest mass/ hilar mass, left supraclavicular node: biopsy small cell cancer,received  a trial of therapy/ can start CARBO ETOPOSIDE x 1 on 10/1 and neuopogen, now presenting with abdominal pain, nv. Pain is diffuse and described as "it feels like there's cement blocks in my belly".  Nausea and dry heaves.  no diarrhea.  no fever.  Pt was just discharged from  in the afternoon on 10/2.  States she was well at discharge. Patient on my encounter is somewhat disoriented. She is somehwat sedated at my encounter not able to get very good history.   Currently denies any chest pain, sob, fevers, chills.     1-abdominal pain, and nausea  -c/w zofran prn  -can be secondary to recent chemo?  -ct scan did not show any sbo    2=Small cell lung CA with METS  -received one dose of chemo yesturday and neupogen.  -consult pulmonary, hem/onc, palliative     3-Chronic respiratory failure  -patient wheezing.   -c/w nebs, pulmicort  -pulmonary consult    4-afib w/ rvr  - controlled on po cardizem  -hold eliquis in case scope needed; start lovenox      4. HTN  - Cont. losartan     5. HLD  - Cont. Statin     6. Anxiety  - Cont. xanax PRN BID    7-dvt prophy- sc lovenox      Code status: DNR/DNI- MOLST

## 2019-10-03 NOTE — ED ADULT NURSE REASSESSMENT NOTE - NS ED NURSE REASSESS COMMENT FT1
pt received at change of shift. pt c/o nausea , pain , and generalized discomfort at present. awaiting admission. pt updated on POC. no new orders at present. pending admission. will cont to monitor.

## 2019-10-03 NOTE — ED ADULT TRIAGE NOTE - CHIEF COMPLAINT QUOTE
pt p/w c/o abdominal pain.  Pt has hx of small cell CA, Copd, recently discharged from  today.  Pt endorses generalized abdominal pain associated with no n/v/d, or fever.

## 2019-10-03 NOTE — CONSULT NOTE ADULT - SUBJECTIVE AND OBJECTIVE BOX
HPI: Ms. 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 Found here to have COPD exacerbation c/b rapid afib (the latter resolved). No readmitted 10/3 for c/o abd pain and n/v, with concern for side effect from recent initiation of chemo. Found to have leukocytosis (likely due to dose of neupogen given prior to dc). Palliative Care consulted to assist with establishing GOC, known to our service from last admission (yesterday).     Saw Ms. Juarez this afternoon, no family at bedside. Pt initially asleep, easily awakened by voice. Very anxious and at times rambling (not unusual for her at baseline), but easily calmed. She explained that she was feeling great yesterday, excited to be going home. However, upon getting home, while O2 was delivered no one was present to hook apparatus up or to show her or her sister how to use this. This was infuriating to pt, causing her to make several phone calls, none leading to someone being able to come out to see them. Seems as though this led to exacerbation of anxiety and eventually pt, once lying down began to have severe abdominal pain, burning in nature, and associated with nausea and belching. She denies having pain like this before, noting her usual arm pain (from LAD and back pain, was well controlled with fentanyl patches, without side effect since increase on 9/30). She notes morphine given here was helpful, but began to retch during encounter. She was open to another dose of IV zofran and IV med for anxiety as she was unsure if she would be able to keep usual xanax pill down. She notes trying to have some broth, but giving up on this as it upset her stomach more.     Of note, pt shared speaking with her sister last night, noting that if her sx of n/v and abd pain were truly due to her recent chemo, as she suspects, that she will wont to forgo further treatment. She knows oncology has been reconsulted to discuss this further with her. She was quite symptomatic and wound up during encounter, grateful that we came to see her, but feeling ultimately, that she would be better able to discuss her feelings tomorrow. Obliged this request, noting our most pressing concern was that she was comfortable. Noted intent to check in on her again tomorrow and to talk more.     PAIN: ( x)Yes   ( )No  Level: mod-sev  Location: upper abdomen  Intensity:   6-8 /10  Quality: burning  Aggravating Factors: sometimes position, lying on back, otherwise, trying to eat  Alleviating Factors: zofran  Radiation: denies  Duration/Timing: sporadic  Impact on ADLs: yes    DYSPNEA: (x ) Yes  ( ) No- usual amount, not inc from baseline unless she gets upset    PAST MEDICAL & SURGICAL HISTORY:  Rheumatoid arthritis  Lung mass  HLD (hyperlipidemia)  HTN (hypertension)  COPD (chronic obstructive pulmonary disease)  Carotid stent occlusion, initial encounter  H/O hernia repair  Ectopic thyroid tissue      SOCIAL HX: Lives alone, has a sister and a brother, the latter has developmental delay and is very sick (she is his guardian, seems he lives in a home)    Hx opiate tolerance ( x)YES  ( )NO    Baseline ADLs  (Prior to Admission)  ( x) Independent   ( )Dependent    FAMILY HISTORY:  Sibling with lung cancer      Review of Systems:    Anxiety- yes  Depression- no  Constipation- no  Diarrhea- no  Nausea- yes  Vomiting- yes  Anorexia- now yes, but prior to this (yesterday) no issues  Weight Loss- yes, over time  Cough- no  Secretions- no  Fatigue- at times  Weakness- at times, but walking and accomplishing all ADLs  Delirium- no    All other systems reviewed and negative      PHYSICAL EXAM:    Vital Signs Last 24 Hrs  T(C): 36.2 (03 Oct 2019 12:05), Max: 37 (03 Oct 2019 07:20)  T(F): 97.2 (03 Oct 2019 12:05), Max: 98.6 (03 Oct 2019 07:20)  HR: 76 (03 Oct 2019 14:29) (75 - 99)  BP: 161/89 (03 Oct 2019 12:05) (142/88 - 161/89)  RR: 18 (03 Oct 2019 12:05) (16 - 22)  SpO2: 100% (03 Oct 2019 12:05) (91% - 100%)  Daily Height in cm: 172.72 (03 Oct 2019 12:05)    Daily     PPSV2: 30  %    General: Elderly female sitting up in bed, anxious, and tends to speak fast at baseline, retching once during encounter  Mental Status: AOx4  HEENT: mmm, perrl, nc in place, +temporal and clavicular wasting  Lungs: dec at bases bl  Cardiac: +s1 s2 tachy  GI: soft nt nd +bs  : voids  Ext: moves all extremities, muscle and fat wasting throughout  Neuro: no focal deficits, though some ttp along arm       LABS:                        14.4   60.90 )-----------( 248      ( 03 Oct 2019 02:38 )             43.1     10-03    139  |  104  |  24<H>  ----------------------------<  131<H>  4.3   |  28  |  0.61    Ca    8.4<L>      03 Oct 2019 02:38    TPro  6.0  /  Alb  3.0<L>  /  TBili  0.6  /  DBili  x   /  AST  25  /  ALT  42  /  AlkPhos  79  10-03      Albumin: Albumin, Serum: 3.0 g/dL (10-03 @ 02:38)      Allergies    No Known Allergies    Intolerances      MEDICATIONS  (STANDING):  ALBUTerol/ipratropium for Nebulization 3 milliLiter(s) Nebulizer every 6 hours  aspirin enteric coated 81 milliGRAM(s) Oral daily  atorvastatin 80 milliGRAM(s) Oral at bedtime  buDESOnide 160 MICROgram(s)/formoterol 4.5 MICROgram(s) Inhaler 2 Puff(s) Inhalation two times a day  dextrose 5% + sodium chloride 0.9%. 1000 milliLiter(s) (75 mL/Hr) IV Continuous <Continuous>  diltiazem    milliGRAM(s) Oral daily  docusate sodium 100 milliGRAM(s) Oral three times a day  enoxaparin Injectable 60 milliGRAM(s) SubCutaneous two times a day  fentaNYL   Patch  12 MICROgram(s)/Hr 1 Patch Transdermal every 72 hours  fentaNYL   Patch  25 MICROgram(s)/Hr 1 Patch Transdermal every 72 hours  gabapentin 300 milliGRAM(s) Oral three times a day  losartan 50 milliGRAM(s) Oral daily  melatonin 5 milliGRAM(s) Oral at bedtime  multivitamin 1 Tablet(s) Oral daily  pantoprazole    Tablet 40 milliGRAM(s) Oral before breakfast    MEDICATIONS  (PRN):  acetaminophen   Tablet .. 650 milliGRAM(s) Oral every 6 hours PRN Temp greater or equal to 38C (100.4F), Mild Pain (1 - 3)  ALBUTerol    90 MICROgram(s) HFA Inhaler 2 Puff(s) Inhalation every 6 hours PRN for shortness of breath and/or wheezing  guaiFENesin  milliGRAM(s) Oral every 12 hours PRN congestion  ketorolac   Injectable 15 milliGRAM(s) IV Push every 6 hours PRN Moderate Pain (4 - 6)  LORazepam   Injectable 0.25 milliGRAM(s) IV Push every 6 hours PRN severe anxiety  morphine  - Injectable 2 milliGRAM(s) IV Push every 4 hours PRN Severe Pain (7 - 10)  ondansetron Injectable 4 milliGRAM(s) IV Push every 6 hours PRN Nausea  senna 2 Tablet(s) Oral at bedtime PRN Constipation      RADIOLOGY/ADDITIONAL STUDIES:    EXAM:  CT ABDOMEN AND PELVIS IC                        PROCEDURE DATE:  10/03/2019      IMPRESSION:    No evidence of small bowel obstruction or active bowel inflammation.   Indeterminate 0.7 cm high density focus inthe gastric lumen. It is   unclear if this is ingested material or an enhancing mass. Correlation   with endoscopy suggested.    1.8 cm indeterminate hypodense lesion in the left hepatic lobe. 1.6 cm   exophytic enhancing lesion arising from the posterior lower pole of the   right kidney suspicious for neoplasm. MRI of the abdomen and pelvis is   suggested for further evaluation.    Small left pleural effusion.    Infrarenal abdominal aortic aneurysm measuring 3 cm.    MALCOLM PRETTY   Thisdocument has been electronically signed. Oct  3 2019  5:29AM      EXAM:  XR CHEST PORTABLE IMMED 1V                        PROCEDURE DATE:  10/03/2019      Impression:improved LEFT upper lobe infiltrate with slight worsening of   RIGHT lower lobe infiltrate. Probable tiny LEFT pleural effusion.      GATO ORTEZ M.D., ATTENDING RADIOLOGIST  This document has been electronically signed. Oct  3 2019  1:10PM

## 2019-10-03 NOTE — ED PROVIDER NOTE - CONSTITUTIONAL, MLM
normal... Well appearing, well nourished, awake, alert, oriented to person, place, time/situation; moaning

## 2019-10-03 NOTE — ED ADULT NURSE REASSESSMENT NOTE - NS ED NURSE REASSESS COMMENT FT1
Patient received from previous nurse. Pt is A&Ox4, VSS on O2. Pt has complaints of pain all over, called Dr. Banuelos due to patient fentanyl patches, MD stated that she will verify the orders and call me back. Safety and comfort measures in place. Hourly rounding will be done on my time. Will continue to monitor.

## 2019-10-04 LAB
ANION GAP SERPL CALC-SCNC: 3 MMOL/L — LOW (ref 5–17)
BASOPHILS # BLD AUTO: 0.09 K/UL — SIGNIFICANT CHANGE UP (ref 0–0.2)
BASOPHILS NFR BLD AUTO: 0.3 % — SIGNIFICANT CHANGE UP (ref 0–2)
BUN SERPL-MCNC: 17 MG/DL — SIGNIFICANT CHANGE UP (ref 7–23)
CALCIUM SERPL-MCNC: 7.9 MG/DL — LOW (ref 8.5–10.1)
CHLORIDE SERPL-SCNC: 104 MMOL/L — SIGNIFICANT CHANGE UP (ref 96–108)
CO2 SERPL-SCNC: 33 MMOL/L — HIGH (ref 22–31)
CREAT SERPL-MCNC: 0.6 MG/DL — SIGNIFICANT CHANGE UP (ref 0.5–1.3)
EOSINOPHIL # BLD AUTO: 0.12 K/UL — SIGNIFICANT CHANGE UP (ref 0–0.5)
EOSINOPHIL NFR BLD AUTO: 0.4 % — SIGNIFICANT CHANGE UP (ref 0–6)
GLUCOSE SERPL-MCNC: 82 MG/DL — SIGNIFICANT CHANGE UP (ref 70–99)
HCT VFR BLD CALC: 40.7 % — SIGNIFICANT CHANGE UP (ref 34.5–45)
HGB BLD-MCNC: 13.1 G/DL — SIGNIFICANT CHANGE UP (ref 11.5–15.5)
IMM GRANULOCYTES NFR BLD AUTO: 6.7 % — HIGH (ref 0–1.5)
LYMPHOCYTES # BLD AUTO: 1.56 K/UL — SIGNIFICANT CHANGE UP (ref 1–3.3)
LYMPHOCYTES # BLD AUTO: 5 % — LOW (ref 13–44)
MCHC RBC-ENTMCNC: 31.8 PG — SIGNIFICANT CHANGE UP (ref 27–34)
MCHC RBC-ENTMCNC: 32.2 GM/DL — SIGNIFICANT CHANGE UP (ref 32–36)
MCV RBC AUTO: 98.8 FL — SIGNIFICANT CHANGE UP (ref 80–100)
MONOCYTES # BLD AUTO: 0.39 K/UL — SIGNIFICANT CHANGE UP (ref 0–0.9)
MONOCYTES NFR BLD AUTO: 1.2 % — LOW (ref 2–14)
NEUTROPHILS # BLD AUTO: 27.14 K/UL — HIGH (ref 1.8–7.4)
NEUTROPHILS NFR BLD AUTO: 86.4 % — HIGH (ref 43–77)
PLATELET # BLD AUTO: 177 K/UL — SIGNIFICANT CHANGE UP (ref 150–400)
POTASSIUM SERPL-MCNC: 3.6 MMOL/L — SIGNIFICANT CHANGE UP (ref 3.5–5.3)
POTASSIUM SERPL-SCNC: 3.6 MMOL/L — SIGNIFICANT CHANGE UP (ref 3.5–5.3)
RBC # BLD: 4.12 M/UL — SIGNIFICANT CHANGE UP (ref 3.8–5.2)
RBC # FLD: 13.7 % — SIGNIFICANT CHANGE UP (ref 10.3–14.5)
SODIUM SERPL-SCNC: 140 MMOL/L — SIGNIFICANT CHANGE UP (ref 135–145)
WBC # BLD: 31.4 K/UL — HIGH (ref 3.8–10.5)
WBC # FLD AUTO: 31.4 K/UL — HIGH (ref 3.8–10.5)

## 2019-10-04 PROCEDURE — 71275 CT ANGIOGRAPHY CHEST: CPT | Mod: 26

## 2019-10-04 RX ORDER — IPRATROPIUM/ALBUTEROL SULFATE 18-103MCG
3 AEROSOL WITH ADAPTER (GRAM) INHALATION ONCE
Refills: 0 | Status: COMPLETED | OUTPATIENT
Start: 2019-10-04 | End: 2019-10-04

## 2019-10-04 RX ORDER — SENNA PLUS 8.6 MG/1
2 TABLET ORAL AT BEDTIME
Refills: 0 | Status: DISCONTINUED | OUTPATIENT
Start: 2019-10-04 | End: 2019-10-08

## 2019-10-04 RX ORDER — ALPRAZOLAM 0.25 MG
0.25 TABLET ORAL
Refills: 0 | Status: DISCONTINUED | OUTPATIENT
Start: 2019-10-04 | End: 2019-10-08

## 2019-10-04 RX ORDER — APIXABAN 2.5 MG/1
5 TABLET, FILM COATED ORAL EVERY 12 HOURS
Refills: 0 | Status: DISCONTINUED | OUTPATIENT
Start: 2019-10-05 | End: 2019-10-05

## 2019-10-04 RX ORDER — MORPHINE SULFATE 50 MG/1
5 CAPSULE, EXTENDED RELEASE ORAL EVERY 4 HOURS
Refills: 0 | Status: DISCONTINUED | OUTPATIENT
Start: 2019-10-04 | End: 2019-10-08

## 2019-10-04 RX ADMIN — BUDESONIDE AND FORMOTEROL FUMARATE DIHYDRATE 2 PUFF(S): 160; 4.5 AEROSOL RESPIRATORY (INHALATION) at 20:27

## 2019-10-04 RX ADMIN — Medication 5 MILLIGRAM(S): at 22:49

## 2019-10-04 RX ADMIN — SENNA PLUS 2 TABLET(S): 8.6 TABLET ORAL at 22:49

## 2019-10-04 RX ADMIN — Medication 3 MILLILITER(S): at 15:05

## 2019-10-04 RX ADMIN — FENTANYL CITRATE 1 PATCH: 50 INJECTION INTRAVENOUS at 19:21

## 2019-10-04 RX ADMIN — PANTOPRAZOLE SODIUM 40 MILLIGRAM(S): 20 TABLET, DELAYED RELEASE ORAL at 06:22

## 2019-10-04 RX ADMIN — Medication 100 MILLIGRAM(S): at 12:49

## 2019-10-04 RX ADMIN — GABAPENTIN 300 MILLIGRAM(S): 400 CAPSULE ORAL at 06:22

## 2019-10-04 RX ADMIN — Medication 0.25 MILLIGRAM(S): at 12:49

## 2019-10-04 RX ADMIN — GABAPENTIN 300 MILLIGRAM(S): 400 CAPSULE ORAL at 13:13

## 2019-10-04 RX ADMIN — FENTANYL CITRATE 1 PATCH: 50 INJECTION INTRAVENOUS at 07:53

## 2019-10-04 RX ADMIN — Medication 3 MILLILITER(S): at 09:11

## 2019-10-04 RX ADMIN — FENTANYL CITRATE 1 PATCH: 50 INJECTION INTRAVENOUS at 16:59

## 2019-10-04 RX ADMIN — ENOXAPARIN SODIUM 60 MILLIGRAM(S): 100 INJECTION SUBCUTANEOUS at 16:59

## 2019-10-04 RX ADMIN — Medication 100 MILLIGRAM(S): at 22:49

## 2019-10-04 RX ADMIN — Medication 100 MILLIGRAM(S): at 06:22

## 2019-10-04 RX ADMIN — Medication 1 TABLET(S): at 12:22

## 2019-10-04 RX ADMIN — Medication 3 MILLILITER(S): at 20:16

## 2019-10-04 RX ADMIN — GABAPENTIN 300 MILLIGRAM(S): 400 CAPSULE ORAL at 22:49

## 2019-10-04 RX ADMIN — Medication 81 MILLIGRAM(S): at 12:22

## 2019-10-04 RX ADMIN — ATORVASTATIN CALCIUM 80 MILLIGRAM(S): 80 TABLET, FILM COATED ORAL at 22:49

## 2019-10-04 RX ADMIN — ENOXAPARIN SODIUM 60 MILLIGRAM(S): 100 INJECTION SUBCUTANEOUS at 06:22

## 2019-10-04 RX ADMIN — Medication 3 MILLILITER(S): at 06:30

## 2019-10-04 RX ADMIN — FENTANYL CITRATE 1 PATCH: 50 INJECTION INTRAVENOUS at 00:41

## 2019-10-04 RX ADMIN — Medication 600 MILLIGRAM(S): at 06:41

## 2019-10-04 NOTE — PROGRESS NOTE ADULT - SUBJECTIVE AND OBJECTIVE BOX
Subjective:  Patient is a 85y old  Female who presents with a chief complaint of cancer, nausea, abdominal pain, vomiting     HPI:     84 y/o F w/ PMH of dementia, paroxysmal afib, HLD, anxiety, smoking history / COPD on , JOSHUA chest mass/ hilar mass, left supraclavicular node: biopsy small cell cancer, received  a trial of therapy/ can start CARBO ETOPOSIDE x 1 on 10/1 and neurogen, readmitted on 10/3/19 with h abdominal pain, nv. Pain is diffuse and described as "it feels like there's cement blocks in my belly".  Nausea and dry heaves.  no diarrhea.  no fever.  Pt was just discharged from  in the afternoon on 10/2.  States she was well at discharge. Patient on my encounter is somewhat disoriented. She is somewhat sedated at my encounter not able to get very good history.     10/4 - Patient seen and examined at bedside earlier today, feels better, denies pain,  occasional cough, denies abdominal pain, tolerates clear liquid diet    Review of system- Rest of the review of system are negative except mentioned in HPI    OBJECTIVE:   T(C): 36.8 (10-04-19 @ 11:43), Max: 36.8 (10-04-19 @ 11:43)  HR: 72 (10-04-19 @ 15:06) (55 - 85)  BP: 110/38 (10-04-19 @ 11:43) (98/48 - 110/38)  RR: 20 (10-04-19 @ 11:43) (18 - 20)  SpO2: 97% (10-04-19 @ 15:06) (95% - 98%)  Wt(kg): --  Daily     Daily     PHYSICAL EXAM:  GENERAL: NAD  NERVOUS SYSTEM:  Alert & Oriented X3, non- focal exam, Motor Strength 5/5 B/L upper and lower extremities; DTRs 2+ intact and symmetric  HEAD:  Atraumatic, Normocephalic  EYES: EOMI, PERRLA, conjunctiva and sclera clear  HEENT: Moist mucous membranes  NECK: Supple, No JVD  CHEST/LUNG: BS decreased at bases, L>R  No rales, +  rhonchi, no wheezing, or rubs  HEART: Regular rate and rhythm; No murmurs, rubs, or gallops  ABDOMEN: Soft, Nontender, Nondistended; Bowel sounds present  GENITOURINARY- Voiding, no suprapubic tenderness  EXTREMITIES:  2+ Peripheral Pulses, No clubbing, cyanosis, or edema  MUSCULOSKELETAL:- No muscle tenderness, Muscle tone normal, No joint tenderness, no Joint swelling, Joint range of motion-normal  SKIN-no rash, no lesion    LABS:                        13.1   31.40 )-----------( 177      ( 04 Oct 2019 08:15 )             40.7     10    140  |  104  |  17  ----------------------------<  82  3.6   |  33<H>  |  0.60    Ca    7.9<L>      04 Oct 2019 08:15    TPro  6.0  /  Alb  3.0<L>  /  TBili  0.6  /  DBili  x   /  AST  25  /  ALT  42  /  AlkPhos  79  10-03      CARDIAC MARKERS ( 03 Oct 2019 06:16 )  <0.015 ng/mL / x     / x     / x     / x      CARDIAC MARKERS ( 03 Oct 2019 02:38 )  <0.015 ng/mL / x     / x     / x     / x        EKG - NSR   Urinalysis Basic - ( 03 Oct 2019 03:32 )    Color: Yellow / Appearance: Clear / S.010 / pH: x  Gluc: x / Ketone: Negative  / Bili: Negative / Urobili: Negative mg/dL   Blood: x / Protein: Negative mg/dL / Nitrite: Negative   Leuk Esterase: Negative / RBC: x / WBC x   Sq Epi: x / Non Sq Epi: x / Bacteria: x      CAPILLARY BLOOD GLUCOSE      RECENT CULTURES:    RADIOLOGY & ADDITIONAL TESTS:  < from: CT Angio Chest PE Protocol w/ IV Cont (10.04.19 @ 09:58) >    IMPRESSION:       1. Bilateral lower lobe and right middle lobe segmental and subsegmental   branches not evaluated due to extensive motion artifact. No central or   upper lobe pulmonary embolism.     2. Grossly stable appearance of left suprahilar/mediastinal mass and   spiculated left apical mass. Persistent findings of endobronchial spread   of tumor and/or postobstructive pneumonia in the left upper lobe.    3. Mild interval increase in now moderate size left pleural effusion.      < end of copied text >  < from: Xray Chest 1 View-PORTABLE IMMEDIATE (10.03.19 @ 06:30) >  AP radiograph of the chest demonstrates improved LEFT upper lobe   infiltrate with slight worsening of RIGHT lower lobe infiltrate. Probable   tiny LEFT pleural effusion. The cardiac silhouette is normal in size.   Osseous structures are intact.    Impression:improved LEFT upper lobe infiltrate with slight worsening of   RIGHT lower lobe infiltrate. Probable tiny LEFT pleural effusion.    < end of copied text >    Current medications:  acetaminophen   Tablet .. 650 milliGRAM(s) Oral every 6 hours PRN  ALBUTerol    90 MICROgram(s) HFA Inhaler 2 Puff(s) Inhalation every 6 hours PRN  ALBUTerol/ipratropium for Nebulization 3 milliLiter(s) Nebulizer every 6 hours  ALPRAZolam 0.25 milliGRAM(s) Oral two times a day PRN  aspirin enteric coated 81 milliGRAM(s) Oral daily  atorvastatin 80 milliGRAM(s) Oral at bedtime  buDESOnide 160 MICROgram(s)/formoterol 4.5 MICROgram(s) Inhaler 2 Puff(s) Inhalation two times a day  diltiazem    milliGRAM(s) Oral daily  docusate sodium 100 milliGRAM(s) Oral three times a day  enoxaparin Injectable 60 milliGRAM(s) SubCutaneous two times a day  fentaNYL   Patch  12 MICROgram(s)/Hr 1 Patch Transdermal every 72 hours  fentaNYL   Patch  25 MICROgram(s)/Hr 1 Patch Transdermal every 72 hours  gabapentin 300 milliGRAM(s) Oral three times a day  guaiFENesin  milliGRAM(s) Oral every 12 hours PRN  ketorolac   Injectable 15 milliGRAM(s) IV Push every 6 hours PRN  losartan 50 milliGRAM(s) Oral daily  melatonin 5 milliGRAM(s) Oral at bedtime  morphine  - Injectable 2 milliGRAM(s) IV Push every 4 hours PRN  morphine Concentrate 5 milliGRAM(s) Oral every 4 hours PRN  multivitamin 1 Tablet(s) Oral daily  ondansetron Injectable 4 milliGRAM(s) IV Push every 6 hours PRN  pantoprazole    Tablet 40 milliGRAM(s) Oral before breakfast  senna 2 Tablet(s) Oral at bedtime PRN

## 2019-10-04 NOTE — CONSULT NOTE ADULT - SUBJECTIVE AND OBJECTIVE BOX
Patient is a 85y old  Female who presents with a chief complaint of cancer, nausea, abdominal pain, vommtting (03 Oct 2019 15:43)      HPI:  86 y/o F w/ PMH of dementia, paroxysmal afib, HLD, anxiety, smoking history / COPD on 02, JOSHUA chest mass/ hilar mass, left supraclavicular node: biopsy small cell cancer,received  a trial of therapy/ can start CARBO ETOPOSIDE x 1 on 10/1 and neuopogen, now presenting with abdominal pain, nv. Pain is diffuse and described as "it feels like there's cement blocks in my belly".  Nausea and dry heaves.  no diarrhea.  no fever.  Pt was just discharged from  in the afternoon on 10/2.  States she was well at discharge. Patient on my encounter is somewhat disoriented. She is somehwat sedated at my encounter not able to get very good history.   Currently denies any chest pain, sob, fevers, chills. (03 Oct 2019 08:17)    pt comf   has cough and pain inc a little with coughing  pos N and neg vomit todday  she is a poor historian      PAST MEDICAL & SURGICAL HISTORY:  Rheumatoid arthritis  Lung mass  HLD (hyperlipidemia)  HTN (hypertension)  COPD (chronic obstructive pulmonary disease)  Carotid stent occlusion, initial encounter  H/O hernia repair  Ectopic thyroid tissue      MEDICATIONS  (STANDING):  ALBUTerol/ipratropium for Nebulization 3 milliLiter(s) Nebulizer every 6 hours  ALBUTerol/ipratropium for Nebulization. 3 milliLiter(s) Nebulizer once  aspirin enteric coated 81 milliGRAM(s) Oral daily  atorvastatin 80 milliGRAM(s) Oral at bedtime  buDESOnide 160 MICROgram(s)/formoterol 4.5 MICROgram(s) Inhaler 2 Puff(s) Inhalation two times a day  dextrose 5% + sodium chloride 0.9%. 1000 milliLiter(s) (75 mL/Hr) IV Continuous <Continuous>  diltiazem    milliGRAM(s) Oral daily  docusate sodium 100 milliGRAM(s) Oral three times a day  enoxaparin Injectable 60 milliGRAM(s) SubCutaneous two times a day  fentaNYL   Patch  12 MICROgram(s)/Hr 1 Patch Transdermal every 72 hours  fentaNYL   Patch  25 MICROgram(s)/Hr 1 Patch Transdermal every 72 hours  gabapentin 300 milliGRAM(s) Oral three times a day  losartan 50 milliGRAM(s) Oral daily  melatonin 5 milliGRAM(s) Oral at bedtime  multivitamin 1 Tablet(s) Oral daily  pantoprazole    Tablet 40 milliGRAM(s) Oral before breakfast    MEDICATIONS  (PRN):  acetaminophen   Tablet .. 650 milliGRAM(s) Oral every 6 hours PRN Temp greater or equal to 38C (100.4F), Mild Pain (1 - 3)  ALBUTerol    90 MICROgram(s) HFA Inhaler 2 Puff(s) Inhalation every 6 hours PRN for shortness of breath and/or wheezing  guaiFENesin  milliGRAM(s) Oral every 12 hours PRN congestion  ketorolac   Injectable 15 milliGRAM(s) IV Push every 6 hours PRN Moderate Pain (4 - 6)  LORazepam   Injectable 0.25 milliGRAM(s) IV Push every 6 hours PRN severe anxiety  morphine  - Injectable 2 milliGRAM(s) IV Push every 4 hours PRN Severe Pain (7 - 10)  ondansetron Injectable 4 milliGRAM(s) IV Push every 6 hours PRN Nausea  senna 2 Tablet(s) Oral at bedtime PRN Constipation      Allergies    No Known Allergies    Intolerances        SOCIAL HISTORY:NC    FAMILY HISTORY:  No pertinent family history in first degree relatives      REVIEW OF SYSTEMS:    CONSTITUTIONAL: No weakness, fevers or chills  EYES/ENT: No visual changes;  No vertigo or throat pain   NECK: No pain or stiffness  RESPIRATORY: No cough, wheezing, hemoptysis; No shortness of breath  CARDIOVASCULAR: No chest pain or palpitations  GENITOURINARY: No dysuria, frequency or hematuria  NEUROLOGICAL: No numbness or weakness  SKIN: No itching, burning, rashes, or lesions   All other review of systems is negative unless indicated above.    Vital Signs Last 24 Hrs  T(C): 36.4 (04 Oct 2019 05:10), Max: 36.8 (03 Oct 2019 19:46)  T(F): 97.6 (04 Oct 2019 05:10), Max: 98.3 (03 Oct 2019 19:46)  HR: 80 (04 Oct 2019 06:41) (75 - 86)  BP: 98/48 (04 Oct 2019 06:25) (98/48 - 161/89)  BP(mean): --  RR: 18 (04 Oct 2019 05:10) (16 - 18)  SpO2: 98% (04 Oct 2019 05:10) (98% - 100%)    PHYSICAL EXAM:    Constitutional: NAD, well-developed  HEENT: EOMI, throat clear  Neck: No LAD, supple  Respiratory: CTA and P  Cardiovascular: S1 and S2, RRR, no M  Gastrointestinal: BS+, soft, epig with pos Carnet sign/ND, neg HSM,  Extremities: No peripheral edema, neg clubing, cyanosis  Vascular: 2+ peripheral pulses  Neurological: A/O x 3, no focal deficits  Psychiatric: Normal mood, normal affect  Skin: No rashes    LABS:  CBC Full  -  ( 03 Oct 2019 02:38 )  WBC Count : 60.90 K/uL  RBC Count : 4.54 M/uL  Hemoglobin : 14.4 g/dL  Hematocrit : 43.1 %  Platelet Count - Automated : 248 K/uL  Mean Cell Volume : 94.9 fl  Mean Cell Hemoglobin : 31.7 pg  Mean Cell Hemoglobin Concentration : 33.4 gm/dL  Auto Neutrophil # : 57.86 K/uL  Auto Lymphocyte # : 1.22 K/uL  Auto Monocyte # : 1.83 K/uL  Auto Eosinophil # : 0.00 K/uL  Auto Basophil # : 0.00 K/uL  Auto Neutrophil % : 86.0 %  Auto Lymphocyte % : 2.0 %  Auto Monocyte % : 3.0 %  Auto Eosinophil % : 0.0 %  Auto Basophil % : 0.0 %    10-03    139  |  104  |  24<H>  ----------------------------<  131<H>  4.3   |  28  |  0.61    Ca    8.4<L>      03 Oct 2019 02:38    TPro  6.0  /  Alb  3.0<L>  /  TBili  0.6  /  DBili  x   /  AST  25  /  ALT  42  /  AlkPhos  79  10-03            RADIOLOGY & ADDITIONAL STUDIES:  < from: CT Abdomen and Pelvis w/ IV Cont (10.03.19 @ 04:57) >  EXAM:  CT ABDOMEN AND PELVIS IC                            PROCEDURE DATE:  10/03/2019          INTERPRETATION:  CLINICAL INFORMATION: Abdominal pain and vomiting    TECHNIQUE: CT imaging of the abdomen and pelvis was performed with IV and   oral contrast. 90 mL of Omnipaque 350 was injected intravenously. 10 mL   was discarded.    COMPARISON: CT of the chest dated 9/13/2019    FINDINGS:    Emphysematous changes of the lung bases. Small left pleural effusion.    The spleen, pancreas, and biliary tree are unremarkable. The liver is   normal in morphology. There is a 1.8 cm indeterminate hypodense lesion in   the left hepatic lobe. There is cholelithiasis.    The kidneys enhance symmetrically without hydronephrosis. Bilateral renal   cysts and subcentimeter hypodense lesions are too small to characterize.   1.6 cm exophytic enhancing lesion arising from the posterior lower pole   of the right kidney suspicious for neoplasm. The adrenal glands are   unremarkable.    Indeterminate 0.7 cm high density focus in the gastric lumen (sequence 2,   image 22). There is no bowel obstruction or ascites. Scattered colonic   diverticula. The appendix is normal.    The urinary bladder is unremarkable. Fibroid uterus.    There is no abdominal or pelvic lymphadenopathy.    Infrarenal abdominal aortic aneurysm measuring 3 cm.    Degenerative changes of the spine.          IMPRESSION:    No evidence of small bowel obstruction or active bowel inflammation.   Indeterminate 0.7 cm high density focus inthe gastric lumen. It is   unclear if this is ingested material or an enhancing mass. Correlation   with endoscopy suggested.    1.8 cm indeterminate hypodense lesion in the left hepatic lobe. 1.6 cm   exophytic enhancing lesion arising from the posterior lower pole of the   right kidney suspicious for neoplasm. MRI of the abdomen and pelvis is   suggested for further evaluation.    Small left pleural effusion.    Infrarenal abdominal aortic aneurysm measuring 3 cm.    < end of copied text >      per oncology had PET scan as out pt and will await review

## 2019-10-04 NOTE — PROGRESS NOTE ADULT - ASSESSMENT
84 y/o F w/ PMH of dementia, paroxysmal afib, HLD, anxiety, smoking history / COPD on 02, JOSHUA chest mass/ hilar mass, left supraclavicular node: biopsy small cell cancer, received  a trial of therapy/ can start CARBO ETOPOSIDE x 1 on 10/1 and neuopogen, now presenting with abdominal pain, nv. Pain is diffuse and described as "it feels like there's cement blocks in my belly".  Nausea and dry heaves.  no diarrhea.  no fever.  Pt was just discharged from  in the afternoon on 10/2.  States she was well at discharge. Patient on my encounter is somewhat disoriented. She is somehwat sedated at my encounter not able to get very good history.   Currently denies any chest pain, sob, fevers, chills.     * Abdominal pain, and nausea likely steroids induced gastritis   * Gastric lesion on CT vs food   -c/w zofran prn  -can be secondary to recent chemo?  -ct scan noted  - PPI   - o/p PET as per oncology      * Small cell lung CA JOSHUA with left supraclavicular node   - s/p chemo x 1   -received one dose of chemo yesterday and Neupogen  -consult pulmonary, hem/onc, palliative      * Chronic hypoxic respiratory failure, COPD   * Moderate left sided pleural effusion s/p IV hydration  -patient wheezing.   -c/w nebs, pulmicort  -pulmonary consult  - will need nebs at home   - pulmonary consult  - stop IV fluids, repeat CXR     * Leukocytosis  induced by neupogen  - no signs of PNA     * afib w/ rvr  - controlled on po cardizem  - switch lovenox to eliquis in am    *  HTN  - Cont. losartan     *  HLD  - Cont. Statin     *  Anxiety  - Cont. xanax PRN BID    dvt prophy- sc lovenox    SW - needs more support to go home      Code status: DNR/DNI- MOLST

## 2019-10-04 NOTE — PROGRESS NOTE ADULT - SUBJECTIVE AND OBJECTIVE BOX
HPI: Pt seen and examined this am in follow up for sx and GOC. Pt notes pain is resolved and she is feeling much better. She notes baseline sob, which feels unchanged from her usual. Pt has no other complaints, sharing plan to go home today.     Inquired about her plan other that discharge- in regard to her cancer. She notes she does not think she will continue with chemo, though she is not sure, wanting to meet with Dr. Tay in the community for follow up before settling on this decision. Given her quick readmission and sx burden, urged her to consider home hospice if she decides firmly against further cancer treatment. Instructed her (and also wrote down on her note sheet) to ask Dr. Tay about helping her get this in place if they agree on no further chemo, as he is affiliated with a hospice service. She noted intent to do so. She recalled us talking about this with her sister Lety as well on last admission, finding the paper on which she took notes at that time which clarified the benefits that come with the service. For now, she is focused on going home, feeling better, though glad to know about that option.       PAIN: denies  DYSPNEA: back at baseline      ROS:  All other systems reviewed and negative      PHYSICAL EXAM:    Vital Signs Last 24 Hrs  T(C): 36.4 (04 Oct 2019 05:10), Max: 36.8 (03 Oct 2019 19:46)  T(F): 97.6 (04 Oct 2019 05:10), Max: 98.3 (03 Oct 2019 19:46)  HR: 80 (04 Oct 2019 06:41) (75 - 86)  BP: 98/48 (04 Oct 2019 06:25) (98/48 - 161/89)  RR: 18 (04 Oct 2019 05:10) (16 - 18)  SpO2: 98% (04 Oct 2019 05:10) (98% - 100%)  Daily Height in cm: 172.72 (03 Oct 2019 12:05)    Daily     PPSV2: 30  %    General: Elderly female sitting up in bed, smiling, cheerful, NAD  Mental Status: AOx4  HEENT: mmm, perrl, nc in place, +temporal and clavicular wasting  Lungs: dec at bases bl  Cardiac: +s1 s2 tachy  GI: soft nt nd +bs  : voids  Ext: moves all extremities, muscle and fat wasting throughout  Neuro: no focal deficits    LABS:                        13.1   31.40 )-----------( 177      ( 04 Oct 2019 08:15 )             40.7     10-04    140  |  104  |  17  ----------------------------<  82  3.6   |  33<H>  |  0.60    Ca    7.9<L>      04 Oct 2019 08:15    TPro  6.0  /  Alb  3.0<L>  /  TBili  0.6  /  DBili  x   /  AST  25  /  ALT  42  /  AlkPhos  79  10-03      Albumin: Albumin, Serum: 3.0 g/dL (10-03 @ 02:38)      Allergies    No Known Allergies    Intolerances      MEDICATIONS  (STANDING):  ALBUTerol/ipratropium for Nebulization 3 milliLiter(s) Nebulizer every 6 hours  aspirin enteric coated 81 milliGRAM(s) Oral daily  atorvastatin 80 milliGRAM(s) Oral at bedtime  buDESOnide 160 MICROgram(s)/formoterol 4.5 MICROgram(s) Inhaler 2 Puff(s) Inhalation two times a day  dextrose 5% + sodium chloride 0.9%. 1000 milliLiter(s) (75 mL/Hr) IV Continuous <Continuous>  diltiazem    milliGRAM(s) Oral daily  docusate sodium 100 milliGRAM(s) Oral three times a day  enoxaparin Injectable 60 milliGRAM(s) SubCutaneous two times a day  fentaNYL   Patch  12 MICROgram(s)/Hr 1 Patch Transdermal every 72 hours  fentaNYL   Patch  25 MICROgram(s)/Hr 1 Patch Transdermal every 72 hours  gabapentin 300 milliGRAM(s) Oral three times a day  losartan 50 milliGRAM(s) Oral daily  melatonin 5 milliGRAM(s) Oral at bedtime  multivitamin 1 Tablet(s) Oral daily  pantoprazole    Tablet 40 milliGRAM(s) Oral before breakfast    MEDICATIONS  (PRN):  acetaminophen   Tablet .. 650 milliGRAM(s) Oral every 6 hours PRN Temp greater or equal to 38C (100.4F), Mild Pain (1 - 3)  ALBUTerol    90 MICROgram(s) HFA Inhaler 2 Puff(s) Inhalation every 6 hours PRN for shortness of breath and/or wheezing  ALPRAZolam 0.25 milliGRAM(s) Oral two times a day PRN anxiety  guaiFENesin  milliGRAM(s) Oral every 12 hours PRN congestion  ketorolac   Injectable 15 milliGRAM(s) IV Push every 6 hours PRN Moderate Pain (4 - 6)  morphine  - Injectable 2 milliGRAM(s) IV Push every 4 hours PRN Severe Pain (7 - 10)  morphine Concentrate 5 milliGRAM(s) Oral every 4 hours PRN pain or dyspnea  ondansetron Injectable 4 milliGRAM(s) IV Push every 6 hours PRN Nausea  senna 2 Tablet(s) Oral at bedtime PRN Constipation

## 2019-10-04 NOTE — PROVIDER CONTACT NOTE (OTHER) - BACKGROUND
Patient COPD and Patient has history of COPD and small cell lung ca, recently  d/c from [facility], now admitted for abdominal pain

## 2019-10-04 NOTE — CONSULT NOTE ADULT - SUBJECTIVE AND OBJECTIVE BOX
HPI:  84 y/o F COPD with limited small cell cancer : JOSHUA chest mass/ hilar mass, left supraclavicular node; s/p  CARBO ETOPOSIDE x 1 on 10/1 and neurogen now presenting with abdominal pain, nausea, dyspnea / As per patient her O2 was running out therefore she returned to the ER    Feeling a little better this AM   Pain resolved  On     PAST MEDICAL & SURGICAL HISTORY:  Rheumatoid arthritis  Lung mass  HLD (hyperlipidemia)  HTN (hypertension)  COPD (chronic obstructive pulmonary disease)  Carotid stent occlusion, initial encounter  H/O hernia repair  Ectopic thyroid tissue      MEDICATIONS  (STANDING):  ALBUTerol/ipratropium for Nebulization 3 milliLiter(s) Nebulizer every 6 hours  aspirin enteric coated 81 milliGRAM(s) Oral daily  atorvastatin 80 milliGRAM(s) Oral at bedtime  buDESOnide 160 MICROgram(s)/formoterol 4.5 MICROgram(s) Inhaler 2 Puff(s) Inhalation two times a day  dextrose 5% + sodium chloride 0.9%. 1000 milliLiter(s) (75 mL/Hr) IV Continuous <Continuous>  diltiazem    milliGRAM(s) Oral daily  docusate sodium 100 milliGRAM(s) Oral three times a day  enoxaparin Injectable 60 milliGRAM(s) SubCutaneous two times a day  fentaNYL   Patch  12 MICROgram(s)/Hr 1 Patch Transdermal every 72 hours  fentaNYL   Patch  25 MICROgram(s)/Hr 1 Patch Transdermal every 72 hours  gabapentin 300 milliGRAM(s) Oral three times a day  losartan 50 milliGRAM(s) Oral daily  melatonin 5 milliGRAM(s) Oral at bedtime  multivitamin 1 Tablet(s) Oral daily  pantoprazole    Tablet 40 milliGRAM(s) Oral before breakfast    MEDICATIONS  (PRN):  acetaminophen   Tablet .. 650 milliGRAM(s) Oral every 6 hours PRN Temp greater or equal to 38C (100.4F), Mild Pain (1 - 3)  ALBUTerol    90 MICROgram(s) HFA Inhaler 2 Puff(s) Inhalation every 6 hours PRN for shortness of breath and/or wheezing  guaiFENesin  milliGRAM(s) Oral every 12 hours PRN congestion  ketorolac   Injectable 15 milliGRAM(s) IV Push every 6 hours PRN Moderate Pain (4 - 6)  LORazepam   Injectable 0.25 milliGRAM(s) IV Push every 6 hours PRN severe anxiety  morphine  - Injectable 2 milliGRAM(s) IV Push every 4 hours PRN Severe Pain (7 - 10)  ondansetron Injectable 4 milliGRAM(s) IV Push every 6 hours PRN Nausea  senna 2 Tablet(s) Oral at bedtime PRN Constipation      Allergies    No Known Allergies    Intolerances        SOCIAL HISTORY:    FAMILY HISTORY:  No pertinent family history in first degree relatives      Vital Signs Last 24 Hrs  T(C): 36.4 (04 Oct 2019 05:10), Max: 36.8 (03 Oct 2019 19:46)  T(F): 97.6 (04 Oct 2019 05:10), Max: 98.3 (03 Oct 2019 19:46)  HR: 80 (04 Oct 2019 06:41) (75 - 86)  BP: 98/48 (04 Oct 2019 06:25) (98/48 - 161/89)  BP(mean): --  RR: 18 (04 Oct 2019 05:10) (16 - 18)  SpO2: 98% (04 Oct 2019 05:10) (98% - 100%)  NAD  VSS  HEENT neg  Lungs clear  Cor S1 S2 no murmur  Abd soft   Ext neg  Neuro neg    LABS:                        13.1   31.40 )-----------( 177      ( 04 Oct 2019 08:15 )             40.7     10-04    140  |  104  |  17  ----------------------------<  82  3.6   |  33<H>  |  0.60    Ca    7.9<L>      04 Oct 2019 08:15    TPro  6.0  /  Alb  3.0<L>  /  TBili  0.6  /  DBili  x   /  AST  25  /  ALT  42  /  AlkPhos  79  10-03      Urinalysis Basic - ( 03 Oct 2019 03:32 )    Color: Yellow / Appearance: Clear / S.010 / pH: x  Gluc: x / Ketone: Negative  / Bili: Negative / Urobili: Negative mg/dL   Blood: x / Protein: Negative mg/dL / Nitrite: Negative   Leuk Esterase: Negative / RBC: x / WBC x   Sq Epi: x / Non Sq Epi: x / Bacteria: x        RADIOLOGY & ADDITIONAL STUDIES:

## 2019-10-04 NOTE — PROGRESS NOTE ADULT - ASSESSMENT
85y old Female coming from home with hx of COPD, Left Lung mass (biopsied on 9/16 found to be metastatic small cell carcinoma), Afib, RA previously on immunosuppressant tx, HLD, HTN, recently admitted 9/ for above dx and again 9/25 until yesterday for worsening of shortness of breath w/ associated with palpitation. Found here to have COPD exacerbation c/b rapid afib (the latter resolved). No readmitted 10/3 for c/o abd pain and n/v, with concern for side effect from recent initiation of chemo. Found to have leukocytosis, likely due to neupogen, and CT ruling out obstruction but showing high density focus in lumen. CXR also showing infiltrate bl. Palliative Care consulted to assist with establishing GOC, known to our service from last admission (yesterday).     1) Pain  - likely neuropathic pain from underlying metastatic disease with ayse involvement c/b possible reflux/chemo side effect, the latter is resolved today  - c/w gabapentin (already at a reasonable dose)  - c/w IV morphine 2mg q4h prn severe  - added back Roxanol 5mg q4h prn for longer-lasting relief sinc n/v resolved and pt able to tolerate po  - c/w fentanyl 37mcg     2) Nausea and vomiting  - now resolved  - imaging ruling out obstruction but seeing ?mass  - n/v may also be side effect of newly initiated chemo  - c/w zofran  - c/w PPI given description of sx suggesting GERD, GI notes appreciated    3) Anxiety  - since n/v has resolved reordered xanax po 0.25mg bid prn  - anxiety likely also exacerbating perception of sx    4) chronic resp failure  - multifactorial- likely due to COPD exacerbation atop new lung cancer and now CXR showing infiltrates  - c/w nebs and bronchodilators  - c/w supplemental O2  - discussed the usefulness of opioids for air hunger with pt as well, may use morphine for this as well  - pulm consult appreciated    5) Metastatic small cell ca  - reviewed onc impression from last admission  - was considering palliative chemo (Dr. Campbell) and RT (Dr. Raya) 2 admissions ago and agreed to initiating chemo with Dr. Tay  - seemed to have tolerated this well  - though last dose 10/1 and sx may be delayed side effect  - onc consult appreciated, plan for follow up outpt    6) Debility  - quite independent before this, but admits to walking with walker and her family being concerned about fall  - PPS 40-50% now due to sx  - evidence of malnutrition    7) GOC/Advanced Directives  - pt has capacity for decision making, is clear about her feelings, and reasonable enough to consider other opinions  - HCP on file naming her sister Lety Cardenas   - MOLST revised 9/30: DNR, limited medical interventions, DNI, send to hospital, no feeding tube, trial of IVF, determine use of abx  - GOC meeting held with pt and sister 9/30- plan to c/w cancer treatment and return home when deemed medically stable with home palliative care services ([facility]). Pt now with sx resolved and clear that she is going home today with intent to follow up with onc cc: further tx, with information shared on how to arrange hospice services if this meeting leads to a decision to forgo further tx. Discussed with primary team during rounds    Thank you for including us in Ms. Juarze's care. Will continue to follow with you.    Douglas Mcclure MD  Palliative Care Attending

## 2019-10-04 NOTE — CONSULT NOTE ADULT - SUBJECTIVE AND OBJECTIVE BOX
Patient is a 85y old  Female who presents with a chief complaint of cancer, nausea, abdominal pain, vommtting (04 Oct 2019 07:57)      HPI:  84 y/o F w/ PMH of dementia, paroxysmal afib, HLD, anxiety, smoking history / COPD on , JOSHUA chest mass/ hilar mass, left supraclavicular node: biopsy small cell cancer,received  a trial of therapy/ can start CARBO ETOPOSIDE x 1 on 10/1 and neuopogen, now presenting with abdominal pain, nv. Pain is diffuse and described as "it feels like there's cement blocks in my belly".  Nausea and dry heaves.  no diarrhea.  no fever.  Pt was just discharged from  in the afternoon on 10/2.  States she was well at discharge. Patient on my encounter is somewhat disoriented. She is somehwat sedated at my encounter not able to get very good history.   Currently denies any chest pain, sob, fevers, chills. (03 Oct 2019 08:17)    seen and eval today  data discussed with Dr Powers and Dr Giles  no hemoptysis  no pleuritic pain  some cough  PAST MEDICAL & SURGICAL HISTORY:  Rheumatoid arthritis  Lung mass  HLD (hyperlipidemia)  HTN (hypertension)  COPD (chronic obstructive pulmonary disease)  Carotid stent occlusion, initial encounter  H/O hernia repair  Ectopic thyroid tissue      PREVIOUS DIAGNOSTIC TESTING:        Home Medications:  albuterol 2.5 mg/3 mL (0.083%) inhalation solution: 3 milliliter(s) inhaled every 4 to 6 hours, As Needed - for shortness of breath and/or wheezing (03 Oct 2019 09:58)  ALPRAZolam 0.25 mg oral tablet: 1 tab(s) orally 2 times a day, As Needed - for anxiety, for insomnia (03 Oct 2019 09:58)  aspirin 81 mg oral delayed release tablet: 1 tab(s) orally once a day (03 Oct 2019 09:58)  B Complex: 1 tab(s) orally once a day (03 Oct 2019 09:58)  dilTIAZem 120 mg/24 hours oral capsule, extended release: 1 cap(s) orally once a day (03 Oct 2019 09:58)  Eliquis 5 mg oral tablet: 1 tab(s) orally 2 times a day  ***unsure of pt&#x27;s last dose*** (03 Oct 2019 09:58)  fentaNYL 25 mcg/hr transdermal film, extended release: 1 film(s) transdermal every 72 hours  **pt just picked up from drug store- has not applied one yet** (03 Oct 2019 09:58)  gabapentin 300 mg oral capsule: 1 cap(s) orally 3 times a day (03 Oct 2019 09:58)  irbesartan 150 mg oral tablet: 1 tab(s) orally once a day (03 Oct 2019 09:58)  Multiple Vitamins oral tablet: 1 tab(s) orally once a day (03 Oct 2019 09:58)  PreserVision AREDS 2 oral capsule: 1 cap(s) orally 2 times a day (03 Oct 2019 09:58)  rosuvastatin 40 mg oral tablet: 1 tab(s) orally once a day (03 Oct 2019 09:58)  Symbicort 160 mcg-4.5 mcg/inh inhalation aerosol: 2 puff(s) inhaled 2 times a day (03 Oct 2019 09:58)  Ventolin HFA 90 mcg/inh inhalation aerosol: 2 puff(s) inhaled every 4 to 6 hours, As Needed - for shortness of breath and/or wheezing (03 Oct 2019 09:58)  Vitamin D3 1000 intl units oral tablet: 1 tab(s) orally once a day (03 Oct 2019 09:58)    MEDICATIONS  (STANDING):  ALBUTerol/ipratropium for Nebulization 3 milliLiter(s) Nebulizer every 6 hours  ALBUTerol/ipratropium for Nebulization. 3 milliLiter(s) Nebulizer once  aspirin enteric coated 81 milliGRAM(s) Oral daily  atorvastatin 80 milliGRAM(s) Oral at bedtime  buDESOnide 160 MICROgram(s)/formoterol 4.5 MICROgram(s) Inhaler 2 Puff(s) Inhalation two times a day  dextrose 5% + sodium chloride 0.9%. 1000 milliLiter(s) (75 mL/Hr) IV Continuous <Continuous>  diltiazem    milliGRAM(s) Oral daily  docusate sodium 100 milliGRAM(s) Oral three times a day  enoxaparin Injectable 60 milliGRAM(s) SubCutaneous two times a day  fentaNYL   Patch  12 MICROgram(s)/Hr 1 Patch Transdermal every 72 hours  fentaNYL   Patch  25 MICROgram(s)/Hr 1 Patch Transdermal every 72 hours  gabapentin 300 milliGRAM(s) Oral three times a day  losartan 50 milliGRAM(s) Oral daily  melatonin 5 milliGRAM(s) Oral at bedtime  multivitamin 1 Tablet(s) Oral daily  pantoprazole    Tablet 40 milliGRAM(s) Oral before breakfast    MEDICATIONS  (PRN):  acetaminophen   Tablet .. 650 milliGRAM(s) Oral every 6 hours PRN Temp greater or equal to 38C (100.4F), Mild Pain (1 - 3)  ALBUTerol    90 MICROgram(s) HFA Inhaler 2 Puff(s) Inhalation every 6 hours PRN for shortness of breath and/or wheezing  guaiFENesin  milliGRAM(s) Oral every 12 hours PRN congestion  ketorolac   Injectable 15 milliGRAM(s) IV Push every 6 hours PRN Moderate Pain (4 - 6)  LORazepam   Injectable 0.25 milliGRAM(s) IV Push every 6 hours PRN severe anxiety  morphine  - Injectable 2 milliGRAM(s) IV Push every 4 hours PRN Severe Pain (7 - 10)  ondansetron Injectable 4 milliGRAM(s) IV Push every 6 hours PRN Nausea  senna 2 Tablet(s) Oral at bedtime PRN Constipation      FAMILY HISTORY:  No pertinent family history in first degree relatives      SOCIAL HISTORY:  active chronic smoker 1ppd x 65 yrs    REVIEW OF SYSTEM:  Pertinent items are noted in HPI.  Constitutional negative for chills, fevers, sweats and pos weight loss  throat, and face:  negative for epistaxis,pos nasal congestion, sore throat and   tinnitus  Respiratory: some cough,pos dyspnea on exertion, pleuritic chest pain  and wheezing  Cardiovascular:  negative for chest pain,pos dyspnea and palpitations  Gastrointestinal: pos for abdominal pain, diarrhea, nausea and vomiting  Genitourinary: negative for dysuria, frequency and urinary incontinence  Skin:  negative for redness, rash, pruritus, swelling, dryness and   fissures  Hematologic/lymphatic: negative for bleeding and easy bruising    Vital Signs Last 24 Hrs  T(C): 36.4 (04 Oct 2019 05:10), Max: 36.8 (03 Oct 2019 19:46)  T(F): 97.6 (04 Oct 2019 05:10), Max: 98.3 (03 Oct 2019 19:46)  HR: 80 (04 Oct 2019 06:41) (75 - 86)  BP: 98/48 (04 Oct 2019 06:25) (98/48 - 161/89)  BP(mean): --  RR: 18 (04 Oct 2019 05:10) (16 - 18)  SpO2: 98% (04 Oct 2019 05:10) (98% - 100%)    I&O's Summary    PHYSICAL EXAM  General Appearance: cooperative, no acute distress,   HEENT: PERRL, conjunctiva clear, EOM's intact, non injected pharynx, no exudate, TM   normal  Neck: Supple, , no adenopathy, thyroid: not enlarged, no carotid bruit or JVD  Back: Symmetric, no  tenderness,no soft tissue tenderness  Lungs: hyperresonnat to percussion, mild end exp wheeze, scattered lower lobe rhonchi  Heart: Regular rate and rhythm, S1, S2 normal, no murmur, rub or gallop  Abdomen: Soft, non-tender, bowel sounds active , no hepatosplenomegaly  Extremities: no cyanosis or edema, no joint swelling  Skin: Skin color, texture normal, no rashes   Neurologic: Alert and oriented X3 , cranial nerves intact, sensory and motor normal,    ECG:    LABS:                          14.4   60.90 )-----------( 248      ( 03 Oct 2019 02:38 )             43.1     10-03    139  |  104  |  24<H>  ----------------------------<  131<H>  4.3   |  28  |  0.61    Ca    8.4<L>      03 Oct 2019 02:38    TPro  6.0  /  Alb  3.0<L>  /  TBili  0.6  /  DBili  x   /  AST  25  /  ALT  42  /  AlkPhos  79  10-03    CARDIAC MARKERS ( 03 Oct 2019 06:16 )  <0.015 ng/mL / x     / x     / x     / x      CARDIAC MARKERS ( 03 Oct 2019 02:38 )  <0.015 ng/mL / x     / x     / x     / x                Urinalysis Basic - ( 03 Oct 2019 03:32 )    Color: Yellow / Appearance: Clear / S.010 / pH: x  Gluc: x / Ketone: Negative  / Bili: Negative / Urobili: Negative mg/dL   Blood: x / Protein: Negative mg/dL / Nitrite: Negative   Leuk Esterase: Negative / RBC: x / WBC x   Sq Epi: x / Non Sq Epi: x / Bacteria: x    < from: Xray Chest 1 View-PORTABLE IMMEDIATE (10.03.19 @ 06:30) >  PROCEDURE DATE:  10/03/2019          INTERPRETATION:  History: Chest and abdominal pain    Chest:  one view.      Comparison: 2019    AP radiograph of the chest demonstrates improved LEFT upper lobe   infiltrate with slight worsening of RIGHT lower lobe infiltrate. Probable   tiny LEFT pleural effusion. The cardiac silhouette is normal in size.   Osseous structures are intact.    Impression:improved LEFT upper lobe infiltrate with slight worsening of   RIGHT lower lobe infiltrate. Probable tiny LEFT pleural effusion.    < end of copied text >          RADIOLOGY & ADDITIONAL STUDIES:    < from: CT Abdomen and Pelvis w/ IV Cont (10.03.19 @ 04:57) >  IMPRESSION:    No evidence of small bowel obstruction or active bowel inflammation.   Indeterminate 0.7 cm high density focus inthe gastric lumen. It is   unclear if this is ingested material or an enhancing mass. Correlation   with endoscopy suggested.    1.8 cm indeterminate hypodense lesion in the left hepatic lobe. 1.6 cm   exophytic enhancing lesion arising from the posterior lower pole of the   right kidney suspicious for neoplasm. MRI of the abdomen and pelvis is   suggested for further evaluation.    Small left pleural effusion.    Infrarenal abdominal aortic aneurysm measuring 3 cm.    < end of copied text >

## 2019-10-05 LAB
ANION GAP SERPL CALC-SCNC: 4 MMOL/L — LOW (ref 5–17)
BASOPHILS # BLD AUTO: 0.04 K/UL — SIGNIFICANT CHANGE UP (ref 0–0.2)
BASOPHILS NFR BLD AUTO: 0.2 % — SIGNIFICANT CHANGE UP (ref 0–2)
BUN SERPL-MCNC: 12 MG/DL — SIGNIFICANT CHANGE UP (ref 7–23)
CALCIUM SERPL-MCNC: 7.7 MG/DL — LOW (ref 8.5–10.1)
CHLORIDE SERPL-SCNC: 103 MMOL/L — SIGNIFICANT CHANGE UP (ref 96–108)
CHOLEST SERPL-MCNC: 94 MG/DL — SIGNIFICANT CHANGE UP (ref 10–199)
CO2 SERPL-SCNC: 32 MMOL/L — HIGH (ref 22–31)
CREAT SERPL-MCNC: 0.49 MG/DL — LOW (ref 0.5–1.3)
EOSINOPHIL # BLD AUTO: 0.17 K/UL — SIGNIFICANT CHANGE UP (ref 0–0.5)
EOSINOPHIL NFR BLD AUTO: 1 % — SIGNIFICANT CHANGE UP (ref 0–6)
GLUCOSE SERPL-MCNC: 85 MG/DL — SIGNIFICANT CHANGE UP (ref 70–99)
HCT VFR BLD CALC: 35.3 % — SIGNIFICANT CHANGE UP (ref 34.5–45)
HDLC SERPL-MCNC: 54 MG/DL — SIGNIFICANT CHANGE UP
HGB BLD-MCNC: 11.5 G/DL — SIGNIFICANT CHANGE UP (ref 11.5–15.5)
IMM GRANULOCYTES NFR BLD AUTO: 3.1 % — HIGH (ref 0–1.5)
LIPID PNL WITH DIRECT LDL SERPL: 26 MG/DL — SIGNIFICANT CHANGE UP
LYMPHOCYTES # BLD AUTO: 1.54 K/UL — SIGNIFICANT CHANGE UP (ref 1–3.3)
LYMPHOCYTES # BLD AUTO: 8.9 % — LOW (ref 13–44)
MCHC RBC-ENTMCNC: 31.9 PG — SIGNIFICANT CHANGE UP (ref 27–34)
MCHC RBC-ENTMCNC: 32.6 GM/DL — SIGNIFICANT CHANGE UP (ref 32–36)
MCV RBC AUTO: 97.8 FL — SIGNIFICANT CHANGE UP (ref 80–100)
MONOCYTES # BLD AUTO: 0.37 K/UL — SIGNIFICANT CHANGE UP (ref 0–0.9)
MONOCYTES NFR BLD AUTO: 2.1 % — SIGNIFICANT CHANGE UP (ref 2–14)
NEUTROPHILS # BLD AUTO: 14.7 K/UL — HIGH (ref 1.8–7.4)
NEUTROPHILS NFR BLD AUTO: 84.7 % — HIGH (ref 43–77)
NT-PROBNP SERPL-SCNC: 655 PG/ML — HIGH (ref 0–450)
PLATELET # BLD AUTO: 141 K/UL — LOW (ref 150–400)
POTASSIUM SERPL-MCNC: 3.5 MMOL/L — SIGNIFICANT CHANGE UP (ref 3.5–5.3)
POTASSIUM SERPL-SCNC: 3.5 MMOL/L — SIGNIFICANT CHANGE UP (ref 3.5–5.3)
RBC # BLD: 3.61 M/UL — LOW (ref 3.8–5.2)
RBC # FLD: 13.5 % — SIGNIFICANT CHANGE UP (ref 10.3–14.5)
SODIUM SERPL-SCNC: 139 MMOL/L — SIGNIFICANT CHANGE UP (ref 135–145)
TOTAL CHOLESTEROL/HDL RATIO MEASUREMENT: 1.7 RATIO — LOW (ref 3.3–7.1)
TRIGL SERPL-MCNC: 68 MG/DL — SIGNIFICANT CHANGE UP (ref 10–149)
WBC # BLD: 17.35 K/UL — HIGH (ref 3.8–10.5)
WBC # FLD AUTO: 17.35 K/UL — HIGH (ref 3.8–10.5)

## 2019-10-05 RX ORDER — FUROSEMIDE 40 MG
20 TABLET ORAL ONCE
Refills: 0 | Status: COMPLETED | OUTPATIENT
Start: 2019-10-05 | End: 2019-10-05

## 2019-10-05 RX ORDER — SIMETHICONE 80 MG/1
80 TABLET, CHEWABLE ORAL ONCE
Refills: 0 | Status: COMPLETED | OUTPATIENT
Start: 2019-10-05 | End: 2019-10-05

## 2019-10-05 RX ORDER — FUROSEMIDE 40 MG
20 TABLET ORAL DAILY
Refills: 0 | Status: DISCONTINUED | OUTPATIENT
Start: 2019-10-06 | End: 2019-10-08

## 2019-10-05 RX ORDER — POLYETHYLENE GLYCOL 3350 17 G/17G
17 POWDER, FOR SOLUTION ORAL DAILY
Refills: 0 | Status: DISCONTINUED | OUTPATIENT
Start: 2019-10-05 | End: 2019-10-08

## 2019-10-05 RX ORDER — ENOXAPARIN SODIUM 100 MG/ML
60 INJECTION SUBCUTANEOUS EVERY 12 HOURS
Refills: 0 | Status: DISCONTINUED | OUTPATIENT
Start: 2019-10-05 | End: 2019-10-06

## 2019-10-05 RX ORDER — GABAPENTIN 400 MG/1
300 CAPSULE ORAL EVERY 12 HOURS
Refills: 0 | Status: DISCONTINUED | OUTPATIENT
Start: 2019-10-05 | End: 2019-10-08

## 2019-10-05 RX ADMIN — Medication 120 MILLIGRAM(S): at 06:12

## 2019-10-05 RX ADMIN — Medication 5 MILLIGRAM(S): at 21:50

## 2019-10-05 RX ADMIN — BUDESONIDE AND FORMOTEROL FUMARATE DIHYDRATE 2 PUFF(S): 160; 4.5 AEROSOL RESPIRATORY (INHALATION) at 20:16

## 2019-10-05 RX ADMIN — Medication 1 TABLET(S): at 13:45

## 2019-10-05 RX ADMIN — SENNA PLUS 2 TABLET(S): 8.6 TABLET ORAL at 21:51

## 2019-10-05 RX ADMIN — ENOXAPARIN SODIUM 60 MILLIGRAM(S): 100 INJECTION SUBCUTANEOUS at 17:47

## 2019-10-05 RX ADMIN — FENTANYL CITRATE 1 PATCH: 50 INJECTION INTRAVENOUS at 19:43

## 2019-10-05 RX ADMIN — Medication 3 MILLILITER(S): at 20:10

## 2019-10-05 RX ADMIN — APIXABAN 5 MILLIGRAM(S): 2.5 TABLET, FILM COATED ORAL at 06:12

## 2019-10-05 RX ADMIN — SIMETHICONE 80 MILLIGRAM(S): 80 TABLET, CHEWABLE ORAL at 23:03

## 2019-10-05 RX ADMIN — POLYETHYLENE GLYCOL 3350 17 GRAM(S): 17 POWDER, FOR SOLUTION ORAL at 17:48

## 2019-10-05 RX ADMIN — Medication 3 MILLILITER(S): at 08:49

## 2019-10-05 RX ADMIN — FENTANYL CITRATE 1 PATCH: 50 INJECTION INTRAVENOUS at 13:46

## 2019-10-05 RX ADMIN — GABAPENTIN 300 MILLIGRAM(S): 400 CAPSULE ORAL at 17:47

## 2019-10-05 RX ADMIN — Medication 100 MILLIGRAM(S): at 21:51

## 2019-10-05 RX ADMIN — Medication 100 MILLIGRAM(S): at 13:46

## 2019-10-05 RX ADMIN — Medication 600 MILLIGRAM(S): at 21:50

## 2019-10-05 RX ADMIN — PANTOPRAZOLE SODIUM 40 MILLIGRAM(S): 20 TABLET, DELAYED RELEASE ORAL at 06:12

## 2019-10-05 RX ADMIN — BUDESONIDE AND FORMOTEROL FUMARATE DIHYDRATE 2 PUFF(S): 160; 4.5 AEROSOL RESPIRATORY (INHALATION) at 08:49

## 2019-10-05 RX ADMIN — GABAPENTIN 300 MILLIGRAM(S): 400 CAPSULE ORAL at 06:11

## 2019-10-05 RX ADMIN — ATORVASTATIN CALCIUM 80 MILLIGRAM(S): 80 TABLET, FILM COATED ORAL at 21:51

## 2019-10-05 RX ADMIN — Medication 100 MILLIGRAM(S): at 06:12

## 2019-10-05 RX ADMIN — LOSARTAN POTASSIUM 50 MILLIGRAM(S): 100 TABLET, FILM COATED ORAL at 06:12

## 2019-10-05 NOTE — PROVIDER CONTACT NOTE (OTHER) - NAME OF MD/NP/PA/DO NOTIFIED:
GI consult Dr. Grimes left message with Romana/office
Pulm consult Dr. Amador left msg with Katelyn/office
Ann
MD Alvarez

## 2019-10-05 NOTE — PROGRESS NOTE ADULT - SUBJECTIVE AND OBJECTIVE BOX
Subjective:  Patient is a 85y old  Female who presents with a chief complaint of cancer, nausea, abdominal pain, vomiting     HPI:     86 y/o F w/ PMH of dementia, paroxysmal afib, HLD, anxiety, smoking history / COPD on , JOSHUA chest mass/ hilar mass, left supraclavicular node: biopsy small cell cancer, received  a trial of therapy/ can start CARBO ETOPOSIDE x 1 on 10/1 and neurogen, readmitted on 10/3/19 with h abdominal pain, nv. Pain is diffuse and described as "it feels like there's cement blocks in my belly".  Nausea and dry heaves.  no diarrhea.  no fever.  Pt was just discharged from  in the afternoon on 10/2.  States she was well at discharge. Patient on my encounter is somewhat disoriented. She is somewhat sedated at my encounter not able to get very good history.     10/4 - Patient seen and examined at bedside earlier today, feels better, denies pain,  occasional cough, denies abdominal pain, tolerates clear liquid diet  10/5 - pt seen and examined , denies cp, dyspnea, afebrile, + constipation, pain well controlled, tolerates regular food     Review of system- Rest of the review of system are negative except mentioned in HPI    OBJECTIVE:   T(C): 36.7 (10-05-19 @ 13:31), Max: 37.1 (10-04-19 @ 21:00)  T(F): 98 (10-05-19 @ 13:31), Max: 98.7 (10-04-19 @ 21:00)  HR: 100 (10-05-19 @ :31) (64 - 100)  BP: 97/61 (10-05-19 @ 13:31) (97/61 - 130/56)  RR: 18 (10-05-19 @ :31) (18 - 18)  SpO2: 98% (10-05-19 @ :31) (95% - 98%)  Wt(kg): -    PHYSICAL EXAM:  GENERAL: NAD  NERVOUS SYSTEM:  Alert & Oriented X3, non- focal exam, Motor Strength 5/5 B/L upper and lower extremities; DTRs 2+ intact and symmetric  HEAD:  Atraumatic, Normocephalic  EYES: EOMI, PERRLA, conjunctiva and sclera clear  HEENT: Moist mucous membranes  NECK: Supple, No JVD  CHEST/LUNG: BS decreased at bases, L>R  No rales, +  rhonchi, no wheezing, or rubs  HEART: Regular rate and rhythm; No murmurs, rubs, or gallops  ABDOMEN: Soft, Nontender, Nondistended; Bowel sounds present  GENITOURINARY- Voiding, no suprapubic tenderness  EXTREMITIES:  2+ Peripheral Pulses, No clubbing, cyanosis, or edema  MUSCULOSKELETAL:- No muscle tenderness, Muscle tone normal, No joint tenderness, no Joint swelling, Joint range of motion-normal  SKIN-no rash, no lesion    LABS:  10-05    139  |  103  |  12  ----------------------------<  85  3.5   |  32<H>  |  0.49<L>    Ca    7.7<L>      05 Oct 2019 06:22                         11.5   17.35 )-----------( 141      ( 05 Oct 2019 06:22 )             35.3                               13.1   31.40 )-----------( 177      ( 04 Oct 2019 08:15 )             40.7     10-04    140  |  104  |  17  ----------------------------<  82  3.6   |  33<H>  |  0.60    Ca    7.9<L>      04 Oct 2019 08:15    TPro  6.0  /  Alb  3.0<L>  /  TBili  0.6  /  DBili  x   /  AST  25  /  ALT  42  /  AlkPhos  79  10-03      CARDIAC MARKERS ( 03 Oct 2019 06:16 )  <0.015 ng/mL / x     / x     / x     / x      CARDIAC MARKERS ( 03 Oct 2019 02:38 )  <0.015 ng/mL / x     / x     / x     / x        EKG - NSR   Urinalysis Basic - ( 03 Oct 2019 03:32 )    Color: Yellow / Appearance: Clear / S.010 / pH: x  Gluc: x / Ketone: Negative  / Bili: Negative / Urobili: Negative mg/dL   Blood: x / Protein: Negative mg/dL / Nitrite: Negative   Leuk Esterase: Negative / RBC: x / WBC x   Sq Epi: x / Non Sq Epi: x / Bacteria: x      CAPILLARY BLOOD GLUCOSE      RECENT CULTURES:    RADIOLOGY & ADDITIONAL TESTS:  < from: CT Angio Chest PE Protocol w/ IV Cont (10.04.19 @ 09:58) >    IMPRESSION:       1. Bilateral lower lobe and right middle lobe segmental and subsegmental   branches not evaluated due to extensive motion artifact. No central or   upper lobe pulmonary embolism.     2. Grossly stable appearance of left suprahilar/mediastinal mass and   spiculated left apical mass. Persistent findings of endobronchial spread   of tumor and/or postobstructive pneumonia in the left upper lobe.    3. Mild interval increase in now moderate size left pleural effusion.      < end of copied text >  < from: Xray Chest 1 View-PORTABLE IMMEDIATE (10.03.19 @ 06:30) >  AP radiograph of the chest demonstrates improved LEFT upper lobe   infiltrate with slight worsening of RIGHT lower lobe infiltrate. Probable   tiny LEFT pleural effusion. The cardiac silhouette is normal in size.   Osseous structures are intact.    Impression:improved LEFT upper lobe infiltrate with slight worsening of   RIGHT lower lobe infiltrate. Probable tiny LEFT pleural effusion.    < end of copied text >    Current medications:  acetaminophen   Tablet .. 650 milliGRAM(s) Oral every 6 hours PRN  ALBUTerol    90 MICROgram(s) HFA Inhaler 2 Puff(s) Inhalation every 6 hours PRN  ALBUTerol/ipratropium for Nebulization 3 milliLiter(s) Nebulizer every 6 hours  ALPRAZolam 0.25 milliGRAM(s) Oral two times a day PRN  aspirin enteric coated 81 milliGRAM(s) Oral daily  atorvastatin 80 milliGRAM(s) Oral at bedtime  buDESOnide 160 MICROgram(s)/formoterol 4.5 MICROgram(s) Inhaler 2 Puff(s) Inhalation two times a day  diltiazem    milliGRAM(s) Oral daily  docusate sodium 100 milliGRAM(s) Oral three times a day  enoxaparin Injectable 60 milliGRAM(s) SubCutaneous two times a day  fentaNYL   Patch  12 MICROgram(s)/Hr 1 Patch Transdermal every 72 hours  fentaNYL   Patch  25 MICROgram(s)/Hr 1 Patch Transdermal every 72 hours  gabapentin 300 milliGRAM(s) Oral three times a day  guaiFENesin  milliGRAM(s) Oral every 12 hours PRN  ketorolac   Injectable 15 milliGRAM(s) IV Push every 6 hours PRN  losartan 50 milliGRAM(s) Oral daily  melatonin 5 milliGRAM(s) Oral at bedtime  morphine  - Injectable 2 milliGRAM(s) IV Push every 4 hours PRN  morphine Concentrate 5 milliGRAM(s) Oral every 4 hours PRN  multivitamin 1 Tablet(s) Oral daily  ondansetron Injectable 4 milliGRAM(s) IV Push every 6 hours PRN  pantoprazole    Tablet 40 milliGRAM(s) Oral before breakfast  senna 2 Tablet(s) Oral at bedtime PRN

## 2019-10-05 NOTE — PROGRESS NOTE ADULT - SUBJECTIVE AND OBJECTIVE BOX
HPI:  84 y/o F COPD with limited small cell cancer : JOSHUA chest mass/ hilar mass, left supraclavicular node; s/p  CARBO ETOPOSIDE x 1 on 10/1 and neurogen now presenting with abdominal pain, nausea, dyspnea / As per patient her O2 was running out therefore she returned to the ER.    patient lives alone at home and has no support    Feeling better this AM   Pain resolved  On 02  left arm pain also resolved    PAST MEDICAL & SURGICAL HISTORY:  Rheumatoid arthritis  Lung mass  HLD (hyperlipidemia)  HTN (hypertension)  COPD (chronic obstructive pulmonary disease)  Carotid stent occlusion, initial encounter  H/O hernia repair  Ectopic thyroid tissue    MEDICATIONS  (STANDING):  ALBUTerol/ipratropium for Nebulization 3 milliLiter(s) Nebulizer every 6 hours  atorvastatin 80 milliGRAM(s) Oral at bedtime  buDESOnide 160 MICROgram(s)/formoterol 4.5 MICROgram(s) Inhaler 2 Puff(s) Inhalation two times a day  diltiazem    milliGRAM(s) Oral daily  docusate sodium 100 milliGRAM(s) Oral three times a day  enoxaparin Injectable 60 milliGRAM(s) SubCutaneous every 12 hours  fentaNYL   Patch  25 MICROgram(s)/Hr 1 Patch Transdermal every 72 hours  furosemide   Injectable 20 milliGRAM(s) IV Push once  gabapentin 300 milliGRAM(s) Oral every 12 hours  melatonin 5 milliGRAM(s) Oral at bedtime  multivitamin 1 Tablet(s) Oral daily  pantoprazole    Tablet 40 milliGRAM(s) Oral before breakfast  senna 2 Tablet(s) Oral at bedtime    MEDICATIONS  (PRN):  acetaminophen   Tablet .. 650 milliGRAM(s) Oral every 6 hours PRN Temp greater or equal to 38C (100.4F), Mild Pain (1 - 3)  ALBUTerol    90 MICROgram(s) HFA Inhaler 2 Puff(s) Inhalation every 6 hours PRN for shortness of breath and/or wheezing  ALPRAZolam 0.25 milliGRAM(s) Oral two times a day PRN anxiety  guaiFENesin  milliGRAM(s) Oral every 12 hours PRN congestion  morphine  - Injectable 2 milliGRAM(s) IV Push every 4 hours PRN Severe Pain (7 - 10)  morphine Concentrate 5 milliGRAM(s) Oral every 4 hours PRN pain or dyspnea  ondansetron Injectable 4 milliGRAM(s) IV Push every 6 hours PRN Nausea          SOCIAL HISTORY:  long history of smoking  still smoking intermittently      Vital Signs Last 24 Hrs  T(C): 36.8 (05 Oct 2019 04:38), Max: 37.1 (04 Oct 2019 21:00)  T(F): 98.2 (05 Oct 2019 04:38), Max: 98.7 (04 Oct 2019 21:00)  HR: 70 (05 Oct 2019 08:40) (64 - 77)  BP: 102/48 (05 Oct 2019 08:40) (102/48 - 130/56)  BP(mean): --  RR: 18 (05 Oct 2019 04:38) (18 - 18)  SpO2: 97% (05 Oct 2019 04:38) (95% - 97%)    OE  frail  on O2   chest clear  anxious                            11.5   17.35 )-----------( 141      ( 05 Oct 2019 06:22 )             35.3     10-05    139  |  103  |  12  ----------------------------<  85  3.5   |  32<H>  |  0.49<L>    Ca    7.7<L>      05 Oct 2019 06:22                      RADIOLOGY & ADDITIONAL STUDIES:

## 2019-10-05 NOTE — PROGRESS NOTE ADULT - SUBJECTIVE AND OBJECTIVE BOX
Patient is a 85y old  Female who presents with a chief complaint of cancer, nausea, abdominal pain, vommtting (04 Oct 2019 07:57)      HPI:  86 y/o F w/ PMH of dementia, paroxysmal afib, HLD, anxiety, smoking history / COPD on , JOSHUA chest mass/ hilar mass, left supraclavicular node: biopsy small cell cancer,received  a trial of therapy/ can start CARBO ETOPOSIDE x 1 on 10/1 and neuopogen, now presenting with abdominal pain, nv. Pain is diffuse and described as "it feels like there's cement blocks in my belly".  Nausea and dry heaves.  no diarrhea.  no fever.  Pt was just discharged from  in the afternoon on 10/2.  States she was well at discharge. Patient on my encounter is somewhat disoriented. She is somehwat sedated at my encounter not able to get very good history.   Currently denies any chest pain, sob, fevers, chills. (03 Oct 2019 08:17)  seen and eval today  data discussed with Dr Powers and Dr Giles  no hemoptysis  no pleuritic pain  some cough    10/5  No acute pulmonary events occurred overnight     PAST MEDICAL & SURGICAL HISTORY:  Rheumatoid arthritis  Lung mass  HLD (hyperlipidemia)  HTN (hypertension)  COPD (chronic obstructive pulmonary disease)  Carotid stent occlusion, initial encounter  H/O hernia repair  Ectopic thyroid tissue      PREVIOUS DIAGNOSTIC TESTING:      MEDICATIONS  (STANDING):  ALBUTerol/ipratropium for Nebulization 3 milliLiter(s) Nebulizer every 6 hours  apixaban 5 milliGRAM(s) Oral every 12 hours  atorvastatin 80 milliGRAM(s) Oral at bedtime  buDESOnide 160 MICROgram(s)/formoterol 4.5 MICROgram(s) Inhaler 2 Puff(s) Inhalation two times a day  diltiazem    milliGRAM(s) Oral daily  docusate sodium 100 milliGRAM(s) Oral three times a day  fentaNYL   Patch  12 MICROgram(s)/Hr 1 Patch Transdermal every 72 hours  fentaNYL   Patch  25 MICROgram(s)/Hr 1 Patch Transdermal every 72 hours  gabapentin 300 milliGRAM(s) Oral three times a day  losartan 50 milliGRAM(s) Oral daily  melatonin 5 milliGRAM(s) Oral at bedtime  multivitamin 1 Tablet(s) Oral daily  pantoprazole    Tablet 40 milliGRAM(s) Oral before breakfast  senna 2 Tablet(s) Oral at bedtime    MEDICATIONS  (PRN):  acetaminophen   Tablet .. 650 milliGRAM(s) Oral every 6 hours PRN Temp greater or equal to 38C (100.4F), Mild Pain (1 - 3)  ALBUTerol    90 MICROgram(s) HFA Inhaler 2 Puff(s) Inhalation every 6 hours PRN for shortness of breath and/or wheezing  ALPRAZolam 0.25 milliGRAM(s) Oral two times a day PRN anxiety  guaiFENesin  milliGRAM(s) Oral every 12 hours PRN congestion  morphine  - Injectable 2 milliGRAM(s) IV Push every 4 hours PRN Severe Pain (7 - 10)  morphine Concentrate 5 milliGRAM(s) Oral every 4 hours PRN pain or dyspnea  ondansetron Injectable 4 milliGRAM(s) IV Push every 6 hours PRN Nausea        FAMILY HISTORY:  No pertinent family history in first degree relatives    Vital Signs Last 24 Hrs  T(C): 36.8 (05 Oct 2019 04:38), Max: 37.1 (04 Oct 2019 21:00)  T(F): 98.2 (05 Oct 2019 04:38), Max: 98.7 (04 Oct 2019 21:00)  HR: 77 (05 Oct 2019 04:38) (55 - 77)  BP: 130/56 (05 Oct 2019 04:38) (110/38 - 130/56)  BP(mean): --  RR: 18 (05 Oct 2019 04:38) (18 - 20)  SpO2: 97% (05 Oct 2019 04:38) (95% - 97%)    PHYSICAL EXAM  General Appearance: cooperative, no acute distress,   HEENT: PERRL, conjunctiva clear, EOM's intact, non injected pharynx, no exudate, TM   normal  Neck: Supple, , no adenopathy, thyroid: not enlarged, no carotid bruit or JVD  Back: Symmetric, no  tenderness,no soft tissue tenderness  Lungs: hyperresonnat to percussion, mild end exp wheeze, scattered lower lobe rhonchi  Heart: Regular rate and rhythm, S1, S2 normal, no murmur, rub or gallop  Abdomen: Soft, non-tender, bowel sounds active , no hepatosplenomegaly  Extremities: no cyanosis or edema, no joint swelling  Skin: Skin color, texture normal, no rashes   Neurologic: Alert and oriented X3 , cranial nerves intact, sensory and motor normal,    ECG:    LABS:                          14.4   60.90 )-----------( 248      ( 03 Oct 2019 02:38 )             43.1     10    139  |  104  |  24<H>  ----------------------------<  131<H>  4.3   |  28  |  0.61    Ca    8.4<L>      03 Oct 2019 02:38    TPro  6.0  /  Alb  3.0<L>  /  TBili  0.6  /  DBili  x   /  AST  25  /  ALT  42  /  AlkPhos  79  10-03    CARDIAC MARKERS ( 03 Oct 2019 06:16 )  <0.015 ng/mL / x     / x     / x     / x      CARDIAC MARKERS ( 03 Oct 2019 02:38 )  <0.015 ng/mL / x     / x     / x     / x                Urinalysis Basic - ( 03 Oct 2019 03:32 )    Color: Yellow / Appearance: Clear / S.010 / pH: x  Gluc: x / Ketone: Negative  / Bili: Negative / Urobili: Negative mg/dL   Blood: x / Protein: Negative mg/dL / Nitrite: Negative   Leuk Esterase: Negative / RBC: x / WBC x   Sq Epi: x / Non Sq Epi: x / Bacteria: x    < from: Xray Chest 1 View-PORTABLE IMMEDIATE (10.03.19 @ 06:30) >  PROCEDURE DATE:  10/03/2019          INTERPRETATION:  History: Chest and abdominal pain    Chest:  one view.      Comparison: 2019    AP radiograph of the chest demonstrates improved LEFT upper lobe   infiltrate with slight worsening of RIGHT lower lobe infiltrate. Probable   tiny LEFT pleural effusion. The cardiac silhouette is normal in size.   Osseous structures are intact.    Impression:improved LEFT upper lobe infiltrate with slight worsening of   RIGHT lower lobe infiltrate. Probable tiny LEFT pleural effusion.    < end of copied text >          RADIOLOGY & ADDITIONAL STUDIES:    < from: CT Abdomen and Pelvis w/ IV Cont (10.03.19 @ 04:57) >  IMPRESSION:    No evidence of small bowel obstruction or active bowel inflammation.   Indeterminate 0.7 cm high density focus inthe gastric lumen. It is   unclear if this is ingested material or an enhancing mass. Correlation   with endoscopy suggested.    1.8 cm indeterminate hypodense lesion in the left hepatic lobe. 1.6 cm   exophytic enhancing lesion arising from the posterior lower pole of the   right kidney suspicious for neoplasm. MRI of the abdomen and pelvis is   suggested for further evaluation.    Small left pleural effusion.    Infrarenal abdominal aortic aneurysm measuring 3 cm.    IMPRESSION:       1. Bilateral lower lobe and right middle lobe segmental and subsegmental   branches not evaluated due to extensive motion artifact. No central or   upper lobe pulmonary embolism.     2. Grossly stable appearance of left suprahilar/mediastinal mass and   spiculated left apical mass. Persistent findings of endobronchial spread   of tumor and/or postobstructive pneumonia in the left upper lobe.    3. Mild interval increase in now moderate size left pleural effusion.

## 2019-10-05 NOTE — PROGRESS NOTE ADULT - ASSESSMENT
1) Small Cell Lung Cancer  2) COPD (Severe, non-compliant with outpatient visits)  3) Pleural Effusion  4) Abnormal CT Chest        84 y/o F w/ PMH of dementia, paroxysmal afib, HLD, anxiety, smoking history / COPD on 02, JOSHUA chest mass/ hilar mass, left supraclavicular node: biopsy small cell cancer,received  a trial of therapy/ can start CARBO ETOPOSIDE x 1 on 10/1 and neuopogen, now presenting with abdominal pain, nv. Pain is diffuse and described as "it feels like there's cement blocks in my belly".  Nausea and dry heaves.  no diarrhea.  no fever.  Pt was just discharged from  in the afternoon on 10/2.    Currently denies any chest pain, sob, fevers, chills.   Abd pain without acute pathology on ct imaging  COPD / Emphysema with mild bronchospasm  hypoxemia with Broad DDX, need to eval fro PE  Afib on AC makes thromboembolic dz less likely  (pt was not sure if she was taking any "blood thinners" as outpt)  Small left sided pleural effusion  leukocytosis due to neupogen response  non toxic / no evidence of sepsis  CT PE reviewed that showed Bilateral lower lobe and right middle lobe segmental and subsegmental   branches not evaluated due to extensive motion artifact. No central or   upper lobe pulmonary embolism. Grossly stable appearance of left suprahilar/mediastinal mass and   spiculated left apical mass. Persistent findings of endobronchial spread of tumor and/or postobstructive pneumonia in the left upper lobe.  Mild interval increase in now moderate size left pleural effusion.  Recommend IR Guided thoracentesis on Monday  Will continue to monitor

## 2019-10-05 NOTE — PROGRESS NOTE ADULT - ASSESSMENT
Lung cancer/ small cell /  limited JOSHUA  s/p chemotherapy x 1  COPD  Abdominal pan / transient\.    A/P    not coping at home by herself  does not get along with sister  no other support at home    rec:    1.minimize medications to avoid polypharmacy  2. consider either short term rehab for 2 weeks ( no chemo or radiation needed for 2 weeks during rehab)  or  home palliative care

## 2019-10-05 NOTE — PROGRESS NOTE ADULT - ASSESSMENT
86 y/o F w/ PMH of dementia, paroxysmal afib, HLD, anxiety, smoking history / COPD on 02, JOSHUA chest mass/ hilar mass, left supraclavicular node: biopsy small cell cancer, received  a trial of therapy/ can start CARBO ETOPOSIDE x 1 on 10/1 and neuopogen, now presenting with abdominal pain, nv. Pain is diffuse and described as "it feels like there's cement blocks in my belly".  Nausea and dry heaves.  no diarrhea.  no fever.  Pt was just discharged from  in the afternoon on 10/2.  States she was well at discharge. Patient on my encounter is somewhat disoriented. She is somehwat sedated at my encounter not able to get very good history.   Currently denies any chest pain, sob, fevers, chills.     * Abdominal pain, and nausea likely steroids induced gastritis   * Gastric lesion on CT vs food   -c/w zofran prn  -can be secondary to recent chemo?  -ct scan noted  - PPI   - o/p PET as per oncology   - GI consult - pet ct as o/p      * Small cell lung CA JOSHUA with left supraclavicular node   - s/p chemo x 1   -received one dose of chemo yesterday and Neupogen  -consult pulmonary, hem/onc, palliative      * Chronic hypoxic respiratory failure, COPD   * Moderate left sided pleural effusion s/p IV hydration  -patient wheezing.   -c/w nebs, pulmicort  -pulmonary consult - will need thoracocentesis   - will need nebs at home   - stop IV fluids, repeat CXR  on Monday am   - trial of IV diuresis   - cardiology consult   - 2 d echo     * Leukocytosis  induced by Neupogen  - no signs of PNA     * afib w/ rvr  - controlled on po cardizem 120   - switch lovenox to eliquis after thoracocentesis    *  HTN  - Cont. losartan  - will hold due to low BP     *  HLD  - Cont. Statin     *  Anxiety  - Cont. xanax PRN BID    * Minimize polypharmacy  - pt has difficulties managing her medications   fentaNYL   Patch  12 MICROgram(s)/Hr 1 Patch Transdermal every 72 hours - will stop   gabapentin 300 milliGRAM(s) Oral three times a day--> bid  stop ketorolac , losartan, IV morphine, MVT    dvt prophy- sc lovenox    SW - needs more support to go home, palliative home care vs short time [facility]     Code status: DNR/DNI- MOLST

## 2019-10-06 LAB
ANION GAP SERPL CALC-SCNC: 5 MMOL/L — SIGNIFICANT CHANGE UP (ref 5–17)
BUN SERPL-MCNC: 11 MG/DL — SIGNIFICANT CHANGE UP (ref 7–23)
CALCIUM SERPL-MCNC: 8 MG/DL — LOW (ref 8.5–10.1)
CHLORIDE SERPL-SCNC: 103 MMOL/L — SIGNIFICANT CHANGE UP (ref 96–108)
CO2 SERPL-SCNC: 30 MMOL/L — SIGNIFICANT CHANGE UP (ref 22–31)
CREAT SERPL-MCNC: 0.48 MG/DL — LOW (ref 0.5–1.3)
GLUCOSE SERPL-MCNC: 99 MG/DL — SIGNIFICANT CHANGE UP (ref 70–99)
HCT VFR BLD CALC: 31.6 % — LOW (ref 34.5–45)
HGB BLD-MCNC: 10.5 G/DL — LOW (ref 11.5–15.5)
MAGNESIUM SERPL-MCNC: 2.2 MG/DL — SIGNIFICANT CHANGE UP (ref 1.6–2.6)
MCHC RBC-ENTMCNC: 31.8 PG — SIGNIFICANT CHANGE UP (ref 27–34)
MCHC RBC-ENTMCNC: 33.2 GM/DL — SIGNIFICANT CHANGE UP (ref 32–36)
MCV RBC AUTO: 95.8 FL — SIGNIFICANT CHANGE UP (ref 80–100)
PHOSPHATE SERPL-MCNC: 2.9 MG/DL — SIGNIFICANT CHANGE UP (ref 2.5–4.5)
PLATELET # BLD AUTO: 116 K/UL — LOW (ref 150–400)
POTASSIUM SERPL-MCNC: 3.5 MMOL/L — SIGNIFICANT CHANGE UP (ref 3.5–5.3)
POTASSIUM SERPL-SCNC: 3.5 MMOL/L — SIGNIFICANT CHANGE UP (ref 3.5–5.3)
RBC # BLD: 3.3 M/UL — LOW (ref 3.8–5.2)
RBC # FLD: 13.1 % — SIGNIFICANT CHANGE UP (ref 10.3–14.5)
SODIUM SERPL-SCNC: 138 MMOL/L — SIGNIFICANT CHANGE UP (ref 135–145)
TROPONIN I SERPL-MCNC: 0.02 NG/ML — SIGNIFICANT CHANGE UP (ref 0.01–0.04)
TROPONIN I SERPL-MCNC: <0.015 NG/ML — SIGNIFICANT CHANGE UP (ref 0.01–0.04)
TROPONIN I SERPL-MCNC: <0.015 NG/ML — SIGNIFICANT CHANGE UP (ref 0.01–0.04)
WBC # BLD: 10.69 K/UL — HIGH (ref 3.8–10.5)
WBC # FLD AUTO: 10.69 K/UL — HIGH (ref 3.8–10.5)

## 2019-10-06 PROCEDURE — 93010 ELECTROCARDIOGRAM REPORT: CPT

## 2019-10-06 PROCEDURE — 71045 X-RAY EXAM CHEST 1 VIEW: CPT | Mod: 26

## 2019-10-06 RX ORDER — ASPIRIN/CALCIUM CARB/MAGNESIUM 324 MG
81 TABLET ORAL ONCE
Refills: 0 | Status: COMPLETED | OUTPATIENT
Start: 2019-10-06 | End: 2019-10-06

## 2019-10-06 RX ADMIN — ATORVASTATIN CALCIUM 80 MILLIGRAM(S): 80 TABLET, FILM COATED ORAL at 21:22

## 2019-10-06 RX ADMIN — GABAPENTIN 300 MILLIGRAM(S): 400 CAPSULE ORAL at 06:10

## 2019-10-06 RX ADMIN — MORPHINE SULFATE 5 MILLIGRAM(S): 50 CAPSULE, EXTENDED RELEASE ORAL at 23:52

## 2019-10-06 RX ADMIN — BUDESONIDE AND FORMOTEROL FUMARATE DIHYDRATE 2 PUFF(S): 160; 4.5 AEROSOL RESPIRATORY (INHALATION) at 21:28

## 2019-10-06 RX ADMIN — FENTANYL CITRATE 1 PATCH: 50 INJECTION INTRAVENOUS at 14:05

## 2019-10-06 RX ADMIN — Medication 0.25 MILLIGRAM(S): at 03:26

## 2019-10-06 RX ADMIN — Medication 20 MILLIGRAM(S): at 13:02

## 2019-10-06 RX ADMIN — Medication 30 MILLILITER(S): at 23:52

## 2019-10-06 RX ADMIN — Medication 3 MILLILITER(S): at 21:27

## 2019-10-06 RX ADMIN — POLYETHYLENE GLYCOL 3350 17 GRAM(S): 17 POWDER, FOR SOLUTION ORAL at 13:02

## 2019-10-06 RX ADMIN — ENOXAPARIN SODIUM 60 MILLIGRAM(S): 100 INJECTION SUBCUTANEOUS at 06:11

## 2019-10-06 RX ADMIN — Medication 100 MILLIGRAM(S): at 13:02

## 2019-10-06 RX ADMIN — Medication 81 MILLIGRAM(S): at 07:41

## 2019-10-06 RX ADMIN — BUDESONIDE AND FORMOTEROL FUMARATE DIHYDRATE 2 PUFF(S): 160; 4.5 AEROSOL RESPIRATORY (INHALATION) at 08:57

## 2019-10-06 RX ADMIN — Medication 40 MILLIGRAM(S): at 13:17

## 2019-10-06 RX ADMIN — Medication 100 MILLIGRAM(S): at 06:11

## 2019-10-06 RX ADMIN — Medication 3 MILLILITER(S): at 13:15

## 2019-10-06 RX ADMIN — Medication 600 MILLIGRAM(S): at 18:15

## 2019-10-06 RX ADMIN — PANTOPRAZOLE SODIUM 40 MILLIGRAM(S): 20 TABLET, DELAYED RELEASE ORAL at 06:15

## 2019-10-06 RX ADMIN — Medication 100 MILLIGRAM(S): at 21:22

## 2019-10-06 RX ADMIN — GABAPENTIN 300 MILLIGRAM(S): 400 CAPSULE ORAL at 18:12

## 2019-10-06 RX ADMIN — Medication 3 MILLILITER(S): at 09:00

## 2019-10-06 RX ADMIN — Medication 5 MILLIGRAM(S): at 21:23

## 2019-10-06 RX ADMIN — MORPHINE SULFATE 5 MILLIGRAM(S): 50 CAPSULE, EXTENDED RELEASE ORAL at 01:26

## 2019-10-06 RX ADMIN — FENTANYL CITRATE 1 PATCH: 50 INJECTION INTRAVENOUS at 20:25

## 2019-10-06 RX ADMIN — FENTANYL CITRATE 1 PATCH: 50 INJECTION INTRAVENOUS at 10:09

## 2019-10-06 RX ADMIN — Medication 120 MILLIGRAM(S): at 13:02

## 2019-10-06 RX ADMIN — SENNA PLUS 2 TABLET(S): 8.6 TABLET ORAL at 21:22

## 2019-10-06 NOTE — CONSULT NOTE ADULT - ASSESSMENT
110
84 y/o F w/ PMH of dementia, paroxysmal afib, HLD, anxiety, smoking history / COPD on 02, JOSHUA chest mass/ hilar mass, left supraclavicular node: biopsy small cell cancer,received  a trial of therapy/ can start CARBO ETOPOSIDE x 1 on 10/1 and neuopogen, now presenting with abdominal pain, nv. Pain is diffuse and described as "it feels like there's cement blocks in my belly".  Nausea and dry heaves.  no diarrhea.  no fever.  Pt was just discharged from  in the afternoon on 10/2.    Currently denies any chest pain, sob, fevers, chills.     seen and eval today  data discussed with Dr Powers and Dr Giles  no hemoptysis  no pleuritic pain  some cough    assessment/plan:  metastatic Small cell lung cancer  recent admit with JOSHUA pneumonia  Abd pain without acute pathology on ct imaging  COPD / Emphysema with mild bronchospasm  hypoxemia with Broad DDX, need to eval fro PE  Afib on AC makes thromboembolic dz less likely  (pt was not sure if she was taking any "blood thinners" as outpt)  Small left sided pleural effusion  leukocytosis due to neupogen response  non toxic / no evidence of sepsis      CTA, r/o PE protocol now  agree with bronchodilator rx  supplemental O2  steroids if residual bronchospasm  (on hold for now with abd pain eval)  antibiotics if residual infiltrate  Dr Amador is covering 10/5-10/6
84 y/o F w/ PMH of dementia, paroxysmal afib, HLD, anxiety, smoking history / COPD on 02, JOSHUA chest mass/ hilar mass, left supraclavicular node: biopsy small cell cancer,received  a trial of therapy/ can start CARBO ETOPOSIDE x 1 on 10/1 and neuopogen, now presenting with abdominal pain, nv. Pain is diffuse and described as "it feels like there's cement blocks in my belly".  Nausea and dry heaves.  no diarrhea.  no fever.  Pt was just discharged from  in the afternoon on 10/2.  States she was well at discharge. Patient on my encounter is somewhat disoriented. She is somehwat sedated at my encounter not able to get very good history.   Currently denies any chest pain, sob, fevers, chills.       abd pain in upper area  PPI and hot packs to abd wall for possible gastritis and component of musculoskeletal pain  cont zofran    lung ca with mets    gastric lesion on CT and will check out pt PET scan      A fib     DW oncology
85 year old woman with the above history including a recent start of chemotherapy for her lung cancer admitted with GI symptoms possibly related to the chemotherapy.  Her Xray abnormalities are most likely related to the cancer.  Her cardiac status(BP, cardiac exam, EKG and Liam) is stable.  Will discuss with Dr Tay/Chan
85y old Female coming from home with hx of COPD, Left Lung mass (biopsied on 9/16 found to be metastatic small cell carcinoma), Afib, RA previously on immunosuppressant tx, HLD, HTN, recently admitted 9/ for above dx and again 9/25 until yesterday for worsening of shortness of breath w/ associated with palpitation. Found here to have COPD exacerbation c/b rapid afib (the latter resolved). No readmitted 10/3 for c/o abd pain and n/v, with concern for side effect from recent initiation of chemo. Found to have leukocytosis, likely due to neupogen, and CT ruling out obstruction but showing high density focus in lumen. CXR also showing infiltrate bl. Palliative Care consulted to assist with establishing GOC, known to our service from last admission (yesterday).     1) Pain  - likely neuropathic pain from underlying metastatic disease with ayse involvement c/b possible reflux/chemo side effect  - c/w gabapentin (already at a reasonable dose)  - c/w IV morphine 2mg q4h prn, with understanding that 4mg q4h prn dose was also needed in the past and thus can be added if 2mg dose insufficient  - plan to replace Roxanol 5mg q4h prn for longer-lasting relief when able to tolerate po  - c/w fentanyl 37mcg (pt not confused, remains eccentric like always and tolerated this dose without issue since 9/30)    2) Nausea and vomiting  - imaging ruling out obstruction but seeing ?mass  - n/v may also be side effect of newly initiated chemo  - c/w zofran  - ativan can both treat anxiety and may also have added effect of helping with this  - c/w PPI given description of sx suggesting GERD    3) Anxiety  - since n/v remains  - instead of xanax po added IV ativan 0.25mg q6h prn  - when n/v resolves will add back 0.25mg bid of xanax  - anxiety likely also exacerbating perception of sx    4) chronic resp failure  - multifactorial- likely due to COPD exacerbation atop new lung cancer and now CXR showing infiltrates  - c/w nebs and bronchodilators  - c/w supplemental O2  - discussed the usefulness of opioids for air hunger with pt as well, may use morphine for this as well  - pulm consult ordered    5) Metastatic small cell ca  - reviewed onc impression from last admission  - was considering palliative chemo (Dr. Campbell) and RT (Dr. Raya) 2 admissions ago and agreed to initiating chemo with Dr. Tay  - seemed to have tolerated this well  - though last dose 10/1 and sx may be delayed side effect  - await onc consult tomorrow cc; pt's interest in continuing, which seems unlikely at this point    6) Debility  - quite independent before this, but admits to walking with walker and her family being concerned about fall  - PPS 40-50% now due to sx  - evidence of malnutrition  - recommend nutrition/dietary consult when n/v resolves)    7) GOC/Advanced Directives  - pt has capacity for decision making, is clear about her feelings, and reasonable enough to consider other opinions  - HCP on file naming her sister Lety Cardenas [phone]  - MOLST revised 9/30: DNR, limited medical interventions, DNI, send to hospital, no feeding tube, trial of IVF, determine use of abx  - GOC meeting held with pt and sister 9/30- plan to c/w cancer treatment and return home when deemed medically stable with home palliative care services ([facility]). Will await pt's conversation with onc cc; desire to continue and if consistent with likely decision to forgo further treatments will again broach option of hospice, which may need to be inpt given severity of pt's sx. Will return tomorrow to reassess.    Thank you for including us in Ms. Juarez's care. Will continue to follow with you.    Douglas Mcclure MD  Palliative Care Attending
Lung cancer/ small cell /  limited JOSHUA  s/p chemotherapy x 1  COPD  Abdominal pan / transient\.    A/P    Continue medical management  O2  Palliative evaluating

## 2019-10-06 NOTE — CHART NOTE - NSCHARTNOTEFT_GEN_A_CORE
I was called about chest pain. Patient reports she had constant chest pain throughout the night (5+ hours). Chest pain was substernal, tight/gripping, nonradiating, nonpositional, 4/10. Pt. denied the pain was worsening. Pt reported her baseline dyspnea did not worsen with the pain. Patient reports that the pain has been associated with belching. Patient denied feeling light headed. Stat EKG revealed normal Sinus rhythm. Vitals were WNL.     Vital Signs Last 24 Hrs  T(C): 36.4 (06 Oct 2019 05:12), Max: 36.9 (05 Oct 2019 21:53)  T(F): 97.5 (06 Oct 2019 05:12), Max: 98.4 (05 Oct 2019 21:53)  HR: 84 (06 Oct 2019 05:12) (70 - 100)  BP: 109/48 (06 Oct 2019 05:12) (97/61 - 114/71)  RR: 18 (06 Oct 2019 05:12) (18 - 18)  SpO2: 98% (06 Oct 2019 05:12) (97% - 100%)      A/P   Pt 80 year old female with a-fib, lung cancer who started experiencing chest pain is being evaluated for rule out ACS.      Chest pain secondary indigestion vs ACS   -EKG WNL  -Asprin stat  -Ordered Trop x3.  -ordered Am bmp, mg, phos   -Cont. to monitor vitals and clinical picture       D/W Dr.De Soto and Dr. Manuel I was called about chest pain. Patient reports she had constant chest pain throughout the night (5+ hours). Chest pain was substernal, tight/gripping, nonradiating, nonpositional, 4/10. Pt. denied the pain was worsening. Pt reported her baseline dyspnea did not worsen with the pain. Patient reports that the pain has been associated with belching. Patient denied feeling light headed. Stat EKG revealed normal Sinus rhythm. Vitals were WNL.     Vital Signs Last 24 Hrs  T(C): 36.4 (06 Oct 2019 05:12), Max: 36.9 (05 Oct 2019 21:53)  T(F): 97.5 (06 Oct 2019 05:12), Max: 98.4 (05 Oct 2019 21:53)  HR: 84 (06 Oct 2019 05:12) (70 - 100)  BP: 109/48 (06 Oct 2019 05:12) (97/61 - 114/71)  RR: 18 (06 Oct 2019 05:12) (18 - 18)  SpO2: 98% (06 Oct 2019 05:12) (97% - 100%)      A/P   Pt 80 year old female with a-fib, lung cancer who started experiencing chest pain is being evaluated for rule out ACS.      Physical Exam   Gen: NAD, slightly irritable  HENT: atraumatic head and ears, no gross abnormalities of ears, mucous membranes moist,   CV: RRR, nl s1/s2, no M/R/G  Pulm: nl respiratory effort, CTAB, no wheezes/crackles/rhonchi  Abd: normoactive bowel sounds, soft, nontender, nondistended,   Extremities: no pedal edema, pedal pulses palpable   Skin: nl warm and dry, no wounds   Neuro: A&Ox3, answering questions appropriately, able to follow directions        Chest pain secondary indigestion vs ACS(unlikely)   -EKG WNL  -Asprin stat  -Ordered Trop x3.  -ordered Am bmp, mg, phos   -Cont. to monitor vitals and clinical picture       D/W Dr.De Soto and Dr. Manuel

## 2019-10-06 NOTE — PROGRESS NOTE ADULT - ASSESSMENT
86 y/o F w/ PMH of dementia, paroxysmal afib, HLD, anxiety, smoking history / COPD on 02, JOSHUA chest mass/ hilar mass, left supraclavicular node: biopsy small cell cancer, received  a trial of therapy/ can start CARBO ETOPOSIDE x 1 on 10/1 and neuopogen, now presenting with abdominal pain, nv. Pain is diffuse and described as "it feels like there's cement blocks in my belly".  Nausea and dry heaves.  no diarrhea.  no fever.  Pt was just discharged from  in the afternoon on 10/2.  States she was well at discharge. Patient on my encounter is somewhat disoriented. She is somehwat sedated at my encounter not able to get very good history.   Currently denies any chest pain, sob, fevers, chills.     * Abdominal pain, and nausea likely steroids induced gastritis   * Gastric lesion on CT vs food   -c/w zofran prn  -can be secondary to recent chemo?  -ct scan noted  - PPI   - o/p PET as per oncology   - GI consult - pet ct as o/p      * Small cell lung CA JOSHUA with left supraclavicular node   - s/p chemo x 1   -received one dose of chemo yesterday and Neupogen  -consult pulmonary, hem/onc, palliative      * Chronic hypoxic respiratory failure, COPD   * Moderate left sided pleural effusion s/p IV hydration  -patient wheezing.   -c/w nebs, pulmicort  -pulmonary consult - will need thoracocentesis   - will need nebs at home   - stop IV fluids, repeat CXR today   - trial of IV diuresis  lasix  - cardiology consult   - 2 d echo   - IR consult for right thoracocentesis as per pulmonology recs    * Leukocytosis  induced by Neupogen  - no signs of PNA     * afib w/ rvr  - controlled on po cardizem 120   - switch lovenox to eliquis after thoracocentesis    *  HTN  - Cont. losartan  - will hold due to low BP     *  HLD  - Cont. Statin     *  Anxiety  - Cont. xanax PRN BID    * Minimize polypharmacy  - pt has difficulties managing her medications   fentaNYL   Patch  12 MICROgram(s)/Hr 1 Patch Transdermal every 72 hours - will stop   gabapentin 300 milliGRAM(s) Oral three times a day--> bid  stop ketorolac , losartan, IV morphine, MVT    dvt prophy- sc lovenox    SW - needs more support to go home, palliative home care vs short time [facility]  , PT evaluation  as per oncology - ok to hold chemo and radiation while in [facility]     Code status: DNR/DNI- MOLST 84 y/o F w/ PMH of dementia, paroxysmal afib, HLD, anxiety, smoking history / COPD on 02, JOSHUA chest mass/ hilar mass, left supraclavicular node: biopsy small cell cancer, received  a trial of therapy/ can start CARBO ETOPOSIDE x 1 on 10/1 and neuopogen, now presenting with abdominal pain, nv. Pain is diffuse and described as "it feels like there's cement blocks in my belly".  Nausea and dry heaves.  no diarrhea.  no fever.  Pt was just discharged from  in the afternoon on 10/2.  States she was well at discharge. Patient on my encounter is somewhat disoriented. She is somehwat sedated at my encounter not able to get very good history.   Currently denies any chest pain, sob, fevers, chills.     * Abdominal pain, and nausea likely steroids induced gastritis   * Gastric lesion on CT vs food   -c/w zofran prn  -can be secondary to recent chemo?  -ct scan noted  - PPI   - o/p PET as per oncology   - GI consult - pet ct as o/p      * Small cell lung CA JOSHUA with left supraclavicular node   - s/p chemo x 1   -received one dose of chemo yesterday and Neupogen  -consult pulmonary, hem/onc, palliative      * Chronic hypoxic respiratory failure, COPD   * Moderate left sided pleural effusion s/p IV hydration  -patient wheezing.   -c/w nebs, pulmicort  -pulmonary consult - will need thoracocentesis   - will need nebs at home   - stop IV fluids, repeat CXR today   - trial of IV diuresis  lasix  - cardiology consult   - 2 d echo   - IR consult for right thoracocentesis as per pulmonology recs  - start IV low dose steroids for wheezing 10/6/19     * Leukocytosis  induced by Neupogen  - no signs of PNA     * afib w/ rvr  - controlled on po cardizem 120   - switch lovenox to eliquis after thoracocentesis    *  HTN  - Cont. losartan  - will hold due to low BP     *  HLD  - Cont. Statin     *  Anxiety  - Cont. xanax PRN BID    * Minimize polypharmacy  - pt has difficulties managing her medications   fentaNYL   Patch  12 MICROgram(s)/Hr 1 Patch Transdermal every 72 hours - will stop   gabapentin 300 milliGRAM(s) Oral three times a day--> bid  stop ketorolac , losartan, IV morphine, MVT    dvt prophy- sc lovenox    SW - needs more support to go home, palliative home care vs short time [facility]  , PT evaluation  as per oncology - ok to hold chemo and radiation while in [facility]     Code status: DNR/DNI- MOLST contact guard

## 2019-10-06 NOTE — CONSULT NOTE ADULT - SUBJECTIVE AND OBJECTIVE BOX
CHIEF COMPLAINT: Patient is a 85y old  Female who presents with a chief complaint of cancer, nausea, abdominal pain, vommtting (06 Oct 2019 07:24)      HPI: HPI:  86 y/o F w/ PMH of dementia, paroxysmal afib, HLD, anxiety, smoking history / COPD on 02, JOSHUA chest mass/ hilar mass, left supraclavicular node: biopsy small cell cancer,received  a trial of therapy/ can start CARBO ETOPOSIDE x 1 on 10/1 and neuopogen, now presenting with abdominal pain, nv. Pain is diffuse and described as "it feels like there's cement blocks in my belly".  Nausea and dry heaves.  no diarrhea.  no fever.  Pt was just discharged from  in the afternoon on 10/2.  States she was well at discharge. Patient on my encounter is somewhat disoriented. She is somehwat sedated at my encounter not able to get very good history.   Currently denies any chest pain, sob, fevers, chills. (03 Oct 2019 08:17)      PMHx: PAST MEDICAL & SURGICAL HISTORY:  Rheumatoid arthritis  Lung mass  HLD (hyperlipidemia)  HTN (hypertension)  COPD (chronic obstructive pulmonary disease)  Carotid stent occlusion, initial encounter  H/O hernia repair  Ectopic thyroid tissue  Paroxysmal atrial fibrillation        Soc Hx:     FAMILY HISTORY:  No pertinent family history in first degree relatives      Allergies: Allergies    No Known Allergies    Intolerances          REVIEW OF SYSTEMS:    As above  No chest pain or shortness of breath  No lightheadeness or syncope  No leg swelling  No palpitations  No claudication-like symptoms    Vital Signs Last 24 Hrs  T(C): 36.4 (06 Oct 2019 05:12), Max: 36.9 (05 Oct 2019 21:53)  T(F): 97.5 (06 Oct 2019 05:12), Max: 98.4 (05 Oct 2019 21:53)  HR: 74 (06 Oct 2019 09:01) (74 - 100)  BP: 109/48 (06 Oct 2019 05:12) (97/61 - 114/71)  BP(mean): --  RR: 18 (06 Oct 2019 05:12) (18 - 18)  SpO2: 98% (06 Oct 2019 09:01) (98% - 100%)    I&O's Summary      CAPILLARY BLOOD GLUCOSE          PHYSICAL EXAM:   Patient in NAD  Neck: No JVD; Carotids:  2+ without bruits  Respiratory:  Clear to A&P  Cardiovascular: S1 and S2, regular rate and rhythm, no Murmurs, gallops or rubs  Gastrointestinal:  Soft, non-tender; BS positive  Extremities: No peripheral edema  Vascular: 2+ peripheral pulses  Neurological: A/O x 3, no focal deficits      MEDICATIONS:  MEDICATIONS  (STANDING):  ALBUTerol/ipratropium for Nebulization 3 milliLiter(s) Nebulizer every 6 hours  atorvastatin 80 milliGRAM(s) Oral at bedtime  buDESOnide 160 MICROgram(s)/formoterol 4.5 MICROgram(s) Inhaler 2 Puff(s) Inhalation two times a day  diltiazem    milliGRAM(s) Oral daily  docusate sodium 100 milliGRAM(s) Oral three times a day  enoxaparin Injectable 60 milliGRAM(s) SubCutaneous every 12 hours  fentaNYL   Patch  25 MICROgram(s)/Hr 1 Patch Transdermal every 72 hours  furosemide   Injectable 20 milliGRAM(s) IV Push daily  gabapentin 300 milliGRAM(s) Oral every 12 hours  melatonin 5 milliGRAM(s) Oral at bedtime  pantoprazole    Tablet 40 milliGRAM(s) Oral before breakfast  polyethylene glycol 3350 17 Gram(s) Oral daily  senna 2 Tablet(s) Oral at bedtime      LABS: All Labs Reviewed:  Blood Culture:   BNP Serum Pro-Brain Natriuretic Peptide: 655 pg/mL (10-05 @ 06:22)    CBC             WBC Count: 10.69 K/uL (10-06 @ 07:22)              Hemoglobin: 10.5 g/dL (10-06 @ 07:22)  Hemoglobin: 11.5 g/dL (10-05 @ 06:22)  Hemoglobin: 13.1 g/dL (10-04 @ 08:15)  Hemoglobin: 14.4 g/dL (10-03 @ 02:38)              Hematocrit: 31.6 % (10-06 @ 07:22)  Hematocrit: 35.3 % (10-05 @ 06:22)  Hematocrit: 40.7 % (10-04 @ 08:15)  Hematocrit: 43.1 % (10-03 @ 02:38)              Mean Cell Volume: 95.8 fl (10-06 @ 07:22)  Mean Cell Volume: 97.8 fl (10-05 @ 06:22)  Mean Cell Volume: 98.8 fl (10-04 @ 08:15)  Mean Cell Volume: 94.9 fl (10-03 @ 02:38)              Platelet Count - Automated: 116 K/uL (10-06 @ 07:22)  Platelet Count - Automated: 141 K/uL (10-05 @ 06:22)  Platelet Count - Automated: 177 K/uL (10-04 @ 08:15)  Platelet Count - Automated: 248 K/uL (10-03 @ 02:38)                            Cardiac markers             Troponin I, Serum: <0.015 ng/mL (10-06 @ 09:15)  Troponin I, Serum: 0.018 ng/mL (10-06 @ 07:22)  Troponin I, Serum: <0.015 ng/mL (10-03 @ 06:16)  Troponin I, Serum: <0.015 ng/mL (10-03 @ 02:38)                             Chems        Sodium, Serum: 138 mmol/L (10-06 @ 07:22)  Sodium, Serum: 139 mmol/L (10-05 @ 06:22)  Sodium, Serum: 140 mmol/L (10-04 @ 08:15)  Sodium, Serum: 139 mmol/L (10-03 @ 02:38)          Potassium, Serum: 3.5 mmol/L (10-06 @ 07:22)  Potassium, Serum: 3.5 mmol/L (10-05 @ 06:22)  Potassium, Serum: 3.6 mmol/L (10-04 @ 08:15)  Potassium, Serum: 4.3 mmol/L (10-03 @ 02:38)          Blood Urea Nitrogen, Serum: 11 mg/dL (10-06 @ 07:22)  Blood Urea Nitrogen, Serum: 12 mg/dL (10-05 @ 06:22)  Blood Urea Nitrogen, Serum: 17 mg/dL (10-04 @ 08:15)  Blood Urea Nitrogen, Serum: 24 mg/dL (10-03 @ 02:38)          Creatinine 0.48  Creatinine 0.49  Creatinine 0.60  Creatinine 0.61          Magnesium, Serum: 2.2 mg/dL (10-06 @ 07:22)          Protein Total, Serum: 6.0 gm/dL (10-03 @ 02:38)                  Calcium, Total Serum: 8.0 mg/dL (10-06 @ 07:22)  Calcium, Total Serum: 7.7 mg/dL (10-05 @ 06:22)  Calcium, Total Serum: 7.9 mg/dL (10-04 @ 08:15)  Calcium, Total Serum: 8.4 mg/dL (10-03 @ 02:38)          Phosphorus Level, Serum: 2.9 mg/dL (10-06 @ 07:22)          Bilirubin Total, Serum: 0.6 mg/dL (10-03 @ 02:38)          Alanine Aminotransferase (ALT/SGPT): 42 U/L (10-03 @ 02:38)          Aspartate Aminotransferase (AST/SGOT): 25 U/L (10-03 @ 02:38)                            RADIOLOGY:    EKG:    Telemetry:    ECHO:

## 2019-10-06 NOTE — PROGRESS NOTE ADULT - ASSESSMENT
1) Small Cell Lung Cancer  2) COPD (Severe, non-compliant with outpatient visits)  3) Pleural Effusion  4) Abnormal CT Chest        86 y/o F w/ PMH of dementia, paroxysmal afib, HLD, anxiety, smoking history / COPD on 02, JOSHUA chest mass/ hilar mass, left supraclavicular node: biopsy small cell cancer,received  a trial of therapy/ can start CARBO ETOPOSIDE x 1 on 10/1 and neuopogen, now presenting with abdominal pain, nv. Pain is diffuse and described as "it feels like there's cement blocks in my belly".  Nausea and dry heaves.  no diarrhea.  no fever.  Pt was just discharged from  in the afternoon on 10/2.    Currently denies any chest pain, sob, fevers, chills.   Abd pain without acute pathology on ct imaging  COPD / Emphysema with mild bronchospasm  hypoxemia with Broad DDX, need to eval fro PE  Afib on AC makes thromboembolic dz less likely  (pt was not sure if she was taking any "blood thinners" as outpt)  Small left sided pleural effusion  leukocytosis due to neupogen response  non toxic / no evidence of sepsis  CT PE reviewed that showed Bilateral lower lobe and right middle lobe segmental and subsegmental   branches not evaluated due to extensive motion artifact. No central or   upper lobe pulmonary embolism. Grossly stable appearance of left suprahilar/mediastinal mass and   spiculated left apical mass. Persistent findings of endobronchial spread of tumor and/or postobstructive pneumonia in the left upper lobe.  Mild interval increase in now moderate size left pleural effusion.  Recommend IR Guided thoracentesis if diuresis not improving effusion; follow up CXR  Will continue to monitor

## 2019-10-06 NOTE — PROGRESS NOTE ADULT - SUBJECTIVE AND OBJECTIVE BOX
Patient is a 85y old  Female who presents with a chief complaint of cancer, nausea, abdominal pain, vommtting (04 Oct 2019 07:57)      HPI:  84 y/o F w/ PMH of dementia, paroxysmal afib, HLD, anxiety, smoking history / COPD on , JOSHUA chest mass/ hilar mass, left supraclavicular node: biopsy small cell cancer,received  a trial of therapy/ can start CARBO ETOPOSIDE x 1 on 10/1 and neuopogen, now presenting with abdominal pain, nv. Pain is diffuse and described as "it feels like there's cement blocks in my belly".  Nausea and dry heaves.  no diarrhea.  no fever.  Pt was just discharged from  in the afternoon on 10/2.  States she was well at discharge. Patient on my encounter is somewhat disoriented. She is somehwat sedated at my encounter not able to get very good history.   Currently denies any chest pain, sob, fevers, chills. (03 Oct 2019 08:17)  seen and eval today  data discussed with Dr Powers and Dr Giles  no hemoptysis  no pleuritic pain  some cough    10/6  No acute pulmonary events occurred overnight     PAST MEDICAL & SURGICAL HISTORY:  Rheumatoid arthritis  Lung mass  HLD (hyperlipidemia)  HTN (hypertension)  COPD (chronic obstructive pulmonary disease)  Carotid stent occlusion, initial encounter  H/O hernia repair  Ectopic thyroid tissue      PREVIOUS DIAGNOSTIC TESTING:      MEDICATIONS  (STANDING):  ALBUTerol/ipratropium for Nebulization 3 milliLiter(s) Nebulizer every 6 hours  apixaban 5 milliGRAM(s) Oral every 12 hours  atorvastatin 80 milliGRAM(s) Oral at bedtime  buDESOnide 160 MICROgram(s)/formoterol 4.5 MICROgram(s) Inhaler 2 Puff(s) Inhalation two times a day  diltiazem    milliGRAM(s) Oral daily  docusate sodium 100 milliGRAM(s) Oral three times a day  fentaNYL   Patch  12 MICROgram(s)/Hr 1 Patch Transdermal every 72 hours  fentaNYL   Patch  25 MICROgram(s)/Hr 1 Patch Transdermal every 72 hours  gabapentin 300 milliGRAM(s) Oral three times a day  losartan 50 milliGRAM(s) Oral daily  melatonin 5 milliGRAM(s) Oral at bedtime  multivitamin 1 Tablet(s) Oral daily  pantoprazole    Tablet 40 milliGRAM(s) Oral before breakfast  senna 2 Tablet(s) Oral at bedtime    MEDICATIONS  (PRN):  acetaminophen   Tablet .. 650 milliGRAM(s) Oral every 6 hours PRN Temp greater or equal to 38C (100.4F), Mild Pain (1 - 3)  ALBUTerol    90 MICROgram(s) HFA Inhaler 2 Puff(s) Inhalation every 6 hours PRN for shortness of breath and/or wheezing  ALPRAZolam 0.25 milliGRAM(s) Oral two times a day PRN anxiety  guaiFENesin  milliGRAM(s) Oral every 12 hours PRN congestion  morphine  - Injectable 2 milliGRAM(s) IV Push every 4 hours PRN Severe Pain (7 - 10)  morphine Concentrate 5 milliGRAM(s) Oral every 4 hours PRN pain or dyspnea  ondansetron Injectable 4 milliGRAM(s) IV Push every 6 hours PRN Nausea        Vital Signs Last 24 Hrs  T(C): 36.4 (06 Oct 2019 05:12), Max: 36.9 (05 Oct 2019 21:53)  T(F): 97.5 (06 Oct 2019 05:12), Max: 98.4 (05 Oct 2019 21:53)  HR: 84 (06 Oct 2019 05:12) (70 - 100)  BP: 109/48 (06 Oct 2019 05:12) (97/61 - 114/71)  BP(mean): --  RR: 18 (06 Oct 2019 05:12) (18 - 18)  SpO2: 98% (06 Oct 2019 05:12) (97% - 100%)    PHYSICAL EXAM  General Appearance: cooperative, no acute distress,   HEENT: PERRL, conjunctiva clear, EOM's intact, non injected pharynx, no exudate, TM   normal  Neck: Supple, , no adenopathy, thyroid: not enlarged, no carotid bruit or JVD  Back: Symmetric, no  tenderness,no soft tissue tenderness  Lungs: hyperresonnat to percussion, mild end exp wheeze, scattered lower lobe rhonchi  Heart: Regular rate and rhythm, S1, S2 normal, no murmur, rub or gallop  Abdomen: Soft, non-tender, bowel sounds active , no hepatosplenomegaly  Extremities: no cyanosis or edema, no joint swelling  Skin: Skin color, texture normal, no rashes   Neurologic: Alert and oriented X3 , cranial nerves intact, sensory and motor normal,    ECG:    LABS:                          14.4   60.90 )-----------( 248      ( 03 Oct 2019 02:38 )             43.1     10    139  |  104  |  24<H>  ----------------------------<  131<H>  4.3   |  28  |  0.61    Ca    8.4<L>      03 Oct 2019 02:38    TPro  6.0  /  Alb  3.0<L>  /  TBili  0.6  /  DBili  x   /  AST  25  /  ALT  42  /  AlkPhos  79  10    CARDIAC MARKERS ( 03 Oct 2019 06:16 )  <0.015 ng/mL / x     / x     / x     / x      CARDIAC MARKERS ( 03 Oct 2019 02:38 )  <0.015 ng/mL / x     / x     / x     / x                Urinalysis Basic - ( 03 Oct 2019 03:32 )    Color: Yellow / Appearance: Clear / S.010 / pH: x  Gluc: x / Ketone: Negative  / Bili: Negative / Urobili: Negative mg/dL   Blood: x / Protein: Negative mg/dL / Nitrite: Negative   Leuk Esterase: Negative / RBC: x / WBC x   Sq Epi: x / Non Sq Epi: x / Bacteria: x    < from: Xray Chest 1 View-PORTABLE IMMEDIATE (10.03.19 @ 06:30) >  PROCEDURE DATE:  10/03/2019          INTERPRETATION:  History: Chest and abdominal pain    Chest:  one view.      Comparison: 2019    AP radiograph of the chest demonstrates improved LEFT upper lobe   infiltrate with slight worsening of RIGHT lower lobe infiltrate. Probable   tiny LEFT pleural effusion. The cardiac silhouette is normal in size.   Osseous structures are intact.    Impression:improved LEFT upper lobe infiltrate with slight worsening of   RIGHT lower lobe infiltrate. Probable tiny LEFT pleural effusion.    < end of copied text >          RADIOLOGY & ADDITIONAL STUDIES:    < from: CT Abdomen and Pelvis w/ IV Cont (10.03.19 @ 04:57) >  IMPRESSION:    No evidence of small bowel obstruction or active bowel inflammation.   Indeterminate 0.7 cm high density focus inthe gastric lumen. It is   unclear if this is ingested material or an enhancing mass. Correlation   with endoscopy suggested.    1.8 cm indeterminate hypodense lesion in the left hepatic lobe. 1.6 cm   exophytic enhancing lesion arising from the posterior lower pole of the   right kidney suspicious for neoplasm. MRI of the abdomen and pelvis is   suggested for further evaluation.    Small left pleural effusion.    Infrarenal abdominal aortic aneurysm measuring 3 cm.    IMPRESSION:       1. Bilateral lower lobe and right middle lobe segmental and subsegmental   branches not evaluated due to extensive motion artifact. No central or   upper lobe pulmonary embolism.     2. Grossly stable appearance of left suprahilar/mediastinal mass and   spiculated left apical mass. Persistent findings of endobronchial spread   of tumor and/or postobstructive pneumonia in the left upper lobe.    3. Mild interval increase in now moderate size left pleural effusion.

## 2019-10-06 NOTE — CONSULT NOTE ADULT - REASON FOR ADMISSION
cancer, nausea, abdominal pain, vommtting
dyspnea and abdominal pain
cancer, nausea, abdominal pain, vomtting

## 2019-10-06 NOTE — PROGRESS NOTE ADULT - SUBJECTIVE AND OBJECTIVE BOX
Subjective:  Patient is a 85y old  Female who presents with a chief complaint of cancer, nausea, abdominal pain, vomiting     HPI:     84 y/o F w/ PMH of dementia, paroxysmal afib, HLD, anxiety, smoking history / COPD on , JOSHUA chest mass/ hilar mass, left supraclavicular node: biopsy small cell cancer, received  a trial of therapy/ can start CARBO ETOPOSIDE x 1 on 10/1 and neurogen, readmitted on 10/3/19 with h abdominal pain, nv. Pain is diffuse and described as "it feels like there's cement blocks in my belly".  Nausea and dry heaves.  no diarrhea.  no fever.  Pt was just discharged from  in the afternoon on 10/2.  States she was well at discharge. Patient on my encounter is somewhat disoriented. She is somewhat sedated at my encounter not able to get very good history.     10/4 - Patient seen and examined at bedside earlier today, feels better, denies pain,  occasional cough, denies abdominal pain, tolerates clear liquid diet  10/5 - pt seen and examined , denies cp, dyspnea, afebrile, + constipation, pain well controlled, tolerates regular food   10/6 - pt seen and examined, more dyspneic, + wheezing, reports epigastric abdominal pain, afebrile, + gen weakness     Review of system- Rest of the review of system are negative except mentioned in HPI    T(C): 36.8 (10-06-19 @ 12:37), Max: 36.9 (10-05-19 @ 21:53)  T(F): 98.2 (10-06-19 @ 12:37), Max: 98.4 (10-05-19 @ 21:53)  HR: 74 (10-06-19 @ 13:15) (74 - 93)  BP: 131/67 (10-06-19 @ 12:37) (109/48 - 131/67)  RR: 16 (10-06-19 @ 12:37) (16 - 18)  SpO2: 98% (10-06-19 @ 13:15) (98% - 100%)  Wt(kg): --    PHYSICAL EXAM:  GENERAL: NAD  NERVOUS SYSTEM:  Alert & Oriented X3, non- focal exam, Motor Strength 5/5 B/L upper and lower extremities; DTRs 2+ intact and symmetric  HEAD:  Atraumatic, Normocephalic  EYES: EOMI, PERRLA, conjunctiva and sclera clear  HEENT: Moist mucous membranes  NECK: Supple, No JVD  CHEST/LUNG: BS decreased at bases, L>R  No rales, +  rhonchi, no wheezing, or rubs  HEART: Regular rate and rhythm; No murmurs, rubs, or gallops  ABDOMEN: Soft, Nontender, Nondistended; Bowel sounds present  GENITOURINARY- Voiding, no suprapubic tenderness  EXTREMITIES:  2+ Peripheral Pulses, No clubbing, cyanosis, or edema  MUSCULOSKELETAL:- No muscle tenderness, Muscle tone normal, No joint tenderness, no Joint swelling, Joint range of motion-normal  SKIN-no rash, no lesion    LABS:  10-06    138  |  103  |  11  ----------------------------<  99  3.5   |  30  |  0.48<L>    Ca    8.0<L>      06 Oct 2019 07:22  Phos  2.9     10-06  Mg     2.2     10-06                              10.5   10.69 )-----------( 116      ( 06 Oct 2019 07:22 )             31.6       CARDIAC MARKERS ( 06 Oct 2019 12:19 )  <0.015 ng/mL / x     / x     / x     / x      CARDIAC MARKERS ( 06 Oct 2019 09:15 )  <0.015 ng/mL / x     / x     / x     / x      CARDIAC MARKERS ( 06 Oct 2019 07:22 )  0.018 ng/mL / x     / 44 U/L / x     / x              10-05    139  |  103  |  12  ----------------------------<  85  3.5   |  32<H>  |  0.49<L>    Ca    7.7<L>      05 Oct 2019 06:22                         11.5   17.35 )-----------( 141      ( 05 Oct 2019 06:22 )             35.3                               13.1   31.40 )-----------( 177      ( 04 Oct 2019 08:15 )             40.7     1004    140  |  104  |  17  ----------------------------<  82  3.6   |  33<H>  |  0.60    Ca    7.9<L>      04 Oct 2019 08:15    TPro  6.0  /  Alb  3.0<L>  /  TBili  0.6  /  DBili  x   /  AST  25  /  ALT  42  /  AlkPhos  79  10-03      CARDIAC MARKERS ( 03 Oct 2019 06:16 )  <0.015 ng/mL / x     / x     / x     / x      CARDIAC MARKERS ( 03 Oct 2019 02:38 )  <0.015 ng/mL / x     / x     / x     / x        EKG - NSR   Urinalysis Basic - ( 03 Oct 2019 03:32 )    Color: Yellow / Appearance: Clear / S.010 / pH: x  Gluc: x / Ketone: Negative  / Bili: Negative / Urobili: Negative mg/dL   Blood: x / Protein: Negative mg/dL / Nitrite: Negative   Leuk Esterase: Negative / RBC: x / WBC x   Sq Epi: x / Non Sq Epi: x / Bacteria: x      CAPILLARY BLOOD GLUCOSE        RECENT CULTURES:    RADIOLOGY & ADDITIONAL TESTS:  < from: CT Angio Chest PE Protocol w/ IV Cont (10.04.19 @ 09:58) >    IMPRESSION:       1. Bilateral lower lobe and right middle lobe segmental and subsegmental   branches not evaluated due to extensive motion artifact. No central or   upper lobe pulmonary embolism.     2. Grossly stable appearance of left suprahilar/mediastinal mass and   spiculated left apical mass. Persistent findings of endobronchial spread   of tumor and/or postobstructive pneumonia in the left upper lobe.    3. Mild interval increase in now moderate size left pleural effusion.      < end of copied text >  < from: Xray Chest 1 View-PORTABLE IMMEDIATE (10.03.19 @ 06:30) >  AP radiograph of the chest demonstrates improved LEFT upper lobe   infiltrate with slight worsening of RIGHT lower lobe infiltrate. Probable   tiny LEFT pleural effusion. The cardiac silhouette is normal in size.   Osseous structures are intact.    Impression:improved LEFT upper lobe infiltrate with slight worsening of   RIGHT lower lobe infiltrate. Probable tiny LEFT pleural effusion.    < end of copied text >    Current medications:  acetaminophen   Tablet .. 650 milliGRAM(s) Oral every 6 hours PRN  ALBUTerol    90 MICROgram(s) HFA Inhaler 2 Puff(s) Inhalation every 6 hours PRN  ALBUTerol/ipratropium for Nebulization 3 milliLiter(s) Nebulizer every 6 hours  ALPRAZolam 0.25 milliGRAM(s) Oral two times a day PRN  aspirin enteric coated 81 milliGRAM(s) Oral daily  atorvastatin 80 milliGRAM(s) Oral at bedtime  buDESOnide 160 MICROgram(s)/formoterol 4.5 MICROgram(s) Inhaler 2 Puff(s) Inhalation two times a day  diltiazem    milliGRAM(s) Oral daily  docusate sodium 100 milliGRAM(s) Oral three times a day  enoxaparin Injectable 60 milliGRAM(s) SubCutaneous two times a day  fentaNYL   Patch  12 MICROgram(s)/Hr 1 Patch Transdermal every 72 hours  fentaNYL   Patch  25 MICROgram(s)/Hr 1 Patch Transdermal every 72 hours  gabapentin 300 milliGRAM(s) Oral three times a day  guaiFENesin  milliGRAM(s) Oral every 12 hours PRN  ketorolac   Injectable 15 milliGRAM(s) IV Push every 6 hours PRN  losartan 50 milliGRAM(s) Oral daily  melatonin 5 milliGRAM(s) Oral at bedtime  morphine  - Injectable 2 milliGRAM(s) IV Push every 4 hours PRN  morphine Concentrate 5 milliGRAM(s) Oral every 4 hours PRN  multivitamin 1 Tablet(s) Oral daily  ondansetron Injectable 4 milliGRAM(s) IV Push every 6 hours PRN  pantoprazole    Tablet 40 milliGRAM(s) Oral before breakfast  senna 2 Tablet(s) Oral at bedtime PRN

## 2019-10-06 NOTE — PROGRESS NOTE ADULT - SUBJECTIVE AND OBJECTIVE BOX
HPI:  84 y/o F COPD with limited small cell cancer : JOSUHA chest mass/ hilar mass, left supraclavicular node; s/p  CARBO ETOPOSIDE x 1 on 10/1 and neurogen now presenting with abdominal pain, nausea, dyspnea / As per patient her O2 was running out therefore she returned to the ER.    patient lives alone at home and has no support    Feeling better this AM   intermittent chest pains  On 02      PAST MEDICAL & SURGICAL HISTORY:  Rheumatoid arthritis  Lung mass  HLD (hyperlipidemia)  HTN (hypertension)  COPD (chronic obstructive pulmonary disease)  Carotid stent occlusion, initial encounter  H/O hernia repair  Ectopic thyroid tissue    MEDICATIONS  (STANDING):  ALBUTerol/ipratropium for Nebulization 3 milliLiter(s) Nebulizer every 6 hours  atorvastatin 80 milliGRAM(s) Oral at bedtime  buDESOnide 160 MICROgram(s)/formoterol 4.5 MICROgram(s) Inhaler 2 Puff(s) Inhalation two times a day  diltiazem    milliGRAM(s) Oral daily  docusate sodium 100 milliGRAM(s) Oral three times a day  enoxaparin Injectable 60 milliGRAM(s) SubCutaneous every 12 hours  fentaNYL   Patch  25 MICROgram(s)/Hr 1 Patch Transdermal every 72 hours  furosemide   Injectable 20 milliGRAM(s) IV Push daily  gabapentin 300 milliGRAM(s) Oral every 12 hours  guaiFENesin  milliGRAM(s) Oral every 12 hours  melatonin 5 milliGRAM(s) Oral at bedtime  methylPREDNISolone sodium succinate Injectable 40 milliGRAM(s) IV Push daily  pantoprazole    Tablet 40 milliGRAM(s) Oral before breakfast  polyethylene glycol 3350 17 Gram(s) Oral daily  senna 2 Tablet(s) Oral at bedtime    MEDICATIONS  (PRN):  acetaminophen   Tablet .. 650 milliGRAM(s) Oral every 6 hours PRN Temp greater or equal to 38C (100.4F), Mild Pain (1 - 3)  ALBUTerol    90 MICROgram(s) HFA Inhaler 2 Puff(s) Inhalation every 6 hours PRN for shortness of breath and/or wheezing  ALPRAZolam 0.25 milliGRAM(s) Oral two times a day PRN anxiety  aluminum hydroxide/magnesium hydroxide/simethicone Suspension 30 milliLiter(s) Oral every 6 hours PRN Dyspepsia  morphine Concentrate 5 milliGRAM(s) Oral every 4 hours PRN pain or dyspnea  ondansetron Injectable 4 milliGRAM(s) IV Push every 6 hours PRN Nausea          SOCIAL HISTORY:  long history of smoking  still smoking intermittently      Vital Signs Last 24 Hrs  T(C): 36.8 (06 Oct 2019 12:37), Max: 36.9 (05 Oct 2019 21:53)  T(F): 98.2 (06 Oct 2019 12:37), Max: 98.4 (05 Oct 2019 21:53)  HR: 87 (06 Oct 2019 12:37) (74 - 100)  BP: 131/67 (06 Oct 2019 12:37) (97/61 - 131/67)  BP(mean): --  RR: 16 (06 Oct 2019 12:37) (16 - 18)  SpO2: 100% (06 Oct 2019 12:37) (98% - 100%)      OE  frail  on O2   chest clear  anxious                            10.5   10.69 )-----------( 116      ( 06 Oct 2019 07:22 )             31.6     10-06    138  |  103  |  11  ----------------------------<  99  3.5   |  30  |  0.48<L>    Ca    8.0<L>      06 Oct 2019 07:22  Phos  2.9     10-06  Mg     2.2     10-06                            RADIOLOGY & ADDITIONAL STUDIES:

## 2019-10-07 ENCOUNTER — RESULT REVIEW (OUTPATIENT)
Age: 84
End: 2019-10-07

## 2019-10-07 LAB
ANION GAP SERPL CALC-SCNC: 5 MMOL/L — SIGNIFICANT CHANGE UP (ref 5–17)
B PERT IGG+IGM PNL SER: ABNORMAL
BUN SERPL-MCNC: 14 MG/DL — SIGNIFICANT CHANGE UP (ref 7–23)
CALCIUM SERPL-MCNC: 8.3 MG/DL — LOW (ref 8.5–10.1)
CHLORIDE SERPL-SCNC: 104 MMOL/L — SIGNIFICANT CHANGE UP (ref 96–108)
CO2 SERPL-SCNC: 31 MMOL/L — SIGNIFICANT CHANGE UP (ref 22–31)
COLOR FLD: YELLOW — SIGNIFICANT CHANGE UP
CREAT SERPL-MCNC: 0.54 MG/DL — SIGNIFICANT CHANGE UP (ref 0.5–1.3)
FLUID INTAKE SUBSTANCE CLASS: SIGNIFICANT CHANGE UP
FLUID SEGMENTED GRANULOCYTES: 18 % — SIGNIFICANT CHANGE UP
GLUCOSE FLD-MCNC: 250 MG/DL — SIGNIFICANT CHANGE UP
GLUCOSE SERPL-MCNC: 124 MG/DL — HIGH (ref 70–99)
HCT VFR BLD CALC: 32.1 % — LOW (ref 34.5–45)
HGB BLD-MCNC: 10.9 G/DL — LOW (ref 11.5–15.5)
LDH SERPL L TO P-CCNC: 203 U/L — SIGNIFICANT CHANGE UP
LYMPHOCYTES # FLD: 42 % — SIGNIFICANT CHANGE UP
MCHC RBC-ENTMCNC: 32.1 PG — SIGNIFICANT CHANGE UP (ref 27–34)
MCHC RBC-ENTMCNC: 34 GM/DL — SIGNIFICANT CHANGE UP (ref 32–36)
MCV RBC AUTO: 94.4 FL — SIGNIFICANT CHANGE UP (ref 80–100)
MONOS+MACROS # FLD: 40 % — SIGNIFICANT CHANGE UP
PH FLD: 7.45 — SIGNIFICANT CHANGE UP
PLATELET # BLD AUTO: 112 K/UL — LOW (ref 150–400)
POTASSIUM SERPL-MCNC: 4 MMOL/L — SIGNIFICANT CHANGE UP (ref 3.5–5.3)
POTASSIUM SERPL-SCNC: 4 MMOL/L — SIGNIFICANT CHANGE UP (ref 3.5–5.3)
PROT FLD-MCNC: 2.3 G/DL — SIGNIFICANT CHANGE UP
RBC # BLD: 3.4 M/UL — LOW (ref 3.8–5.2)
RBC # FLD: 13 % — SIGNIFICANT CHANGE UP (ref 10.3–14.5)
RCV VOL RI: 2000 /UL — HIGH (ref 0–0)
SODIUM SERPL-SCNC: 140 MMOL/L — SIGNIFICANT CHANGE UP (ref 135–145)
TOTAL NUCLEATED CELL COUNT, BODY FLUID: 1532 /UL — SIGNIFICANT CHANGE UP
TUBE TYPE: SIGNIFICANT CHANGE UP
WBC # BLD: 14.77 K/UL — HIGH (ref 3.8–10.5)
WBC # FLD AUTO: 14.77 K/UL — HIGH (ref 3.8–10.5)

## 2019-10-07 PROCEDURE — 88108 CYTOPATH CONCENTRATE TECH: CPT | Mod: 26

## 2019-10-07 PROCEDURE — 32555 ASPIRATE PLEURA W/ IMAGING: CPT | Mod: LT

## 2019-10-07 PROCEDURE — 93306 TTE W/DOPPLER COMPLETE: CPT | Mod: 26

## 2019-10-07 PROCEDURE — 88305 TISSUE EXAM BY PATHOLOGIST: CPT | Mod: 26

## 2019-10-07 PROCEDURE — 71045 X-RAY EXAM CHEST 1 VIEW: CPT | Mod: 26

## 2019-10-07 RX ORDER — CEFEPIME 1 G/1
1000 INJECTION, POWDER, FOR SOLUTION INTRAMUSCULAR; INTRAVENOUS EVERY 12 HOURS
Refills: 0 | Status: DISCONTINUED | OUTPATIENT
Start: 2019-10-07 | End: 2019-10-07

## 2019-10-07 RX ORDER — CEFEPIME 1 G/1
1000 INJECTION, POWDER, FOR SOLUTION INTRAMUSCULAR; INTRAVENOUS EVERY 12 HOURS
Refills: 0 | Status: DISCONTINUED | OUTPATIENT
Start: 2019-10-07 | End: 2019-10-08

## 2019-10-07 RX ADMIN — Medication 100 MILLIGRAM(S): at 22:46

## 2019-10-07 RX ADMIN — GABAPENTIN 300 MILLIGRAM(S): 400 CAPSULE ORAL at 18:20

## 2019-10-07 RX ADMIN — Medication 40 MILLIGRAM(S): at 06:19

## 2019-10-07 RX ADMIN — Medication 5 MILLIGRAM(S): at 22:46

## 2019-10-07 RX ADMIN — Medication 600 MILLIGRAM(S): at 18:20

## 2019-10-07 RX ADMIN — Medication 40 MILLIGRAM(S): at 22:46

## 2019-10-07 RX ADMIN — Medication 40 MILLIGRAM(S): at 14:15

## 2019-10-07 RX ADMIN — Medication 3 MILLILITER(S): at 14:31

## 2019-10-07 RX ADMIN — BUDESONIDE AND FORMOTEROL FUMARATE DIHYDRATE 2 PUFF(S): 160; 4.5 AEROSOL RESPIRATORY (INHALATION) at 08:22

## 2019-10-07 RX ADMIN — Medication 20 MILLIGRAM(S): at 14:14

## 2019-10-07 RX ADMIN — GABAPENTIN 300 MILLIGRAM(S): 400 CAPSULE ORAL at 06:19

## 2019-10-07 RX ADMIN — PANTOPRAZOLE SODIUM 40 MILLIGRAM(S): 20 TABLET, DELAYED RELEASE ORAL at 06:19

## 2019-10-07 RX ADMIN — CEFEPIME 1000 MILLIGRAM(S): 1 INJECTION, POWDER, FOR SOLUTION INTRAMUSCULAR; INTRAVENOUS at 18:20

## 2019-10-07 RX ADMIN — Medication 0.25 MILLIGRAM(S): at 12:34

## 2019-10-07 RX ADMIN — Medication 600 MILLIGRAM(S): at 06:19

## 2019-10-07 RX ADMIN — Medication 100 MILLIGRAM(S): at 06:19

## 2019-10-07 RX ADMIN — ATORVASTATIN CALCIUM 80 MILLIGRAM(S): 80 TABLET, FILM COATED ORAL at 22:46

## 2019-10-07 RX ADMIN — POLYETHYLENE GLYCOL 3350 17 GRAM(S): 17 POWDER, FOR SOLUTION ORAL at 14:16

## 2019-10-07 RX ADMIN — BUDESONIDE AND FORMOTEROL FUMARATE DIHYDRATE 2 PUFF(S): 160; 4.5 AEROSOL RESPIRATORY (INHALATION) at 21:09

## 2019-10-07 RX ADMIN — Medication 3 MILLILITER(S): at 21:08

## 2019-10-07 RX ADMIN — FENTANYL CITRATE 1 PATCH: 50 INJECTION INTRAVENOUS at 14:31

## 2019-10-07 RX ADMIN — Medication 3 MILLILITER(S): at 08:24

## 2019-10-07 RX ADMIN — Medication 120 MILLIGRAM(S): at 06:49

## 2019-10-07 RX ADMIN — FENTANYL CITRATE 1 PATCH: 50 INJECTION INTRAVENOUS at 19:45

## 2019-10-07 RX ADMIN — SENNA PLUS 2 TABLET(S): 8.6 TABLET ORAL at 22:46

## 2019-10-07 RX ADMIN — Medication 3 MILLILITER(S): at 01:38

## 2019-10-07 RX ADMIN — Medication 100 MILLIGRAM(S): at 14:13

## 2019-10-07 NOTE — PROGRESS NOTE ADULT - SUBJECTIVE AND OBJECTIVE BOX
HPI: Pt seen and examined this afternoon in follow up for sx and GOC. Pt notes some improvement in breathing after thoracentesis, which she notes tolerating well. Also notes not as much fluid was drained as she thought. She denies pain, noting her abdominal pain is resolved. She was curious about her sx, wondering if perhaps her abdominal pain had anything to do with her anxiety. Spoke for a while about how this is possible- how anxiety can manifest a number of ways somatically. She explained that her teams were trying to convince her to go to [facility], asking for another opinion. Shared with her that we agreed with this plan, noting the lack of social supports she has at home and how perhaps [facility] would give her a chance to be eased back into her new normal instead of thrown head first into it (which is kind of how she felt last time). Acknowledged her inquiry about her loss of control, reassuring her that most people would feel anxious if they were in her shoes. Provided further perspective by reminding her of all the things she has had to adjust to in a short period- worsening breathing from COPD, then new dx of cancer, then new cancer treatment, now the prospect of enduring whatever may come without persistent support from her sister (who she notes is "done"). When asked why she was hesitant about [facility] she noted not having control there and also being worried about air compressor being on at home. Suggested someone go to her home and turn this off, which she agreed was easy enough and might quell some of her worries, knowing her home was at least in order before another protracted trip away. Overall, she seemed still contemplative about her options, open to opinions, but ultimately clear that her's will rule. Noted respect for this and reminded her that her team has her best interest at heart which, she agreed with.       PAIN: resolved  DYSPNEA: as above      ROS:    Anxiety- as above  All other systems reviewed and negative      PHYSICAL EXAM:    Vital Signs Last 24 Hrs  T(C): 36.8 (07 Oct 2019 13:47), Max: 36.8 (07 Oct 2019 13:47)  T(F): 98.2 (07 Oct 2019 13:47), Max: 98.2 (07 Oct 2019 13:47)  HR: 90 (07 Oct 2019 14:32) (68 - 90)  BP: 119/57 (07 Oct 2019 13:47) (113/44 - 122/52)  RR: 18 (07 Oct 2019 13:47) (16 - 18)  SpO2: 98% (07 Oct 2019 14:32) (93% - 98%)       PPSV2: 30  %    General: Elderly female sitting up in bed, smiling, cheerful, NAD  Mental Status: AOx4  HEENT: mmm, perrl, nc in place, +temporal and clavicular wasting  Lungs: dec at bases bl  Cardiac: +s1 s2 tachy  GI: soft nt nd +bs  : voids  Ext: moves all extremities, muscle and fat wasting throughout  Neuro: no focal deficits    LABS:                        10.9   14.77 )-----------( 112      ( 07 Oct 2019 08:32 )             32.1     10-07    140  |  104  |  14  ----------------------------<  124<H>  4.0   |  31  |  0.54    Ca    8.3<L>      07 Oct 2019 08:32  Phos  2.9     10-06  Mg     2.2     10-06        Albumin: Albumin, Serum: 3.0 g/dL (10-03 @ 02:38)      Allergies    No Known Allergies    Intolerances      MEDICATIONS  (STANDING):  ALBUTerol/ipratropium for Nebulization 3 milliLiter(s) Nebulizer every 6 hours  atorvastatin 80 milliGRAM(s) Oral at bedtime  buDESOnide 160 MICROgram(s)/formoterol 4.5 MICROgram(s) Inhaler 2 Puff(s) Inhalation two times a day  cefepime  Injectable. 1000 milliGRAM(s) IV Push every 12 hours  diltiazem    milliGRAM(s) Oral daily  docusate sodium 100 milliGRAM(s) Oral three times a day  fentaNYL   Patch  25 MICROgram(s)/Hr 1 Patch Transdermal every 72 hours  furosemide   Injectable 20 milliGRAM(s) IV Push daily  gabapentin 300 milliGRAM(s) Oral every 12 hours  guaiFENesin  milliGRAM(s) Oral every 12 hours  melatonin 5 milliGRAM(s) Oral at bedtime  methylPREDNISolone sodium succinate Injectable 40 milliGRAM(s) IV Push every 8 hours  pantoprazole    Tablet 40 milliGRAM(s) Oral before breakfast  polyethylene glycol 3350 17 Gram(s) Oral daily  senna 2 Tablet(s) Oral at bedtime    MEDICATIONS  (PRN):  acetaminophen   Tablet .. 650 milliGRAM(s) Oral every 6 hours PRN Temp greater or equal to 38C (100.4F), Mild Pain (1 - 3)  ALBUTerol    90 MICROgram(s) HFA Inhaler 2 Puff(s) Inhalation every 6 hours PRN for shortness of breath and/or wheezing  ALPRAZolam 0.25 milliGRAM(s) Oral two times a day PRN anxiety  aluminum hydroxide/magnesium hydroxide/simethicone Suspension 30 milliLiter(s) Oral every 6 hours PRN Dyspepsia  morphine Concentrate 5 milliGRAM(s) Oral every 4 hours PRN pain or dyspnea  ondansetron Injectable 4 milliGRAM(s) IV Push every 6 hours PRN Nausea      RADIOLOGY:     EXAM:  ECHO TTE WO CON COMP W DOP    PROCEDURE DATE:  10/07/2019       Summary     Mild (1+) mitral regurgitation is present.   Mild mitral annular calcification is present.   Normal aortic valve structure and function.   Mild (1+) tricuspid valve regurgitation is present.   Normal appearing pulmonic valve structure and function.   Normal appearing left atrium.   The left ventricle is normal in size, wall thickness, wall motion and   contractility.   Estimated left ventricular ejection fraction is 60-65 %.   Normal appearing right atrium.   Normal appearing right ventricle structure and function.   No evidence of pericardial effusion.     Signature     ----------------------------------------------------------------   Electronically signed by Orin Thomas MD(Interpreting   physician) on 10/07/2019 10:01 AM   ----------------------------------------------------------------    EXAM:  XR CHEST PORTABLE IMMED 1V                        PROCEDURE DATE:  10/06/2019      IMPRESSION: Improving left lung airspace disease and worsening right   basilar airspace disease.. Worsening moderate left pleural effusion.      MARGOT HERRING   This document has been electronically signed. Oct  7 2019  8:56AM

## 2019-10-07 NOTE — PROGRESS NOTE ADULT - ASSESSMENT
84 y/o F w/ PMH of dementia, paroxysmal afib, HLD, anxiety, smoking history / COPD on 02, JOSHUA chest mass/ hilar mass, left supraclavicular node: biopsy small cell cancer, received  a trial of therapy/ can start CARBO ETOPOSIDE x 1 on 10/1 and neuopogen, now presenting with abdominal pain, nv.    * Abdominal pain, and nausea likely steroids induced gastritis   resolved     * Small cell lung CA JOSHUA with left supraclavicular node   - s/p chemo x 1     * Chronic hypoxic respiratory failure, COPD   postobstr. PNA Cefepime  -s/p thora 160ml drained    * afib w/ rvr  - controlled on po cardizem 120   - switch lovenox to eliquis after thoracocentesis          Code status: DNR/DNI- MOLST

## 2019-10-07 NOTE — PROGRESS NOTE ADULT - SUBJECTIVE AND OBJECTIVE BOX
Patient is a 85y old  Female who presents with a chief complaint of cancer, nausea, abdominal pain, vommtting (04 Oct 2019 07:57)      HPI:  84 y/o F w/ PMH of dementia, paroxysmal afib, HLD, anxiety, smoking history / COPD on , JOSHUA chest mass/ hilar mass, left supraclavicular node: biopsy small cell cancer,received  a trial of therapy/ can start CARBO ETOPOSIDE x 1 on 10/1 and neuopogen, now presenting with abdominal pain, nv. Pain is diffuse and described as "it feels like there's cement blocks in my belly".  Nausea and dry heaves.  no diarrhea.  no fever.  Pt was just discharged from  in the afternoon on 10/2.  States she was well at discharge. Patient on my encounter is somewhat disoriented. She is somehwat sedated at my encounter not able to get very good history.   Currently denies any chest pain, sob, fevers, chills. (03 Oct 2019 08:17)  seen and eval today  data discussed with Dr Powers and Dr Giles  no hemoptysis  no pleuritic pain  some cough    10/6  No acute pulmonary events occurred overnight     10/7  coughing up thick / dark sputum  still with congestion  difficulty with breathing and wheezing at times in am hrs specially  PAST MEDICAL & SURGICAL HISTORY:  Rheumatoid arthritis  Lung mass  HLD (hyperlipidemia)  HTN (hypertension)  COPD (chronic obstructive pulmonary disease)  Carotid stent occlusion, initial encounter  H/O hernia repair  Ectopic thyroid tissue      PREVIOUS DIAGNOSTIC TESTING:      MEDICATIONS  (STANDING):  ALBUTerol/ipratropium for Nebulization 3 milliLiter(s) Nebulizer every 6 hours  atorvastatin 80 milliGRAM(s) Oral at bedtime  buDESOnide 160 MICROgram(s)/formoterol 4.5 MICROgram(s) Inhaler 2 Puff(s) Inhalation two times a day  diltiazem    milliGRAM(s) Oral daily  docusate sodium 100 milliGRAM(s) Oral three times a day  fentaNYL   Patch  25 MICROgram(s)/Hr 1 Patch Transdermal every 72 hours  furosemide   Injectable 20 milliGRAM(s) IV Push daily  gabapentin 300 milliGRAM(s) Oral every 12 hours  guaiFENesin  milliGRAM(s) Oral every 12 hours  melatonin 5 milliGRAM(s) Oral at bedtime  methylPREDNISolone sodium succinate Injectable 40 milliGRAM(s) IV Push daily  pantoprazole    Tablet 40 milliGRAM(s) Oral before breakfast  polyethylene glycol 3350 17 Gram(s) Oral daily  senna 2 Tablet(s) Oral at bedtime      MEDICATIONS  (PRN):  acetaminophen   Tablet .. 650 milliGRAM(s) Oral every 6 hours PRN Temp greater or equal to 38C (100.4F), Mild Pain (1 - 3)  ALBUTerol    90 MICROgram(s) HFA Inhaler 2 Puff(s) Inhalation every 6 hours PRN for shortness of breath and/or wheezing  ALPRAZolam 0.25 milliGRAM(s) Oral two times a day PRN anxiety  guaiFENesin  milliGRAM(s) Oral every 12 hours PRN congestion  morphine  - Injectable 2 milliGRAM(s) IV Push every 4 hours PRN Severe Pain (7 - 10)  morphine Concentrate 5 milliGRAM(s) Oral every 4 hours PRN pain or dyspnea  ondansetron Injectable 4 milliGRAM(s) IV Push every 6 hours PRN Nausea        Vital Signs Last 24 Hrs  T(C): 36.3 (07 Oct 2019 05:11), Max: 36.8 (06 Oct 2019 12:37)  T(F): 97.4 (07 Oct 2019 05:11), Max: 98.2 (06 Oct 2019 12:37)  HR: 90 (07 Oct 2019 08:27) (68 - 90)  BP: 122/52 (07 Oct 2019 05:11) (113/44 - 131/67)  BP(mean): --  RR: 16 (06 Oct 2019 20:37) (16 - 16)  SpO2: 98% (07 Oct 2019 08:27) (93% - 100%)  PHYSICAL EXAM  General Appearance: cooperative, no acute distress,   HEENT: PERRL, conjunctiva clear, EOM's intact, non injected pharynx, no exudate, TM   normal  Neck: Supple, , no adenopathy, thyroid: not enlarged, no carotid bruit or JVD  Back: Symmetric, no  tenderness,no soft tissue tenderness  Lungs: hyperresonnat to percussion, mild end exp wheeze, scattered lower lobe rhonchi  Heart: Regular rate and rhythm, S1, S2 normal, no murmur, rub or gallop  Abdomen: Soft, non-tender, bowel sounds active , no hepatosplenomegaly  Extremities: no cyanosis or edema, no joint swelling  Skin: Skin color, texture normal, no rashes   Neurologic: Alert and oriented X3 , cranial nerves intact, sensory and motor normal,    ECG:    LABS:                        10.5   10.69 )-----------( 116      ( 06 Oct 2019 07:22 )             31.6   10    138  |  103  |  11  ----------------------------<  99  3.5   |  30  |  0.48<L>    Ca    8.0<L>      06 Oct 2019 07:22  Phos  2.9     10-06  Mg     2.2     10-06                            14.4   60.90 )-----------( 248      ( 03 Oct 2019 02:38 )             43.1     10    139  |  104  |  24<H>  ----------------------------<  131<H>  4.3   |  28  |  0.61    Ca    8.4<L>      03 Oct 2019 02:38    TPro  6.0  /  Alb  3.0<L>  /  TBili  0.6  /  DBili  x   /  AST  25  /  ALT  42  /  AlkPhos  79  10-03    CARDIAC MARKERS ( 03 Oct 2019 06:16 )  <0.015 ng/mL / x     / x     / x     / x      CARDIAC MARKERS ( 03 Oct 2019 02:38 )  <0.015 ng/mL / x     / x     / x     / x                Urinalysis Basic - ( 03 Oct 2019 03:32 )    Color: Yellow / Appearance: Clear / S.010 / pH: x  Gluc: x / Ketone: Negative  / Bili: Negative / Urobili: Negative mg/dL   Blood: x / Protein: Negative mg/dL / Nitrite: Negative   Leuk Esterase: Negative / RBC: x / WBC x   Sq Epi: x / Non Sq Epi: x / Bacteria: x    < from: Xray Chest 1 View-PORTABLE IMMEDIATE (10.03.19 @ 06:30) >  PROCEDURE DATE:  10/03/2019          INTERPRETATION:  History: Chest and abdominal pain    Chest:  one view.      Comparison: 2019    AP radiograph of the chest demonstrates improved LEFT upper lobe   infiltrate with slight worsening of RIGHT lower lobe infiltrate. Probable   tiny LEFT pleural effusion. The cardiac silhouette is normal in size.   Osseous structures are intact.    Impression:improved LEFT upper lobe infiltrate with slight worsening of   RIGHT lower lobe infiltrate. Probable tiny LEFT pleural effusion.    < end of copied text >          RADIOLOGY & ADDITIONAL STUDIES:    < from: CT Abdomen and Pelvis w/ IV Cont (10.03.19 @ 04:57) >  IMPRESSION:    No evidence of small bowel obstruction or active bowel inflammation.   Indeterminate 0.7 cm high density focus inthe gastric lumen. It is   unclear if this is ingested material or an enhancing mass. Correlation   with endoscopy suggested.    1.8 cm indeterminate hypodense lesion in the left hepatic lobe. 1.6 cm   exophytic enhancing lesion arising from the posterior lower pole of the   right kidney suspicious for neoplasm. MRI of the abdomen and pelvis is   suggested for further evaluation.    Small left pleural effusion.    Infrarenal abdominal aortic aneurysm measuring 3 cm.    IMPRESSION:       1. Bilateral lower lobe and right middle lobe segmental and subsegmental   branches not evaluated due to extensive motion artifact. No central or   upper lobe pulmonary embolism.     2. Grossly stable appearance of left suprahilar/mediastinal mass and   spiculated left apical mass. Persistent findings of endobronchial spread   of tumor and/or postobstructive pneumonia in the left upper lobe.    3. Mild interval increase in now moderate size left pleural effusion.

## 2019-10-07 NOTE — PROGRESS NOTE ADULT - ASSESSMENT
85y old Female coming from home with hx of COPD, Left Lung mass (biopsied on 9/16 found to be metastatic small cell carcinoma), Afib, RA previously on immunosuppressant tx, HLD, HTN, recently admitted 9/ for above dx and again 9/25 until yesterday for worsening of shortness of breath w/ associated with palpitation. Found here to have COPD exacerbation c/b rapid afib (the latter resolved). No readmitted 10/3 for c/o abd pain and n/v, with concern for side effect from recent initiation of chemo. Found to have leukocytosis, likely due to neupogen, and CT ruling out obstruction but showing high density focus in lumen. CXR also showing infiltrate bl. Palliative Care consulted to assist with establishing GOC, known to our service from last admission (yesterday).     1) Pain  - likely neuropathic pain from underlying metastatic disease with ayse involvement c/b possible reflux/chemo side effect, the latter is resolved today  - c/w gabapentin (already at a reasonable dose)  - c/w Roxanol 5mg q4h prn for longer-lasting relief sinc n/v resolved and pt able to tolerate po  - c/w fentanyl 37mcg   - if n/v returns and pt unable to leeanna po, IV morphine 2mg was well tolerated previously    2) Nausea and vomiting  - now resolved  - imaging ruling out obstruction but seeing ?mass  - n/v may also be side effect of newly initiated chemo  - c/w zofran  - c/w PPI given description of sx suggesting GERD, GI notes appreciated    3) Anxiety  - since n/v has resolved c/w xanax po 0.25mg bid prn  - anxiety likely also exacerbating perception of sx  - will discuss making these meds standing    4) chronic resp failure  - multifactorial- likely due to COPD exacerbation atop new lung cancer and now CXR showing infiltrates/effusions  - c/w nebs and bronchodilators  - c/w supplemental O2  - discussed the usefulness of opioids for air hunger with pt as well, may use morphine for this as well  - pulm consult appreciated  - pt s/p thoracentesis with IR today 160cc out  - also now on IV abx for possible infection    5) Metastatic small cell ca  - reviewed onc impression from last admission  - was considering palliative chemo (Dr. Campbell) and RT (Dr. Raya) 2 admissions ago and agreed to initiating chemo with Dr. Tay  - seemed to have tolerated this well  - though last dose 10/1 and sx may be delayed side effect  - onc consult appreciated, plan for follow up outpt    6) Debility  - quite independent before this, but admits to walking with walker and her family being concerned about fall  - PPS 40-50% now due to sx  - evidence of malnutrition  - PT eval appreciated- now considering [facility]    7) GOC/Advanced Directives  - pt has capacity for decision making, is clear about her feelings, and reasonable enough to consider other opinions  - HCP on file naming her sister Lety Cardenas   - MOLST revised 9/30: DNR, limited medical interventions, DNI, send to hospital, no feeding tube, trial of IVF, determine use of abx  - GOC meeting held with pt and sister 9/30- plan to c/w cancer treatment and return home when deemed medically stable with home palliative care services ([facility]). Pt now with sx resolved and clear that she is going home today with intent to follow up with onc cc: further tx, with information shared on how to arrange hospice services if this meeting leads to a decision to forgo further tx. Pt clear that she does not want her sister called, as she is respecting her boundaries (sister has expressed to pt that she is overwhelmed)    Thank you for including us in Ms. Juarez's care. Will continue to follow with you.    Douglas Mcclure MD  Palliative Care Attending

## 2019-10-07 NOTE — PROGRESS NOTE ADULT - SUBJECTIVE AND OBJECTIVE BOX
Interventional Radiology Brief Post Procedure Note    Procedure: Left thoracentesis    Operators: Michael Guardado MD    Anesthesia (type): Local lidocaine.    Contrast: None.     EBL: Minimal.     Findings/Follow up Plan of Care: Successful left thoracentesis yielding 160 mL of serous fluid.     Specimens Removed: 160 mL of serous fluid.     Implants: None.     Complications: No immediate complications.     Condition/Disposition: Chest radiograph now, bedrest for 1 hour.     Please call Interventional Radiology x [phone] with any questions, concerns, or issues.

## 2019-10-07 NOTE — PHYSICAL THERAPY INITIAL EVALUATION ADULT - PERTINENT HX OF CURRENT PROBLEM, REHAB EVAL
86 y/o F w/ PMH of dementia, paroxysmal afib, HLD, anxiety, smoking history / COPD on 02, JOSHUA chest mass/ hilar mass, left supraclavicular node: biopsy small cell cancer, received  a trial of therapy/ can start CARBO ETOPOSIDE x 1 on 10/1 and neurogen, readmitted on 10/3/19 with h abdominal pain, nv. Pain is diffuse and described as "it feels like there's cement blocks in my belly".  Nausea and dry heaves.  no diarrhea.  no fever.  Pt was just discharged from  in the afternoon on 10/2.

## 2019-10-07 NOTE — PROGRESS NOTE ADULT - ASSESSMENT
84 y/o F w/ PMH of dementia, paroxysmal afib, HLD, anxiety, smoking history / COPD on 02, JOSUHA chest mass/ hilar mass, left supraclavicular node: biopsy small cell cancer,received  a trial of therapy/ can start CARBO ETOPOSIDE x 1 on 10/1 and neuopogen, now presenting with abdominal pain, nv. Pain is diffuse and described as "it feels like there's cement blocks in my belly".  Nausea and dry heaves.  no diarrhea.  no fever.  Pt was just discharged from  in the afternoon on 10/2.  States she was well at discharge. Patient on my encounter is somewhat disoriented. She is somehwat sedated at my encounter not able to get very good history.   Currently denies any chest pain, sob, fevers, chills.       abd pain in upper area has resolved  cont PPI  PPI and hot packs to abd wall for possible gastritis and component of musculoskeletal pain      lung ca with mets    gastric lesion on CT and will check out pt PET scan      A fib     [facility] oncology

## 2019-10-07 NOTE — PROGRESS NOTE ADULT - SUBJECTIVE AND OBJECTIVE BOX
Vascular & Interventional Radiology Pre-Procedure Note    Procedure Name: Left thoracentesis.    HPI: 85y Female with moderate left pleural effusion. Diagnostic and therapeutic thoracentesis is requested.     Allergies:   Medications (Abx/Cardiac/Anticoagulation/Blood Products)    aspirin  chewable: 81 milliGRAM(s) Oral (10-06 @ 07:41)  diltiazem   CD: 120 milliGRAM(s) Oral (10-07 @ 06:49)  enoxaparin Injectable: 60 milliGRAM(s) SubCutaneous (10-06 @ 06:11)  furosemide   Injectable: 20 milliGRAM(s) IV Push (10-06 @ 13:02)    Data:    T(C): 36.3  HR: 90  BP: 122/52  RR: 16  SpO2: 98%    -WBC 14.77 / HgB 10.9 / Hct 32.1 / Plt 112  -Na 140 / Cl 104 / BUN 14 / Glucose 124  -K 4.0 / CO2 31 / Cr 0.54  -ALT -- / Alk Phos -- / T.Bili --    Plan:   -85y Female presents for left thoracentesis. Discussed with patient the risks of the procedure and she is agreeable to proceed.   -Risks/Benefits/alternatives explained with the patient and/or healthcare proxy and witnessed informed consent obtained.

## 2019-10-07 NOTE — PROGRESS NOTE ADULT - SUBJECTIVE AND OBJECTIVE BOX
Patient is a 85y old  Female who presents with a chief complaint of cancer, nausea, abdominal pain, vommtting (07 Oct 2019 09:08)      HPI:  84 y/o F w/ PMH of dementia, paroxysmal afib, HLD, anxiety, smoking history / COPD on 02, JOSHUA chest mass/ hilar mass, left supraclavicular node: biopsy small cell cancer,received  a trial of therapy/ can start CARBO ETOPOSIDE x 1 on 10/1 and neuopogen, now presenting with abdominal pain, nv. Pain is diffuse and described as "it feels like there's cement blocks in my belly".  Nausea and dry heaves.  no diarrhea.  no fever.  Pt was just discharged from  in the afternoon on 10/2.  States she was well at discharge. Patient on my encounter is somewhat disoriented. She is somehwat sedated at my encounter not able to get very good history.   Currently denies any chest pain, sob, fevers, chills. (03 Oct 2019 08:17)          patient comfortable. Denies any abdominal pain. Tolerating diet well. Positive fatigue      PAST MEDICAL & SURGICAL HISTORY:  Rheumatoid arthritis  Lung mass  HLD (hyperlipidemia)  HTN (hypertension)  COPD (chronic obstructive pulmonary disease)  Carotid stent occlusion, initial encounter  H/O hernia repair  Ectopic thyroid tissue      MEDICATIONS  (STANDING):  ALBUTerol/ipratropium for Nebulization 3 milliLiter(s) Nebulizer every 6 hours  atorvastatin 80 milliGRAM(s) Oral at bedtime  buDESOnide 160 MICROgram(s)/formoterol 4.5 MICROgram(s) Inhaler 2 Puff(s) Inhalation two times a day  cefepime   IVPB 1000 milliGRAM(s) IV Intermittent every 12 hours  diltiazem    milliGRAM(s) Oral daily  docusate sodium 100 milliGRAM(s) Oral three times a day  fentaNYL   Patch  25 MICROgram(s)/Hr 1 Patch Transdermal every 72 hours  furosemide   Injectable 20 milliGRAM(s) IV Push daily  gabapentin 300 milliGRAM(s) Oral every 12 hours  guaiFENesin  milliGRAM(s) Oral every 12 hours  melatonin 5 milliGRAM(s) Oral at bedtime  methylPREDNISolone sodium succinate Injectable 40 milliGRAM(s) IV Push every 8 hours  pantoprazole    Tablet 40 milliGRAM(s) Oral before breakfast  polyethylene glycol 3350 17 Gram(s) Oral daily  senna 2 Tablet(s) Oral at bedtime    MEDICATIONS  (PRN):  acetaminophen   Tablet .. 650 milliGRAM(s) Oral every 6 hours PRN Temp greater or equal to 38C (100.4F), Mild Pain (1 - 3)  ALBUTerol    90 MICROgram(s) HFA Inhaler 2 Puff(s) Inhalation every 6 hours PRN for shortness of breath and/or wheezing  ALPRAZolam 0.25 milliGRAM(s) Oral two times a day PRN anxiety  aluminum hydroxide/magnesium hydroxide/simethicone Suspension 30 milliLiter(s) Oral every 6 hours PRN Dyspepsia  morphine Concentrate 5 milliGRAM(s) Oral every 4 hours PRN pain or dyspnea  ondansetron Injectable 4 milliGRAM(s) IV Push every 6 hours PRN Nausea      Allergies    No Known Allergies    Intolerances        SOCIAL HISTORY:  NC  FAMILY HISTORY:  No pertinent family history in first degree relatives      REVIEW OF SYSTEMS:    CONSTITUTIONAL: No weakness, fevers or chills  EYES/ENT: No visual changes;  No vertigo or throat pain   NECK: No pain or stiffness  RESPIRATORY: No cough, wheezing, hemoptysis; No shortness of breath  CARDIOVASCULAR: No chest pain or palpitations  GENITOURINARY: No dysuria, frequency or hematuria  NEUROLOGICAL: No numbness or weakness  SKIN: No itching, burning, rashes, or lesions   All other review of systems is negative unless indicated above.    Vital Signs Last 24 Hrs  T(C): 36.3 (07 Oct 2019 05:11), Max: 36.8 (06 Oct 2019 12:37)  T(F): 97.4 (07 Oct 2019 05:11), Max: 98.2 (06 Oct 2019 12:37)  HR: 90 (07 Oct 2019 08:27) (68 - 90)  BP: 122/52 (07 Oct 2019 05:11) (113/44 - 131/67)  BP(mean): --  RR: 16 (06 Oct 2019 20:37) (16 - 16)  SpO2: 98% (07 Oct 2019 08:27) (93% - 100%)    PHYSICAL EXAM:    Constitutional: NAD, well-developed  HEENT: EOMI, throat clear  Neck: No LAD, supple  Respiratory: CTA and P  Cardiovascular: S1 and S2, RRR, no M  Gastrointestinal: BS+, soft, NT/ND, neg HSM,  Extremities: No peripheral edema, neg clubing, cyanosis  Vascular: 2+ peripheral pulses  Neurological: A/O x 2 no focal deficits  Psychiatric: Normal mood, normal affect  Skin: No rashes    LABS:  CBC Full  -  ( 07 Oct 2019 08:32 )  WBC Count : 14.77 K/uL  RBC Count : 3.40 M/uL  Hemoglobin : 10.9 g/dL  Hematocrit : 32.1 %  Platelet Count - Automated : 112 K/uL  Mean Cell Volume : 94.4 fl  Mean Cell Hemoglobin : 32.1 pg  Mean Cell Hemoglobin Concentration : 34.0 gm/dL  Auto Neutrophil # : x  Auto Lymphocyte # : x  Auto Monocyte # : x  Auto Eosinophil # : x  Auto Basophil # : x  Auto Neutrophil % : x  Auto Lymphocyte % : x  Auto Monocyte % : x  Auto Eosinophil % : x  Auto Basophil % : x    10-06    138  |  103  |  11  ----------------------------<  99  3.5   |  30  |  0.48<L>    Ca    8.0<L>      06 Oct 2019 07:22  Phos  2.9     10-06  Mg     2.2     10-06              RADIOLOGY & ADDITIONAL OSMAN< from: Xray Chest 1 View-PORTABLE IMMEDIATE (10.06.19 @ 16:41) >  EXAM:  XR CHEST PORTABLE IMMED 1V                            PROCEDURE DATE:  10/06/2019          INTERPRETATION:  XR CHEST IMMEDIATE    Single AP view    HISTORY:  chest pain    Comparison: Chest x-ray 10/3/2019      The cardiac silhouette is within normal limits. Improving left lung   airspace disease and worsening right basilar airspace disease.. Worsening   moderate left pleural effusion.    IMPRESSION: Improving left lung airspace disease and worsening right   basilar airspace disease.. Worsening moderate left pleural effusion.                MARGOT HERRING   This document has been electronically signed. Oct  7 2019  8:56AM              < end of copied text >  DIES:

## 2019-10-07 NOTE — PROGRESS NOTE ADULT - ASSESSMENT
1) Small Cell Lung Cancer  2) COPD (Severe, non-compliant with outpatient visits)  3) Pleural Effusion  4) Abnormal CT Chest  5)post obstructive pneumonia  6)No PE on CTA / on AC for Afib        84 y/o F w/ PMH of dementia, paroxysmal afib, HLD, anxiety, smoking history / COPD on 02, JOSHUA chest mass/ hilar mass, left supraclavicular node: biopsy small cell cancer,received  a trial of therapy/ can start CARBO ETOPOSIDE x 1 on 10/1 and neuopogen, now presenting with abdominal pain, nv. Pain is diffuse and described as "it feels like there's cement blocks in my belly".  Nausea and dry heaves.  no diarrhea.  no fever.  Pt was just discharged from  in the afternoon on 10/2.    Currently denies any chest pain, sob, fevers, chills.   Abd pain without acute pathology on ct imaging  COPD / Emphysema with mild bronchospasm  hypoxemia with Broad DDX, need to eval fro PE  Afib on AC makes thromboembolic dz less likely  (pt was not sure if she was taking any "blood thinners" as outpt)  Small left sided pleural effusion  leukocytosis due to neupogen response  non toxic / no evidence of sepsis  CT PE reviewed that showed Bilateral lower lobe and right middle lobe segmental and subsegmental   branches not evaluated due to extensive motion artifact. No central or   upper lobe pulmonary embolism. Grossly stable appearance of left suprahilar/mediastinal mass and   spiculated left apical mass. Persistent findings of endobronchial spread of tumor and/or postobstructive pneumonia in the left upper lobe.  Mild interval increase in now moderate size left pleural effusion.  Recommend IR Guided thoracentesis if diuresis not improving effusion; follow up CXR  Will add course of antibiotics  send PF for analysis / ordered  increase Iv solumedrol  check o2 sat after ambulation in am

## 2019-10-07 NOTE — PROGRESS NOTE ADULT - SUBJECTIVE AND OBJECTIVE BOX
84 y/o F w/ PMH of dementia, paroxysmal afib, HLD, anxiety, smoking history / COPD on 02, JOSHUA chest mass/ hilar mass, left supraclavicular node: biopsy small cell cancer, received  a trial of therapy/ can start CARBO ETOPOSIDE x 1 on 10/1 and neurogen, readmitted on 10/3/19 with h abdominal pain, nv. Pain is diffuse and described as "it feels like there's cement blocks in my belly".  Nausea and dry heaves.  no diarrhea.  no fever.  Pt was just discharged from  in the afternoon on 10/2.  States she was well at discharge. Patient on my encounter is somewhat disoriented. She is somewhat sedated at my encounter not able to get very good history.     s/p thora 160ml drained        PHYSICAL EXAM:  GENERAL: NAD  NERVOUS SYSTEM:  Alert & Oriented X3, non- focal exam, Motor Strength 5/5 B/L upper and lower extremities; DTRs 2+ intact and symmetric  HEAD:  Atraumatic, Normocephalic  EYES: EOMI, PERRLA, conjunctiva and sclera clear  HEENT: Moist mucous membranes  NECK: Supple, No JVD  CHEST/LUNG: BS decreased at bases, L>R  No rales, +  rhonchi, no wheezing, or rubs  HEART: Regular rate and rhythm; No murmurs, rubs, or gallops  ABDOMEN: Soft, Nontender, Nondistended; Bowel sounds present  GENITOURINARY- Voiding, no suprapubic tenderness  EXTREMITIES:  2+ Peripheral Pulses, No clubbing, cyanosis, or edema  MUSCULOSKELETAL:- No muscle tenderness, Muscle tone normal, No joint tenderness, no Joint swelling, Joint range of motion-normal  SKIN-no rash, no lesion    LABS:                        10.9   14.77 )-----------( 112      ( 07 Oct 2019 08:32 )             32.1   10-07    140  |  104  |  14  ----------------------------<  124<H>  4.0   |  31  |  0.54    Ca    8.3<L>      07 Oct 2019 08:32  Phos  2.9     10-06  Mg     2.2     10-06

## 2019-10-08 ENCOUNTER — TRANSCRIPTION ENCOUNTER (OUTPATIENT)
Age: 84
End: 2019-10-08

## 2019-10-08 VITALS — OXYGEN SATURATION: 88 %

## 2019-10-08 DIAGNOSIS — C34.90 MALIGNANT NEOPLASM OF UNSPECIFIED PART OF UNSPECIFIED BRONCHUS OR LUNG: ICD-10-CM

## 2019-10-08 DIAGNOSIS — Z66 DO NOT RESUSCITATE: ICD-10-CM

## 2019-10-08 DIAGNOSIS — C79.9 SECONDARY MALIGNANT NEOPLASM OF UNSPECIFIED SITE: ICD-10-CM

## 2019-10-08 DIAGNOSIS — Z51.5 ENCOUNTER FOR PALLIATIVE CARE: ICD-10-CM

## 2019-10-08 DIAGNOSIS — R53.81 OTHER MALAISE: ICD-10-CM

## 2019-10-08 DIAGNOSIS — M06.9 RHEUMATOID ARTHRITIS, UNSPECIFIED: ICD-10-CM

## 2019-10-08 DIAGNOSIS — G89.3 NEOPLASM RELATED PAIN (ACUTE) (CHRONIC): ICD-10-CM

## 2019-10-08 DIAGNOSIS — J43.9 EMPHYSEMA, UNSPECIFIED: ICD-10-CM

## 2019-10-08 DIAGNOSIS — G62.9 POLYNEUROPATHY, UNSPECIFIED: ICD-10-CM

## 2019-10-08 DIAGNOSIS — I10 ESSENTIAL (PRIMARY) HYPERTENSION: ICD-10-CM

## 2019-10-08 DIAGNOSIS — F17.200 NICOTINE DEPENDENCE, UNSPECIFIED, UNCOMPLICATED: ICD-10-CM

## 2019-10-08 DIAGNOSIS — F41.9 ANXIETY DISORDER, UNSPECIFIED: ICD-10-CM

## 2019-10-08 DIAGNOSIS — E78.5 HYPERLIPIDEMIA, UNSPECIFIED: ICD-10-CM

## 2019-10-08 DIAGNOSIS — J96.21 ACUTE AND CHRONIC RESPIRATORY FAILURE WITH HYPOXIA: ICD-10-CM

## 2019-10-08 DIAGNOSIS — I48.91 UNSPECIFIED ATRIAL FIBRILLATION: ICD-10-CM

## 2019-10-08 LAB
NIGHT BLUE STAIN TISS: SIGNIFICANT CHANGE UP
SPECIMEN SOURCE: SIGNIFICANT CHANGE UP

## 2019-10-08 RX ORDER — ALPRAZOLAM 0.25 MG
1 TABLET ORAL
Qty: 0 | Refills: 0 | DISCHARGE

## 2019-10-08 RX ORDER — ALPRAZOLAM 0.25 MG
1 TABLET ORAL
Qty: 20 | Refills: 0
Start: 2019-10-08 | End: 2019-10-17

## 2019-10-08 RX ADMIN — Medication 600 MILLIGRAM(S): at 06:28

## 2019-10-08 RX ADMIN — BUDESONIDE AND FORMOTEROL FUMARATE DIHYDRATE 2 PUFF(S): 160; 4.5 AEROSOL RESPIRATORY (INHALATION) at 09:50

## 2019-10-08 RX ADMIN — GABAPENTIN 300 MILLIGRAM(S): 400 CAPSULE ORAL at 06:27

## 2019-10-08 RX ADMIN — Medication 3 MILLILITER(S): at 09:48

## 2019-10-08 RX ADMIN — Medication 0.25 MILLIGRAM(S): at 15:00

## 2019-10-08 RX ADMIN — Medication 120 MILLIGRAM(S): at 06:28

## 2019-10-08 RX ADMIN — Medication 40 MILLIGRAM(S): at 06:28

## 2019-10-08 RX ADMIN — Medication 100 MILLIGRAM(S): at 06:27

## 2019-10-08 RX ADMIN — PANTOPRAZOLE SODIUM 40 MILLIGRAM(S): 20 TABLET, DELAYED RELEASE ORAL at 06:27

## 2019-10-08 RX ADMIN — CEFEPIME 1000 MILLIGRAM(S): 1 INJECTION, POWDER, FOR SOLUTION INTRAMUSCULAR; INTRAVENOUS at 06:28

## 2019-10-08 RX ADMIN — Medication 3 MILLILITER(S): at 14:00

## 2019-10-08 NOTE — PROGRESS NOTE ADULT - ASSESSMENT
1) Small Cell Lung Cancer  2) COPD (Severe, non-compliant with outpatient visits)  3) Pleural Effusion  4) Abnormal CT Chest  5)post obstructive pneumonia  6)No PE on CTA / on AC for Afib  7)s/p left sided IR thoracentesis, 160 cc removed, PF analysis noted        84 y/o F w/ PMH of dementia, paroxysmal afib, HLD, anxiety, smoking history / COPD on 02, JOSHUA chest mass/ hilar mass, left supraclavicular node: biopsy small cell cancer,received  a trial of therapy/ can start CARBO ETOPOSIDE x 1 on 10/1 and neuopogen, now presenting with abdominal pain, nv. Pain is diffuse and described as "it feels like there's cement blocks in my belly".  Nausea and dry heaves.  no diarrhea.  no fever.  Pt was just discharged from  in the afternoon on 10/2.    Currently denies any chest pain, sob, fevers, chills.   Abd pain without acute pathology on ct imaging  COPD / Emphysema with mild bronchospasm  hypoxemia with Broad DDX, need to eval fro PE  Afib on AC makes thromboembolic dz less likely  (pt was not sure if she was taking any "blood thinners" as outpt)  Small left sided pleural effusion  leukocytosis due to neupogen response  non toxic / no evidence of sepsis  CT PE reviewed that showed Bilateral lower lobe and right middle lobe segmental and subsegmental   branches not evaluated due to extensive motion artifact. No central or   upper lobe pulmonary embolism. Grossly stable appearance of left suprahilar/mediastinal mass and   spiculated left apical mass. Persistent findings of endobronchial spread of tumor and/or postobstructive pneumonia in the left upper lobe.  Mild interval increase in now moderate size left pleural effusion.  tolerated IR Guided thoracentesis, 160 cc removed, cytology pending, not infected  Will add course of antibiotics  send PF for analysis / ordered  increase Iv solumedrol  check o2 sat after ambulation in am  she will require po prednisone 40 mg x 3 days , 30 mg x 3 days and taper off  also need to complete course of antibiotics started  Fu with Dr GOODMAN in 1-2 weeks  Dr Blunt is covering 10/9 if needed

## 2019-10-08 NOTE — PROGRESS NOTE ADULT - SUBJECTIVE AND OBJECTIVE BOX
Patient is a 85y old  Female who presents with a chief complaint of cancer, nausea, abdominal pain, vommtting (08 Oct 2019 12:32)      HPI:  84 y/o F w/ PMH of dementia, paroxysmal afib, HLD, anxiety, smoking history / COPD on 02, JOSHUA chest mass/ hilar mass, left supraclavicular node: biopsy small cell cancer,received  a trial of therapy/ can start CARBO ETOPOSIDE x 1 on 10/1 and neuopogen, now presenting with abdominal pain, nv. Pain is diffuse and described as "it feels like there's cement blocks in my belly".  Nausea and dry heaves.  no diarrhea.  no fever.  Pt was just discharged from  in the afternoon on 10/2.  States she was well at discharge. Patient on my encounter is somewhat disoriented. She is somehwat sedated at my encounter not able to get very good history.   Currently denies any chest pain, sob, fevers, chills. (03 Oct 2019 08:17)    abdominal pain improved. Denies any nausea or vomiting. Looking forward to continue treatment      PAST MEDICAL & SURGICAL HISTORY:  Rheumatoid arthritis  Lung mass  HLD (hyperlipidemia)  HTN (hypertension)  COPD (chronic obstructive pulmonary disease)  Carotid stent occlusion, initial encounter  H/O hernia repair  Ectopic thyroid tissue      MEDICATIONS  (STANDING):  ALBUTerol/ipratropium for Nebulization 3 milliLiter(s) Nebulizer every 6 hours  amoxicillin  875 milliGRAM(s)/clavulanate 1 Tablet(s) Oral two times a day  atorvastatin 80 milliGRAM(s) Oral at bedtime  buDESOnide 160 MICROgram(s)/formoterol 4.5 MICROgram(s) Inhaler 2 Puff(s) Inhalation two times a day  diltiazem    milliGRAM(s) Oral daily  docusate sodium 100 milliGRAM(s) Oral three times a day  fentaNYL   Patch  25 MICROgram(s)/Hr 1 Patch Transdermal every 72 hours  gabapentin 300 milliGRAM(s) Oral every 12 hours  guaiFENesin  milliGRAM(s) Oral every 12 hours  melatonin 5 milliGRAM(s) Oral at bedtime  pantoprazole    Tablet 40 milliGRAM(s) Oral before breakfast  polyethylene glycol 3350 17 Gram(s) Oral daily  senna 2 Tablet(s) Oral at bedtime    MEDICATIONS  (PRN):  acetaminophen   Tablet .. 650 milliGRAM(s) Oral every 6 hours PRN Temp greater or equal to 38C (100.4F), Mild Pain (1 - 3)  ALBUTerol    90 MICROgram(s) HFA Inhaler 2 Puff(s) Inhalation every 6 hours PRN for shortness of breath and/or wheezing  ALPRAZolam 0.25 milliGRAM(s) Oral two times a day PRN anxiety  aluminum hydroxide/magnesium hydroxide/simethicone Suspension 30 milliLiter(s) Oral every 6 hours PRN Dyspepsia  morphine Concentrate 5 milliGRAM(s) Oral every 4 hours PRN pain or dyspnea  ondansetron Injectable 4 milliGRAM(s) IV Push every 6 hours PRN Nausea      Allergies    No Known Allergies    Intolerances        SOCIAL HISTORY:NC    FAMILY HISTORY:  No pertinent family history in first degree relatives      REVIEW OF SYSTEMS:    CONSTITUTIONAL: No weakness, fevers or chills  EYES/ENT: No visual changes;  No vertigo or throat pain   NECK: No pain or stiffness  RESPIRATORY: No cough, wheezing, hemoptysis; No shortness of breath  CARDIOVASCULAR: No chest pain or palpitations  GENITOURINARY: No dysuria, frequency or hematuria  NEUROLOGICAL: No numbness or weakness  SKIN: No itching, burning, rashes, or lesions   All other review of systems is negative unless indicated above.    Vital Signs Last 24 Hrs  T(C): 36.4 (08 Oct 2019 11:15), Max: 36.6 (07 Oct 2019 20:13)  T(F): 97.6 (08 Oct 2019 11:15), Max: 97.9 (07 Oct 2019 20:13)  HR: 74 (08 Oct 2019 14:00) (32 - 86)  BP: 117/43 (08 Oct 2019 11:15) (110/57 - 126/50)  BP(mean): 60 (08 Oct 2019 11:15) (60 - 60)  RR: 19 (08 Oct 2019 11:15) (18 - 19)  SpO2: 88% (08 Oct 2019 14:00) (88% - 99%)    PHYSICAL EXAM:    Constitutional: NAD, well-developed  HEENT: EOMI, throat clear  Neck: No LAD, supple  Respiratory: CTA and P  Cardiovascular: S1 and S2, RRR, no M  Gastrointestinal: BS+, soft, NT/ND, neg HSM,  Extremities: No peripheral edema, neg clubing, cyanosis  Vascular: 2+ peripheral pulses  Neurological: A/O x 3, no focal deficits  Psychiatric: Normal mood, normal affect  Skin: No rashes    LABS:  CBC Full  -  ( 07 Oct 2019 08:32 )  WBC Count : 14.77 K/uL  RBC Count : 3.40 M/uL  Hemoglobin : 10.9 g/dL  Hematocrit : 32.1 %  Platelet Count - Automated : 112 K/uL  Mean Cell Volume : 94.4 fl  Mean Cell Hemoglobin : 32.1 pg  Mean Cell Hemoglobin Concentration : 34.0 gm/dL  Auto Neutrophil # : x  Auto Lymphocyte # : x  Auto Monocyte # : x  Auto Eosinophil # : x  Auto Basophil # : x  Auto Neutrophil % : x  Auto Lymphocyte % : x  Auto Monocyte % : x  Auto Eosinophil % : x  Auto Basophil % : x    10-07    140  |  104  |  14  ----------------------------<  124<H>  4.0   |  31  |  0.54    Ca    8.3<L>      07 Oct 2019 08:32              RADIOLOGY & ADDITIONAL STUDIES:

## 2019-10-08 NOTE — PROGRESS NOTE ADULT - SUBJECTIVE AND OBJECTIVE BOX
Patient is a 85y old  Female who presents with a chief complaint of cancer, nausea, abdominal pain, vommtting (04 Oct 2019 07:57)      HPI:  84 y/o F w/ PMH of dementia, paroxysmal afib, HLD, anxiety, smoking history / COPD on , JOSHUA chest mass/ hilar mass, left supraclavicular node: biopsy small cell cancer,received  a trial of therapy/ can start CARBO ETOPOSIDE x 1 on 10/1 and neuopogen, now presenting with abdominal pain, nv. Pain is diffuse and described as "it feels like there's cement blocks in my belly".  Nausea and dry heaves.  no diarrhea.  no fever.  Pt was just discharged from  in the afternoon on 10/2.  States she was well at discharge. Patient on my encounter is somewhat disoriented. She is somehwat sedated at my encounter not able to get very good history.   Currently denies any chest pain, sob, fevers, chills. (03 Oct 2019 08:17)  seen and eval today  data discussed with Dr Powers and Dr Giles  no hemoptysis  no pleuritic pain  some cough    10/6  No acute pulmonary events occurred overnight     10/7  coughing up thick / dark sputum  still with congestion  difficulty with breathing and wheezing at times in am hrs specially    10/8  she feels much better with steroid and antibiotic therapy  breathing has improved as well  no distress  PAST MEDICAL & SURGICAL HISTORY:  Rheumatoid arthritis  Lung mass  HLD (hyperlipidemia)  HTN (hypertension)  COPD (chronic obstructive pulmonary disease)  Carotid stent occlusion, initial encounter  H/O hernia repair  Ectopic thyroid tissue      PREVIOUS DIAGNOSTIC TESTING:      MEDICATIONS  (STANDING):  ALBUTerol/ipratropium for Nebulization 3 milliLiter(s) Nebulizer every 6 hours  atorvastatin 80 milliGRAM(s) Oral at bedtime  buDESOnide 160 MICROgram(s)/formoterol 4.5 MICROgram(s) Inhaler 2 Puff(s) Inhalation two times a day  cefepime  Injectable. 1000 milliGRAM(s) IV Push every 12 hours  diltiazem    milliGRAM(s) Oral daily  docusate sodium 100 milliGRAM(s) Oral three times a day  fentaNYL   Patch  25 MICROgram(s)/Hr 1 Patch Transdermal every 72 hours  furosemide   Injectable 20 milliGRAM(s) IV Push daily  gabapentin 300 milliGRAM(s) Oral every 12 hours  guaiFENesin  milliGRAM(s) Oral every 12 hours  melatonin 5 milliGRAM(s) Oral at bedtime  methylPREDNISolone sodium succinate Injectable 40 milliGRAM(s) IV Push every 8 hours  pantoprazole    Tablet 40 milliGRAM(s) Oral before breakfast  polyethylene glycol 3350 17 Gram(s) Oral daily  senna 2 Tablet(s) Oral at bedtime      MEDICATIONS  (PRN):  acetaminophen   Tablet .. 650 milliGRAM(s) Oral every 6 hours PRN Temp greater or equal to 38C (100.4F), Mild Pain (1 - 3)  ALBUTerol    90 MICROgram(s) HFA Inhaler 2 Puff(s) Inhalation every 6 hours PRN for shortness of breath and/or wheezing  ALPRAZolam 0.25 milliGRAM(s) Oral two times a day PRN anxiety  guaiFENesin  milliGRAM(s) Oral every 12 hours PRN congestion  morphine  - Injectable 2 milliGRAM(s) IV Push every 4 hours PRN Severe Pain (7 - 10)  morphine Concentrate 5 milliGRAM(s) Oral every 4 hours PRN pain or dyspnea  ondansetron Injectable 4 milliGRAM(s) IV Push every 6 hours PRN Nausea        Vital Signs Last 24 Hrs  T(C): 36.6 (08 Oct 2019 04:54), Max: 36.8 (07 Oct 2019 13:47)  T(F): 97.8 (08 Oct 2019 04:54), Max: 98.2 (07 Oct 2019 13:47)  HR: 86 (08 Oct 2019 04:54) (76 - 90)  BP: 126/50 (08 Oct 2019 04:54) (110/57 - 126/50)  BP(mean): --  RR: 18 (08 Oct 2019 04:54) (18 - 18)  SpO2: 98% (08 Oct 2019 04:54) (97% - 99%)  PHYSICAL EXAM  General Appearance: cooperative, no acute distress,   HEENT: PERRL, conjunctiva clear, EOM's intact, non injected pharynx, no exudate, TM   normal  Neck: Supple, , no adenopathy, thyroid: not enlarged, no carotid bruit or JVD  Back: Symmetric, no  tenderness,no soft tissue tenderness  Lungs: hyperresonnat to percussion, mild end exp wheeze, scattered lower lobe rhonchi  Heart: Regular rate and rhythm, S1, S2 normal, no murmur, rub or gallop  Abdomen: Soft, non-tender, bowel sounds active , no hepatosplenomegaly  Extremities: no cyanosis or edema, no joint swelling  Skin: Skin color, texture normal, no rashes   Neurologic: Alert and oriented X3 , cranial nerves intact, sensory and motor normal,    ECG:    LABS:                        10.5   10.69 )-----------( 116      ( 06 Oct 2019 07:22 )             31.6   10    138  |  103  |  11  ----------------------------<  99  3.5   |  30  |  0.48<L>    Ca    8.0<L>      06 Oct 2019 07:22  Phos  2.9     10  Mg     2.2     10-06                            14.4   60.90 )-----------( 248      ( 03 Oct 2019 02:38 )             43.1     10-    139  |  104  |  24<H>  ----------------------------<  131<H>  4.3   |  28  |  0.61    Ca    8.4<L>      03 Oct 2019 02:38    TPro  6.0  /  Alb  3.0<L>  /  TBili  0.6  /  DBili  x   /  AST  25  /  ALT  42  /  AlkPhos  79  10-03    CARDIAC MARKERS ( 03 Oct 2019 06:16 )  <0.015 ng/mL / x     / x     / x     / x      CARDIAC MARKERS ( 03 Oct 2019 02:38 )  <0.015 ng/mL / x     / x     / x     / x                Urinalysis Basic - ( 03 Oct 2019 03:32 )    Color: Yellow / Appearance: Clear / S.010 / pH: x  Gluc: x / Ketone: Negative  / Bili: Negative / Urobili: Negative mg/dL   Blood: x / Protein: Negative mg/dL / Nitrite: Negative   Leuk Esterase: Negative / RBC: x / WBC x   Sq Epi: x / Non Sq Epi: x / Bacteria: x    < from: Xray Chest 1 View-PORTABLE IMMEDIATE (10.03.19 @ 06:30) >  PROCEDURE DATE:  10/03/2019          INTERPRETATION:  History: Chest and abdominal pain    Chest:  one view.      Comparison: 2019    AP radiograph of the chest demonstrates improved LEFT upper lobe   infiltrate with slight worsening of RIGHT lower lobe infiltrate. Probable   tiny LEFT pleural effusion. The cardiac silhouette is normal in size.   Osseous structures are intact.    Impression:improved LEFT upper lobe infiltrate with slight worsening of   RIGHT lower lobe infiltrate. Probable tiny LEFT pleural effusion.    < end of copied text >          RADIOLOGY & ADDITIONAL STUDIES:    < from: CT Abdomen and Pelvis w/ IV Cont (10.03.19 @ 04:57) >  IMPRESSION:    No evidence of small bowel obstruction or active bowel inflammation.   Indeterminate 0.7 cm high density focus inthe gastric lumen. It is   unclear if this is ingested material or an enhancing mass. Correlation   with endoscopy suggested.    1.8 cm indeterminate hypodense lesion in the left hepatic lobe. 1.6 cm   exophytic enhancing lesion arising from the posterior lower pole of the   right kidney suspicious for neoplasm. MRI of the abdomen and pelvis is   suggested for further evaluation.    Small left pleural effusion.    Infrarenal abdominal aortic aneurysm measuring 3 cm.    IMPRESSION:       1. Bilateral lower lobe and right middle lobe segmental and subsegmental   branches not evaluated due to extensive motion artifact. No central or   upper lobe pulmonary embolism.     2. Grossly stable appearance of left suprahilar/mediastinal mass and   spiculated left apical mass. Persistent findings of endobronchial spread   of tumor and/or postobstructive pneumonia in the left upper lobe.    3. Mild interval increase in now moderate size left pleural effusion.    < from: Xray Chest 1 View- PORTABLE-Routine (10.07.19 @ 13:28) >  PROCEDURE DATE:  10/07/2019          INTERPRETATION:  History: Status post LEFT thoracentesis    Chest:  one view.      Comparison: 10/06/2019    AP radiograph of the chest demonstratesslight decrease in small LEFT   pleural effusion with minimal residual. The cardiac silhouette is normal   in size. Osseous structures are intact.    Impression:slight decrease in small LEFT pleural effusion with minimal   residual.    < end of copied text >

## 2019-10-08 NOTE — PROGRESS NOTE ADULT - REASON FOR ADMISSION
cancer, nausea, abdominal pain, vommtting
cancer, nausea, abdominal pain, vommtting
cancer, nausea, abdominal pain,
cancer, nausea, abdominal pain,
cancer, nausea, abdominal pain, vomiting
cancer, nausea, abdominal pain, vommtting
cancer, nausea, abdominal pain, vomtting
nausea, abdominal pain, vomiting

## 2019-10-08 NOTE — DISCHARGE NOTE PROVIDER - CARE PROVIDER_API CALL
Maximiliano Tay)  Hematology; Internal Medicine; Medical Oncology  [address] [place] [address], [address] [place] [address]  Phone: [phone]  Fax: [phone]  Follow Up Time:

## 2019-10-08 NOTE — DISCHARGE NOTE NURSING/CASE MANAGEMENT/SOCIAL WORK - PATIENT PORTAL LINK FT
You can access the FollowMyHealth Patient Portal offered by [facility] by registering at the following website: [email or web address]. By joining [facility]’s FollowMyHealth portal, you will also be able to view your health information using other applications (apps) compatible with our system.

## 2019-10-08 NOTE — PROGRESS NOTE ADULT - ASSESSMENT
84 y/o F w/ PMH of dementia, paroxysmal afib, HLD, anxiety, smoking history / COPD on 02, JOSHUA chest mass/ hilar mass, left supraclavicular node: biopsy small cell cancer,received  a trial of therapy/ can start CARBO ETOPOSIDE x 1 on 10/1 and neuopogen, now presenting with abdominal pain, nv. Pain is diffuse and described as "it feels like there's cement blocks in my belly".  Nausea and dry heaves.  no diarrhea.  no fever.  Pt was just discharged from  in the afternoon on 10/2.  States she was well at discharge. Patient on my encounter is somewhat disoriented. She is somehwat sedated at my encounter not able to get very good history.   Currently denies any chest pain, sob, fevers, chills.       abd pain in upper area has resolved  cont PPI  PPI and hot packs to abd wall for possible gastritis and component of musculoskeletal pain      lung ca with mets    gastric lesion on CT and does not want eval of it as she has lung ca  DW pt      A fib

## 2019-10-11 ENCOUNTER — APPOINTMENT (OUTPATIENT)
Dept: CARE COORDINATION | Facility: HOME HEALTH | Age: 84
End: 2019-10-11

## 2019-10-14 DIAGNOSIS — Z79.01 LONG TERM (CURRENT) USE OF ANTICOAGULANTS: ICD-10-CM

## 2019-10-14 DIAGNOSIS — J90 PLEURAL EFFUSION, NOT ELSEWHERE CLASSIFIED: ICD-10-CM

## 2019-10-14 DIAGNOSIS — F03.90 UNSPECIFIED DEMENTIA WITHOUT BEHAVIORAL DISTURBANCE: ICD-10-CM

## 2019-10-14 DIAGNOSIS — Z74.01 BED CONFINEMENT STATUS: ICD-10-CM

## 2019-10-14 DIAGNOSIS — F41.9 ANXIETY DISORDER, UNSPECIFIED: ICD-10-CM

## 2019-10-14 DIAGNOSIS — G89.3 NEOPLASM RELATED PAIN (ACUTE) (CHRONIC): ICD-10-CM

## 2019-10-14 DIAGNOSIS — Z91.14 PATIENT'S OTHER NONCOMPLIANCE WITH MEDICATION REGIMEN: ICD-10-CM

## 2019-10-14 DIAGNOSIS — Z66 DO NOT RESUSCITATE: ICD-10-CM

## 2019-10-14 DIAGNOSIS — Z99.81 DEPENDENCE ON SUPPLEMENTAL OXYGEN: ICD-10-CM

## 2019-10-14 DIAGNOSIS — Z51.5 ENCOUNTER FOR PALLIATIVE CARE: ICD-10-CM

## 2019-10-14 DIAGNOSIS — Z87.891 PERSONAL HISTORY OF NICOTINE DEPENDENCE: ICD-10-CM

## 2019-10-14 DIAGNOSIS — J96.11 CHRONIC RESPIRATORY FAILURE WITH HYPOXIA: ICD-10-CM

## 2019-10-14 DIAGNOSIS — J18.8 OTHER PNEUMONIA, UNSPECIFIED ORGANISM: ICD-10-CM

## 2019-10-14 DIAGNOSIS — Z79.82 LONG TERM (CURRENT) USE OF ASPIRIN: ICD-10-CM

## 2019-10-14 DIAGNOSIS — C34.12 MALIGNANT NEOPLASM OF UPPER LOBE, LEFT BRONCHUS OR LUNG: ICD-10-CM

## 2019-10-14 DIAGNOSIS — E78.5 HYPERLIPIDEMIA, UNSPECIFIED: ICD-10-CM

## 2019-10-14 DIAGNOSIS — J44.9 CHRONIC OBSTRUCTIVE PULMONARY DISEASE, UNSPECIFIED: ICD-10-CM

## 2019-10-14 DIAGNOSIS — M06.9 RHEUMATOID ARTHRITIS, UNSPECIFIED: ICD-10-CM

## 2019-10-23 ENCOUNTER — APPOINTMENT (OUTPATIENT)
Dept: CARE COORDINATION | Facility: HOME HEALTH | Age: 84
End: 2019-10-23
Payer: MEDICARE

## 2019-10-23 VITALS
HEART RATE: 62 BPM | TEMPERATURE: 96.6 F | DIASTOLIC BLOOD PRESSURE: 70 MMHG | OXYGEN SATURATION: 96 % | SYSTOLIC BLOOD PRESSURE: 130 MMHG | RESPIRATION RATE: 22 BRPM

## 2019-10-23 DIAGNOSIS — R60.0 LOCALIZED EDEMA: ICD-10-CM

## 2019-10-23 PROCEDURE — 99349 HOME/RES VST EST MOD MDM 40: CPT

## 2019-10-23 RX ORDER — ROSUVASTATIN CALCIUM 40 MG/1
40 TABLET, FILM COATED ORAL DAILY
Refills: 0 | Status: ACTIVE | COMMUNITY

## 2019-10-23 RX ORDER — FLUTICASONE FUROATE, UMECLIDINIUM BROMIDE AND VILANTEROL TRIFENATATE 100; 62.5; 25 UG/1; UG/1; UG/1
100-62.5-25 POWDER RESPIRATORY (INHALATION) DAILY
Refills: 0 | Status: ACTIVE | COMMUNITY
Start: 2019-10-23

## 2019-10-23 RX ORDER — DICYCLOMINE HYDROCHLORIDE 10 MG/1
10 CAPSULE ORAL 3 TIMES DAILY
Refills: 0 | Status: ACTIVE | COMMUNITY

## 2019-10-23 RX ORDER — OXYCODONE AND ACETAMINOPHEN 5; 325 MG/1; MG/1
5-325 TABLET ORAL
Qty: 14 | Refills: 0 | Status: COMPLETED | COMMUNITY
Start: 2019-08-27

## 2019-10-23 RX ORDER — BUDESONIDE AND FORMOTEROL FUMARATE DIHYDRATE 160; 4.5 UG/1; UG/1
160-4.5 AEROSOL RESPIRATORY (INHALATION)
Qty: 10 | Refills: 0 | Status: DISCONTINUED | COMMUNITY
Start: 2019-07-06

## 2019-10-23 RX ORDER — PANTOPRAZOLE 40 MG/1
40 TABLET, DELAYED RELEASE ORAL
Qty: 30 | Refills: 0 | Status: ACTIVE | COMMUNITY
Start: 2019-10-02

## 2019-10-23 RX ORDER — FENTANYL 25 UG/H
25 PATCH, EXTENDED RELEASE TRANSDERMAL
Qty: 10 | Refills: 0 | Status: ACTIVE | COMMUNITY
Start: 2019-09-23

## 2019-10-23 RX ORDER — HYDROMORPHONE HYDROCHLORIDE 4 MG/1
4 TABLET ORAL
Qty: 40 | Refills: 0 | Status: DISCONTINUED | COMMUNITY
Start: 2019-09-16

## 2019-10-23 RX ORDER — PREDNISONE 10 MG/1
10 TABLET ORAL
Qty: 30 | Refills: 0 | Status: COMPLETED | COMMUNITY
Start: 2019-10-02

## 2019-10-23 RX ORDER — ALPRAZOLAM 0.25 MG/1
0.25 TABLET ORAL
Qty: 20 | Refills: 0 | Status: ACTIVE | COMMUNITY
Start: 2019-10-08

## 2019-10-23 RX ORDER — ONDANSETRON 8 MG/1
8 TABLET ORAL
Qty: 20 | Refills: 0 | Status: ACTIVE | COMMUNITY
Start: 2019-10-21

## 2019-10-23 RX ORDER — DOXYCYCLINE HYCLATE 100 MG/1
100 CAPSULE ORAL
Qty: 20 | Refills: 0 | Status: DISCONTINUED | COMMUNITY
Start: 2019-08-09

## 2019-10-23 RX ORDER — APIXABAN 5 MG/1
5 TABLET, FILM COATED ORAL
Qty: 60 | Refills: 0 | Status: ACTIVE | COMMUNITY
Start: 2019-08-12

## 2019-10-23 RX ORDER — IRBESARTAN 150 MG/1
150 TABLET ORAL
Refills: 0 | Status: ACTIVE | COMMUNITY

## 2019-10-23 RX ORDER — ALBUTEROL SULFATE 90 UG/1
108 (90 BASE) AEROSOL, METERED RESPIRATORY (INHALATION)
Qty: 18 | Refills: 0 | Status: ACTIVE | COMMUNITY
Start: 2019-09-21

## 2019-10-23 RX ORDER — METHYLPREDNISOLONE 4 MG/1
4 TABLET ORAL
Qty: 21 | Refills: 0 | Status: DISCONTINUED | COMMUNITY
Start: 2019-08-09

## 2019-10-23 RX ORDER — AMOXICILLIN AND CLAVULANATE POTASSIUM 875; 125 MG/1; MG/1
875-125 TABLET, COATED ORAL
Qty: 10 | Refills: 0 | Status: COMPLETED | COMMUNITY
Start: 2019-10-08

## 2019-10-23 RX ORDER — DILTIAZEM HYDROCHLORIDE 120 MG/1
120 CAPSULE, EXTENDED RELEASE ORAL
Qty: 30 | Refills: 0 | Status: ACTIVE | COMMUNITY
Start: 2019-08-08

## 2019-10-23 RX ORDER — GABAPENTIN 300 MG/1
300 CAPSULE ORAL 3 TIMES DAILY
Refills: 0 | Status: ACTIVE | COMMUNITY
Start: 2019-10-15

## 2019-10-23 RX ORDER — PROCHLORPERAZINE MALEATE 10 MG/1
10 TABLET ORAL
Qty: 30 | Refills: 0 | Status: ACTIVE | COMMUNITY
Start: 2019-10-21

## 2019-10-23 RX ORDER — FENTANYL 12 UG/H
12 PATCH, EXTENDED RELEASE TRANSDERMAL
Qty: 10 | Refills: 0 | Status: ACTIVE | COMMUNITY
Start: 2019-10-03

## 2019-10-24 PROBLEM — R60.0 LEG EDEMA: Status: ACTIVE | Noted: 2019-10-24

## 2019-10-24 RX ORDER — CYCLOBENZAPRINE HYDROCHLORIDE 5 MG/1
5 TABLET, FILM COATED ORAL
Qty: 30 | Refills: 0 | Status: DISCONTINUED | COMMUNITY
Start: 2019-09-16

## 2019-10-24 RX ORDER — ASPIRIN ENTERIC COATED TABLETS 81 MG 81 MG/1
81 TABLET, DELAYED RELEASE ORAL DAILY
Qty: 30 | Refills: 5 | Status: ACTIVE | COMMUNITY
Start: 2019-10-24

## 2019-10-24 NOTE — PLAN
[FreeTextEntry1] : Lung Cancer\par Long discussion with patient regarding options of chemo vs palliative or hospice services.\par Pt would like to have treatments and called her sister Lety to confirm she will drive her to and from treatments next week.\par Called Dr Carrillo's office and S/W CATRINA Bazzi to let them know patient has limited support at home. She is anxious about coming home and having N/V/D or feeling too weak to care for herself. PA advised she will discuss with Dr Connelly and pt's treatments will be tailored to what she can tolerate.\par C/W oxygen 2 liters NC for sleep and prn\par Discussed case with [facility] RN, will follow pt and sending a SW for a visit.\par \par COPD\par c/w inhalers\par proper use and technique demonstrated\par c/w O2 2liters prn and at sleep\par \par Edema\par Aquaphor placed to pink dry lower extremites\par Ace wraps applied and advised pt keep elevated\par Call CN if edema/redness  does not improve or gets worse.\par \par Enforced  TCM program and to call first with any problems, issues or concerns. Enforced contact number is on yellow card and provided another card. Pt very thankful for the visit.\par

## 2019-10-24 NOTE — REVIEW OF SYSTEMS
[Fatigue] : fatigue [Lower Ext Edema] : lower extremity edema [Shortness Of Breath] : shortness of breath [Dyspnea on Exertion] : dyspnea on exertion [Nausea] : nausea [Anxiety] : anxiety [Confusion] : confusion [Negative] : Musculoskeletal [FreeTextEntry6] : lung cancer, COPD [FreeTextEntry5] : Afib [FreeTextEntry7] : occasional nausea [de-identified] : at times

## 2019-10-24 NOTE — ASSESSMENT
[FreeTextEntry1] : 84 y/o female with lung cancer and COPD with anxiety with upcoming chemo and new lower ext edema and itchy redness.

## 2019-10-24 NOTE — HISTORY OF PRESENT ILLNESS
[de-identified] : As per SCM, 86 y/o F w/ PMH of dementia, paroxysmal afib, HLD, anxiety, smoking history / COPD on 02, JOSHUA chest mass/ hilar mass, left supraclavicular node: biopsy small cell cancer. She has had multiple admissions this year secondary to SOB, 9/25/19-10/2/19 she was admitted and treated for COPD and received  received a trial of therapy/ can start CARBO ETOPOSIDE x 1 on 10/1 and neuopogen. She as discharged to home and returned the following day with c/o abd pain N/V. She was discharged to home on 10/8/19 with  HC services. She was recommended for rehab and refused. In addition, had a palliative consult with hospice discussion which patient refused. She is followed her Oncologist as outpatient, Dr Connelly and Dr Juarez is PCP and Dr Amador is Pulmonologist. She has followed up with all three since her discharge. She is scheduled for Chemo next week and reports she is very anxious because she lives alone and has no one to help her if she experiences difficult side effects of chemo. As per patient's request called Dr Connelly's office to discuss her concerns. she tells me she has some difficulty swallowing because the tumor is "pressing on  esophagus". She does manage to take her medications without difficulty. Medication reconciliation done. Teach back completed. She has O2 at 2 liters vis NC she uses to sleep and prn. SHe understands how to use and denies any questions. SHe has chronic dyspnea on exertion but denies any new or worsening sx. In addition she denies CP, palpitations, dizziness, N/V/C/D. SHe uses a RW to ambulate. She reports swelling in her legs and redness. She was advised to use vaseline for dryness and ace wrap to reduce swelling when seen by her MD. She did not have either in the house and has not been able to do.  [FreeTextEntry1] : Follow up hospitalization for COPD 9/25/19 -10/2/19 hen readmitted 10/3/19-10/8/19 with abdominal pain. now with c/o anxiety and lower extremity swelling.  Pt is temporarily unable to leave home as it requires a considerable and taxing effort, currently exhibits an unsteady gait and requires assistive device and Oxygen to leave home.  \par \par

## 2019-10-24 NOTE — PHYSICAL EXAM
[No Acute Distress] : no acute distress [Normal Voice/Communication] : normal voice/communication [Ill-Appearing] : ill-appearing [Supple] : supple [No Accessory Muscle Use] : no accessory muscle use [Decreased Breath Sounds] : breath sounds were decreased diffusely [Irregularly Irregular] : irregularly irregular [Normal S1] : normal S1 [Pedal Pulses Present] : the pedal pulses are present [Normal S2] : normal S2 [Soft] : abdomen soft [Non-distended] : non-distended [Non Tender] : non-tender [Normal Bowel Sounds] : normal bowel sounds [No Spinal Tenderness] : no spinal tenderness [Grossly Normal Strength/Tone] : grossly normal strength/tone [No Focal Deficits] : no focal deficits [Alert and Oriented x3] : oriented to person, place, and time [Normal Insight/Judgement] : insight and judgment were intact [de-identified] : frail, disheveled [de-identified] : Left leg +1 LILY dry pink excoriated skin trace edema in right scant pinkness [de-identified] : left LILY dry pink excoriated skin [de-identified] : Anxious over upcoming chemo treatment

## 2019-11-02 ENCOUNTER — EMERGENCY (EMERGENCY)
Facility: HOSPITAL | Age: 84
LOS: 1 days | Discharge: HOSPICE | End: 2019-11-04
Attending: EMERGENCY MEDICINE
Payer: MEDICARE

## 2019-11-02 VITALS
SYSTOLIC BLOOD PRESSURE: 109 MMHG | DIASTOLIC BLOOD PRESSURE: 80 MMHG | RESPIRATION RATE: 16 BRPM | HEART RATE: 91 BPM | OXYGEN SATURATION: 100 % | WEIGHT: 138.01 LBS | TEMPERATURE: 98 F

## 2019-11-02 DIAGNOSIS — Z87.891 PERSONAL HISTORY OF NICOTINE DEPENDENCE: ICD-10-CM

## 2019-11-02 DIAGNOSIS — T82.898A OTHER SPECIFIED COMPLICATION OF VASCULAR PROSTHETIC DEVICES, IMPLANTS AND GRAFTS, INITIAL ENCOUNTER: Chronic | ICD-10-CM

## 2019-11-02 DIAGNOSIS — Z79.51 LONG TERM (CURRENT) USE OF INHALED STEROIDS: ICD-10-CM

## 2019-11-02 DIAGNOSIS — Z99.81 DEPENDENCE ON SUPPLEMENTAL OXYGEN: ICD-10-CM

## 2019-11-02 DIAGNOSIS — Z66 DO NOT RESUSCITATE: ICD-10-CM

## 2019-11-02 DIAGNOSIS — F41.8 OTHER SPECIFIED ANXIETY DISORDERS: ICD-10-CM

## 2019-11-02 DIAGNOSIS — M13.80 OTHER SPECIFIED ARTHRITIS, UNSPECIFIED SITE: ICD-10-CM

## 2019-11-02 DIAGNOSIS — Z98.890 OTHER SPECIFIED POSTPROCEDURAL STATES: Chronic | ICD-10-CM

## 2019-11-02 DIAGNOSIS — C34.12 MALIGNANT NEOPLASM OF UPPER LOBE, LEFT BRONCHUS OR LUNG: ICD-10-CM

## 2019-11-02 DIAGNOSIS — Q89.2 CONGENITAL MALFORMATIONS OF OTHER ENDOCRINE GLANDS: Chronic | ICD-10-CM

## 2019-11-02 DIAGNOSIS — E78.5 HYPERLIPIDEMIA, UNSPECIFIED: ICD-10-CM

## 2019-11-02 DIAGNOSIS — Z79.899 OTHER LONG TERM (CURRENT) DRUG THERAPY: ICD-10-CM

## 2019-11-02 DIAGNOSIS — J44.9 CHRONIC OBSTRUCTIVE PULMONARY DISEASE, UNSPECIFIED: ICD-10-CM

## 2019-11-02 DIAGNOSIS — R29.6 REPEATED FALLS: ICD-10-CM

## 2019-11-02 DIAGNOSIS — Z79.82 LONG TERM (CURRENT) USE OF ASPIRIN: ICD-10-CM

## 2019-11-02 DIAGNOSIS — I48.91 UNSPECIFIED ATRIAL FIBRILLATION: ICD-10-CM

## 2019-11-02 LAB
ANION GAP SERPL CALC-SCNC: 7 MMOL/L — SIGNIFICANT CHANGE UP (ref 5–17)
BASOPHILS # BLD AUTO: 0 K/UL — SIGNIFICANT CHANGE UP (ref 0–0.2)
BASOPHILS NFR BLD AUTO: 0 % — SIGNIFICANT CHANGE UP (ref 0–2)
BUN SERPL-MCNC: 19 MG/DL — SIGNIFICANT CHANGE UP (ref 7–23)
CALCIUM SERPL-MCNC: 9 MG/DL — SIGNIFICANT CHANGE UP (ref 8.5–10.1)
CHLORIDE SERPL-SCNC: 105 MMOL/L — SIGNIFICANT CHANGE UP (ref 96–108)
CO2 SERPL-SCNC: 26 MMOL/L — SIGNIFICANT CHANGE UP (ref 22–31)
CREAT SERPL-MCNC: 0.63 MG/DL — SIGNIFICANT CHANGE UP (ref 0.5–1.3)
EOSINOPHIL # BLD AUTO: 0 K/UL — SIGNIFICANT CHANGE UP (ref 0–0.5)
EOSINOPHIL NFR BLD AUTO: 0 % — SIGNIFICANT CHANGE UP (ref 0–6)
GLUCOSE SERPL-MCNC: 97 MG/DL — SIGNIFICANT CHANGE UP (ref 70–99)
HCT VFR BLD CALC: 33.3 % — LOW (ref 34.5–45)
HGB BLD-MCNC: 11.1 G/DL — LOW (ref 11.5–15.5)
LYMPHOCYTES # BLD AUTO: 1.19 K/UL — SIGNIFICANT CHANGE UP (ref 1–3.3)
LYMPHOCYTES # BLD AUTO: 3 % — LOW (ref 13–44)
MCHC RBC-ENTMCNC: 31.6 PG — SIGNIFICANT CHANGE UP (ref 27–34)
MCHC RBC-ENTMCNC: 33.3 GM/DL — SIGNIFICANT CHANGE UP (ref 32–36)
MCV RBC AUTO: 94.9 FL — SIGNIFICANT CHANGE UP (ref 80–100)
MONOCYTES # BLD AUTO: 0.4 K/UL — SIGNIFICANT CHANGE UP (ref 0–0.9)
MONOCYTES NFR BLD AUTO: 1 % — LOW (ref 2–14)
NEUTROPHILS # BLD AUTO: 38.03 K/UL — HIGH (ref 1.8–7.4)
NEUTROPHILS NFR BLD AUTO: 92 % — HIGH (ref 43–77)
NRBC # BLD: SIGNIFICANT CHANGE UP /100 WBCS (ref 0–0)
PLATELET # BLD AUTO: 211 K/UL — SIGNIFICANT CHANGE UP (ref 150–400)
POTASSIUM SERPL-MCNC: 3.7 MMOL/L — SIGNIFICANT CHANGE UP (ref 3.5–5.3)
POTASSIUM SERPL-SCNC: 3.7 MMOL/L — SIGNIFICANT CHANGE UP (ref 3.5–5.3)
RBC # BLD: 3.51 M/UL — LOW (ref 3.8–5.2)
RBC # FLD: 14 % — SIGNIFICANT CHANGE UP (ref 10.3–14.5)
SODIUM SERPL-SCNC: 138 MMOL/L — SIGNIFICANT CHANGE UP (ref 135–145)
WBC # BLD: 39.61 K/UL — HIGH (ref 3.8–10.5)
WBC # FLD AUTO: 39.61 K/UL — HIGH (ref 3.8–10.5)

## 2019-11-02 PROCEDURE — 94640 AIRWAY INHALATION TREATMENT: CPT

## 2019-11-02 PROCEDURE — 99285 EMERGENCY DEPT VISIT HI MDM: CPT | Mod: 25

## 2019-11-02 PROCEDURE — 86140 C-REACTIVE PROTEIN: CPT

## 2019-11-02 PROCEDURE — 97163 PT EVAL HIGH COMPLEX 45 MIN: CPT | Mod: GP

## 2019-11-02 PROCEDURE — 83605 ASSAY OF LACTIC ACID: CPT

## 2019-11-02 PROCEDURE — 99285 EMERGENCY DEPT VISIT HI MDM: CPT

## 2019-11-02 PROCEDURE — 71250 CT THORAX DX C-: CPT

## 2019-11-02 PROCEDURE — 85025 COMPLETE CBC W/AUTO DIFF WBC: CPT

## 2019-11-02 PROCEDURE — 93010 ELECTROCARDIOGRAM REPORT: CPT

## 2019-11-02 PROCEDURE — 93005 ELECTROCARDIOGRAM TRACING: CPT

## 2019-11-02 PROCEDURE — G0378: CPT

## 2019-11-02 PROCEDURE — 36415 COLL VENOUS BLD VENIPUNCTURE: CPT

## 2019-11-02 PROCEDURE — 80048 BASIC METABOLIC PNL TOTAL CA: CPT

## 2019-11-02 PROCEDURE — 97116 GAIT TRAINING THERAPY: CPT | Mod: GP

## 2019-11-02 RX ORDER — IPRATROPIUM/ALBUTEROL SULFATE 18-103MCG
3 AEROSOL WITH ADAPTER (GRAM) INHALATION EVERY 6 HOURS
Refills: 0 | Status: DISCONTINUED | OUTPATIENT
Start: 2019-11-02 | End: 2019-11-04

## 2019-11-02 RX ORDER — GABAPENTIN 400 MG/1
300 CAPSULE ORAL THREE TIMES A DAY
Refills: 0 | Status: DISCONTINUED | OUTPATIENT
Start: 2019-11-02 | End: 2019-11-04

## 2019-11-02 RX ORDER — LOSARTAN POTASSIUM 100 MG/1
50 TABLET, FILM COATED ORAL DAILY
Refills: 0 | Status: DISCONTINUED | OUTPATIENT
Start: 2019-11-02 | End: 2019-11-03

## 2019-11-02 RX ORDER — ATORVASTATIN CALCIUM 80 MG/1
80 TABLET, FILM COATED ORAL AT BEDTIME
Refills: 0 | Status: DISCONTINUED | OUTPATIENT
Start: 2019-11-02 | End: 2019-11-04

## 2019-11-02 RX ORDER — APIXABAN 2.5 MG/1
5 TABLET, FILM COATED ORAL
Refills: 0 | Status: DISCONTINUED | OUTPATIENT
Start: 2019-11-03 | End: 2019-11-04

## 2019-11-02 RX ORDER — PANTOPRAZOLE SODIUM 20 MG/1
40 TABLET, DELAYED RELEASE ORAL
Refills: 0 | Status: DISCONTINUED | OUTPATIENT
Start: 2019-11-02 | End: 2019-11-04

## 2019-11-02 RX ORDER — ONDANSETRON 8 MG/1
8 TABLET, FILM COATED ORAL THREE TIMES A DAY
Refills: 0 | Status: DISCONTINUED | OUTPATIENT
Start: 2019-11-02 | End: 2019-11-02

## 2019-11-02 RX ORDER — ACETAMINOPHEN 500 MG
650 TABLET ORAL EVERY 6 HOURS
Refills: 0 | Status: DISCONTINUED | OUTPATIENT
Start: 2019-11-02 | End: 2019-11-04

## 2019-11-02 RX ORDER — CYCLOBENZAPRINE HYDROCHLORIDE 10 MG/1
5 TABLET, FILM COATED ORAL THREE TIMES A DAY
Refills: 0 | Status: DISCONTINUED | OUTPATIENT
Start: 2019-11-02 | End: 2019-11-04

## 2019-11-02 RX ORDER — FENTANYL CITRATE 50 UG/ML
1.5 INJECTION INTRAVENOUS
Refills: 0 | Status: DISCONTINUED | OUTPATIENT
Start: 2019-11-02 | End: 2019-11-02

## 2019-11-02 RX ORDER — MULTIVIT-MIN/FERROUS GLUCONATE 9 MG/15 ML
1 LIQUID (ML) ORAL DAILY
Refills: 0 | Status: DISCONTINUED | OUTPATIENT
Start: 2019-11-02 | End: 2019-11-04

## 2019-11-02 RX ORDER — FENTANYL CITRATE 50 UG/ML
1 INJECTION INTRAVENOUS
Refills: 0 | Status: DISCONTINUED | OUTPATIENT
Start: 2019-11-02 | End: 2019-11-04

## 2019-11-02 RX ORDER — CHOLECALCIFEROL (VITAMIN D3) 125 MCG
1 CAPSULE ORAL
Qty: 0 | Refills: 0 | DISCHARGE

## 2019-11-02 RX ORDER — BUDESONIDE AND FORMOTEROL FUMARATE DIHYDRATE 160; 4.5 UG/1; UG/1
2 AEROSOL RESPIRATORY (INHALATION)
Refills: 0 | Status: DISCONTINUED | OUTPATIENT
Start: 2019-11-02 | End: 2019-11-04

## 2019-11-02 RX ORDER — ALPRAZOLAM 0.25 MG
0.5 TABLET ORAL ONCE
Refills: 0 | Status: DISCONTINUED | OUTPATIENT
Start: 2019-11-02 | End: 2019-11-02

## 2019-11-02 RX ORDER — ONDANSETRON 8 MG/1
8 TABLET, FILM COATED ORAL THREE TIMES A DAY
Refills: 0 | Status: DISCONTINUED | OUTPATIENT
Start: 2019-11-02 | End: 2019-11-04

## 2019-11-02 RX ORDER — ASPIRIN/CALCIUM CARB/MAGNESIUM 324 MG
81 TABLET ORAL DAILY
Refills: 0 | Status: DISCONTINUED | OUTPATIENT
Start: 2019-11-02 | End: 2019-11-04

## 2019-11-02 RX ORDER — DILTIAZEM HCL 120 MG
120 CAPSULE, EXT RELEASE 24 HR ORAL DAILY
Refills: 0 | Status: DISCONTINUED | OUTPATIENT
Start: 2019-11-02 | End: 2019-11-04

## 2019-11-02 RX ORDER — IPRATROPIUM/ALBUTEROL SULFATE 18-103MCG
3 AEROSOL WITH ADAPTER (GRAM) INHALATION ONCE
Refills: 0 | Status: COMPLETED | OUTPATIENT
Start: 2019-11-02 | End: 2019-11-02

## 2019-11-02 RX ORDER — ALPRAZOLAM 0.25 MG
0.25 TABLET ORAL
Refills: 0 | Status: DISCONTINUED | OUTPATIENT
Start: 2019-11-02 | End: 2019-11-04

## 2019-11-02 RX ADMIN — Medication 0.5 MILLIGRAM(S): at 17:25

## 2019-11-02 RX ADMIN — ATORVASTATIN CALCIUM 80 MILLIGRAM(S): 80 TABLET, FILM COATED ORAL at 23:03

## 2019-11-02 RX ADMIN — Medication 650 MILLIGRAM(S): at 17:24

## 2019-11-02 RX ADMIN — FENTANYL CITRATE 1 PATCH: 50 INJECTION INTRAVENOUS at 23:03

## 2019-11-02 RX ADMIN — Medication 650 MILLIGRAM(S): at 18:04

## 2019-11-02 RX ADMIN — FENTANYL CITRATE 1 PATCH: 50 INJECTION INTRAVENOUS at 23:02

## 2019-11-02 RX ADMIN — Medication 3 MILLILITER(S): at 17:25

## 2019-11-02 RX ADMIN — GABAPENTIN 300 MILLIGRAM(S): 400 CAPSULE ORAL at 23:03

## 2019-11-02 NOTE — H&P ADULT - ASSESSMENT
Pt is a 85 y old Female coming from home with hx of COPD, Left Lung mass showing to be metastatic small cell carcinoma, Atrial fibrillation, RA, HLD, HTN, with recent admissions several times in September, who presents to ED due to worsening shortness of breath and difficulty breathing after tripping and falling over her oxygen tank/tubing at home. After supplemental O2 placed back on, pt sx alleviated and at baseline in ED. Pt was for d/c home however deemed unsafe due to acute worsening of chronic pain and weakness and high risk repeat falls and readmission. Pt was last discharged home with palliative care and with PT recommending [facility]. Pt at the time was contemplating hospice. Pt reports worsening weakness and difficulty caring for self. Pt denies any cp, palpitations, n/v/d      1) Acute progression of chronic disease, small cell lung CA  - acutely worsening debility noted  - walking with walker at times however unsteady on feet, high fall risk as lives independently   - PPS 40-50% now due to sx  - malnutrition noted on exam, ensure TID  - PT eval ordered, [facility] recommended in past, on Palliative care at home, will likely qualify for hospice at this time.  - Case managment and palliative care consult placed    2) Chronic resp failure in setting of metastatic small cell lung cancer and underlying COPD  -CXR showing infiltrates/effusions on last admission and was s/p thoracentesis with IR, 1600cc removed  - c/w nebs and bronchodilators  - c/w supplemental O2  - opioids for resp. distress if needed, pt currently comfortable and baseline  - pt s/p RT and palliative    3) Chronic Pain, neuropathic  - c/w gabapentin (already at a reasonable dose)  - c/w fentanyl 37mcg    4) Anxiety  - c/w xanax po 0.25mg bid prn    5) GOC/Advanced Directives  - HCP on file naming her sister Lety Cardenas  - [facility] revised 9/30: DNR, limited medical interventions, DNI, send to hospital, no feeding tube, trial of IVF, determine use of abx  - Palliative consulted  - Case management consulted  - Pt will likely need placement in NH facility or [facility] vs [facility], needs exstensive home services if returns to home, high fall and readmission risk Pt is a 85 y old Female coming from home with hx of COPD, Left Lung mass showing to be metastatic small cell carcinoma, Atrial fibrillation, RA, HLD, HTN, with recent admissions several times in September, who presents to ED due to worsening shortness of breath and difficulty breathing due to non compliance/inability to manage equipment independently at home and requiring placement due to acute progression of debility.       1) Acute progression of chronic disease, small cell lung CA  - acutely worsening debility noted  - walking with walker at times however unsteady on feet, high fall risk as lives independently   - PPS 40-50% now due to sx  - malnutrition noted on exam, ensure TID  - PT eval ordered, [facility] recommended in past, on Palliative care at home, will likely qualify for hospice at this time, requesting placement  - Case management and palliative care consult placed  - Pt declining blood draws, accepts vitals, IVF if needed    2) Chronic resp failure in setting of metastatic small cell lung cancer and underlying COPD  -CXR showing infiltrates/effusions on last admission and was s/p thoracentesis with IR, 1600cc removed  - c/w nebs and bronchodilators  - c/w supplemental O2  - opioids for resp. distress if needed, pt currently comfortable and baseline  - pt s/p RT and palliative    3) Chronic Pain, neuropathic  - c/w gabapentin (already at a reasonable dose)  - c/w fentanyl 37mcg    4) Anxiety  - c/w xanax po 0.25mg bid prn    5) GOC/Advanced Directives  - HCP on file naming her sister Lety Cardenas  - [facility] revised 9/30: DNR, limited medical interventions, DNI, send to hospital, no feeding tube, trial of IVF, determine use of abx  - Palliative consulted  - Case management consulted  - Pt will likely need placement in NH facility or [facility] vs [facility], needs exstensive home services if returns to home, high fall and readmission risk

## 2019-11-02 NOTE — ED ADULT TRIAGE NOTE - CHIEF COMPLAINT QUOTE
pt presents to ED as per EMS pt walked to the bathroom with her O2 and it accidently got disconnected from her machine and she got anxious and SOB without her O2 for a few mins pt now with no distress O2 100%

## 2019-11-02 NOTE — ED PROVIDER NOTE - OBJECTIVE STATEMENT
86 yo Female  w/ PMH of dementia, paroxysmal afib, HLD, anxiety, smoking history / COPD on 02, JOSHUA chest mass/ hilar mass, left supraclavicular node: biopsy small cell cancer, biba from home for anxiety due to her being disconnected from her oxygen while going to the bathroom. No complaints at this time, feels better and 100% saturated on her regular 2LNC MONROE Cline DO 86 yo Female  w/ PMH of dementia, paroxysmal afib, HLD, anxiety, smoking history / COPD on 02, JOSHUA chest mass/ hilar mass, left supraclavicular node: biopsy small cell cancer, biba from home for anxiety due to her being disconnected from her oxygen while going to the bathroom. No complaints at this time, feels better and 100% saturated on her regular 2LNC

## 2019-11-02 NOTE — H&P ADULT - HISTORY OF PRESENT ILLNESS
t is a 85 y old Female coming from home with hx of COPD, Left Lung mass showing to be metastatic small cell carcinoma, Atrial fibrillation, RA, HLD, HTN, with recent admissions several times in September, who presents to ED due to worsening shortness of breath and difficulty breathing after tripping and falling over her oxygen tank/tubing at home. After supplemental O2 placed back on, pt sx alleviated and at baseline in ED. Pt was for d/c home however deemed unsafe due to acute worsening of chronic pain and weakness and high risk repeat falls and readmission. Pt was last discharged home with palliative care and with PT recommending [facility]. Pt at the time was contemplating hospice. Pt reports worsening weakness and difficulty caring for self. Pt denies any cp, palpitations, n/v/d, HA, visual changes, paresthesias, abd pain, chills, fever, congestion. Pt is a 85 y old Female coming from home with hx of COPD, Left Lung mass showing to be metastatic small cell carcinoma, Atrial fibrillation, RA, HLD, HTN, with recent admissions several times in September, who presents to ED due to worsening shortness of breath and difficulty breathing after being disconnected from her oxygen compressor at home. Pt states "I didn't believe you people and I didn't wear oxygen for two days, then I got a compressor to visit my brother out in [place] and accidently got my tubing disconnected and couldn't breath and had a panic attack until I noticed it was off and reconnected it" . After supplemental O2 placed back on, pt sx alleviated and at baseline in ED. Pt was for d/c home however deemed unsafe due to acute worsening of weakness, non compliance, and high risk repeat falls and readmission. Pt was last discharged home with palliative care and with PT recommending [facility]. Pt at the time was contemplating hospice, Pt now is sure she would like to have more palliative care or hospice. Pt reports worsening weakness and difficulty caring for self. Pt denies any cp, palpitations, n/v/d, HA, visual changes, paresthesias, abd pain, chills, fever, congestion. Endorses sob off oxygen, needing to use wheel chair or walker, and decreased appetite.

## 2019-11-02 NOTE — H&P ADULT - NSICDXFAMILYHX_GEN_ALL_CORE_FT
FAMILY HISTORY:  No pertinent family history in first degree relatives, of cancer, mother and father

## 2019-11-02 NOTE — ED ADULT NURSE REASSESSMENT NOTE - NS ED NURSE REASSESS COMMENT FT1
pt sitting up comfortably in bed, tea and yogurt provided per request. VS stable, no acute distress noted. Pt states feels at baseline. safety maintained.

## 2019-11-02 NOTE — ED PROVIDER NOTE - PATIENT PORTAL LINK FT
You can access the FollowMyHealth Patient Portal offered by [facility] by registering at the following website: [email or web address]. By joining [facility]’s FollowMyHealth portal, you will also be able to view your health information using other applications (apps) compatible with our system.

## 2019-11-02 NOTE — ED ADULT NURSE NOTE - OBJECTIVE STATEMENT
BIBA, pt states was off of her O2,pt did not realize that her O2 was detached. Pt currently on 2L O2 via NC with no distress, 100% RA. Hx lung CA, COPD

## 2019-11-02 NOTE — ED ADULT NURSE NOTE - NSIMPLEMENTINTERV_GEN_ALL_ED
Implemented All Fall with Harm Risk Interventions:  [place] to call system. Call bell, personal items and telephone within reach. Instruct patient to call for assistance. Room bathroom lighting operational. Non-slip footwear when patient is off stretcher. Physically safe environment: no spills, clutter or unnecessary equipment. Stretcher in lowest position, wheels locked, appropriate side rails in place. Provide visual cue, wrist band, yellow gown, etc. Monitor gait and stability. Monitor for mental status changes and reorient to person, place, and time. Review medications for side effects contributing to fall risk. Reinforce activity limits and safety measures with patient and family. Provide visual clues: red socks.

## 2019-11-02 NOTE — ED PROVIDER NOTE - CLINICAL SUMMARY MEDICAL DECISION MAKING FREE TEXT BOX
pt with anxiety after being disconnected from home oxygen by accident while going to the bathroom, now good, will get ekg and monitor for 1 hours no labs needed

## 2019-11-02 NOTE — ED PROVIDER NOTE - PROGRESS NOTE DETAILS
pt doing very well, no anxiety, no dyspnea no sob. pt has vns coming to her home. will d/c tohome .pt feels comfortable going home MONROE Cline DO sw called and stated pt is not safe to go home alone, spoke with sw here tba to obs. case dw Dr. Malathi Cline DO

## 2019-11-02 NOTE — H&P ADULT - ATTENDING COMMENTS
20 min of time spent discussing advanced care planning including code status, Pt is DNR/DNI. Pt is on palliative care at home. [facility] signed in past. Palliative consulted

## 2019-11-03 PROCEDURE — 71250 CT THORAX DX C-: CPT | Mod: 26

## 2019-11-03 RX ORDER — IPRATROPIUM/ALBUTEROL SULFATE 18-103MCG
3 AEROSOL WITH ADAPTER (GRAM) INHALATION
Qty: 90 | Refills: 0
Start: 2019-11-03 | End: 2019-12-02

## 2019-11-03 RX ORDER — ALBUTEROL 90 UG/1
3 AEROSOL, METERED ORAL
Qty: 0 | Refills: 0 | DISCHARGE

## 2019-11-03 RX ORDER — ALBUTEROL 90 UG/1
2 AEROSOL, METERED ORAL
Qty: 0 | Refills: 0 | DISCHARGE

## 2019-11-03 RX ORDER — PANTOPRAZOLE SODIUM 20 MG/1
1 TABLET, DELAYED RELEASE ORAL
Qty: 0 | Refills: 0 | DISCHARGE

## 2019-11-03 RX ORDER — ALBUTEROL 90 UG/1
2 AEROSOL, METERED ORAL
Qty: 1 | Refills: 0
Start: 2019-11-03 | End: 2019-12-02

## 2019-11-03 RX ORDER — IRBESARTAN 75 MG/1
1 TABLET ORAL
Qty: 0 | Refills: 0 | DISCHARGE

## 2019-11-03 RX ADMIN — PANTOPRAZOLE SODIUM 40 MILLIGRAM(S): 20 TABLET, DELAYED RELEASE ORAL at 05:50

## 2019-11-03 RX ADMIN — ATORVASTATIN CALCIUM 80 MILLIGRAM(S): 80 TABLET, FILM COATED ORAL at 21:18

## 2019-11-03 RX ADMIN — Medication 1 TABLET(S): at 13:31

## 2019-11-03 RX ADMIN — Medication 81 MILLIGRAM(S): at 13:30

## 2019-11-03 RX ADMIN — FENTANYL CITRATE 1 PATCH: 50 INJECTION INTRAVENOUS at 07:19

## 2019-11-03 RX ADMIN — BUDESONIDE AND FORMOTEROL FUMARATE DIHYDRATE 2 PUFF(S): 160; 4.5 AEROSOL RESPIRATORY (INHALATION) at 20:06

## 2019-11-03 RX ADMIN — GABAPENTIN 300 MILLIGRAM(S): 400 CAPSULE ORAL at 21:17

## 2019-11-03 RX ADMIN — GABAPENTIN 300 MILLIGRAM(S): 400 CAPSULE ORAL at 13:31

## 2019-11-03 RX ADMIN — FENTANYL CITRATE 1 PATCH: 50 INJECTION INTRAVENOUS at 21:15

## 2019-11-03 RX ADMIN — APIXABAN 5 MILLIGRAM(S): 2.5 TABLET, FILM COATED ORAL at 17:06

## 2019-11-03 RX ADMIN — GABAPENTIN 300 MILLIGRAM(S): 400 CAPSULE ORAL at 05:50

## 2019-11-03 RX ADMIN — Medication 0.25 MILLIGRAM(S): at 22:46

## 2019-11-03 RX ADMIN — BUDESONIDE AND FORMOTEROL FUMARATE DIHYDRATE 2 PUFF(S): 160; 4.5 AEROSOL RESPIRATORY (INHALATION) at 09:36

## 2019-11-03 RX ADMIN — APIXABAN 5 MILLIGRAM(S): 2.5 TABLET, FILM COATED ORAL at 05:50

## 2019-11-03 NOTE — PROGRESS NOTE ADULT - ASSESSMENT
Pt is a 85 y old Female coming from home with hx of COPD, Left Lung mass showing to be metastatic small cell carcinoma, Atrial fibrillation, RA, HLD, HTN, with recent admissions several times in September, who presents to ED due to worsening shortness of breath and difficulty breathing due to non compliance/inability to manage equipment independently at home and requiring placement due to acute progression of debility.       1) Acute progression of chronic disease, small cell lung CA  - acutely worsening debility noted  - walking with walker at times however unsteady on feet, high fall risk as lives independently   - PPS 40-50% now due to sx  - malnutrition noted on exam, ensure TID  - PT eval ordered, [facility] recommended in past, on Palliative care at home, will likely qualify for hospice at this time, requesting placement  - Case management and palliative care consult placed  - Pt declining blood draws, accepts vitals, IVF if needed    2) Chronic resp failure in setting of metastatic small cell lung cancer and underlying COPD  -CXR showing infiltrates/effusions on last admission and was s/p thoracentesis with IR, 1600cc removed  - c/w nebs and bronchodilators  - c/w supplemental O2  - opioids for resp. distress if needed, pt currently comfortable and baseline  - pt s/p RT and palliative    3) Chronic Pain, neuropathic  - c/w gabapentin (already at a reasonable dose)  - c/w fentanyl 37mcg    4) Anxiety  - c/w xanax po 0.25mg bid prn    5) GOC/Advanced Directives  - HCP on file naming her sister Lety Cardenas  - [facility] revised 9/30: DNR, limited medical interventions, DNI, send to hospital, no feeding tube, trial of IVF, determine use of abx  - Palliative consulted  - Case management consulted  - Pt will likely need placement in NH facility or [facility], needs exstensive home services if returns to home, high fall and readmission risk Pt is a 85 y old Female coming from home with hx of COPD, Left Lung mass showing to be metastatic small cell carcinoma, Atrial fibrillation, RA, HLD, HTN, with recent admissions several times in September, who presents to ED due to worsening shortness of breath and difficulty breathing due to non compliance/inability to manage equipment independently at home and requiring placement due to acute progression of debility.     *  Acute progression of chronic disease, small cell lung CA  - acutely worsening debility noted  - walking with walker at times however unsteady on feet, high fall risk as lives independently   - PPS 40-50% now due to sx  - malnutrition noted on exam, ensure TID  - PT eval ordered, [facility] recommended in past, on Palliative care at home, will likely qualify for hospice at this time, requesting placement  - Case management and palliative care consult placed  - Pt declining blood draws, accepts vitals, IVF if needed  - CT chest - mass unchanged ,  new RLL masses ? neoplasm vs infection  - oncology consult - candidate for  program hospice with chemo     *  Chronic resp failure in setting of metastatic small cell lung cancer and underlying COPD, stable   doubt PNA, no fever, no  productive cough  -CXR showing infiltrates/effusions on last admission and was s/p thoracentesis with IR, 1600cc removed  - c/w nebs and bronchodilators  - c/w supplemental O2  - opioids for resp. distress if needed, pt currently comfortable and baseline  - pt s/p RT and palliative    *  Chronic Pain, neuropathic  - c/w gabapentin (already at a reasonable dose)  - c/w fentanyl 37mcg    * Anxiety  - c/w xanax po 0.25mg bid prn    *  GOC/Advanced Directives  - HCP on file naming her sister Lety Cardenas  - [facility] revised 9/30: DNR, limited medical interventions, DNI, send to hospital, no feeding tube, trial of IVF, determine use of abx  - Palliative consulted  - Case management consulted  - Pt will likely need placement in NH facility or [facility], needs exstensive home services if returns to home, high fall and readmission risk       Dispo - PT, check O2 on ambulation with O2 supplementation, d/c planning in am to home with home care with transition to hospice with chemo  program d/w Dr. Tay

## 2019-11-03 NOTE — PROGRESS NOTE ADULT - SUBJECTIVE AND OBJECTIVE BOX
Subjective:  Patient is a 85y old  Female who presents with a chief complaint of fall, shortness of breath, anxiety (03 Nov 2019 09:39)    HPI:  Pt is a 85 y old Female coming from home with hx of COPD, Left Lung mass showing to be metastatic small cell carcinoma, Atrial fibrillation, RA, HLD, HTN, with recent admissions several times in September, who presents to ED due to worsening shortness of breath and difficulty breathing after being disconnected from her oxygen compressor at home. Pt states "I didn't believe you people and I didn't wear oxygen for two days, then I got a compressor to visit my brother out in [place] and accidently got my tubing disconnected and couldn't breath and had a panic attack until I noticed it was off and reconnected it" . After supplemental O2 placed back on, pt sx alleviated and at baseline in ED. Pt was for d/c home however deemed unsafe due to acute worsening of weakness, non compliance, and high risk repeat falls and readmission. Pt was last discharged home with palliative care and with PT recommending MATHEW. Pt at the time was contemplating hospice, Pt now is sure she would like to have more palliative care or hospice. Pt reports worsening weakness and difficulty caring for self. Pt denies any cp, palpitations, n/v/d, HA, visual changes, paresthesias, abd pain, chills, fever, congestion. Endorses sob off oxygen, needing to use wheel chair or walker, and decreased appetite. (02 Nov 2019 20:32)      Patient seen and examined at bedside earlier today,     Review of system- Rest of the review of system are negative except mentioned in HPI    OBJECTIVE:   T(C): 36.7 (11-03-19 @ 11:27), Max: 36.7 (11-03-19 @ 05:13)  HR: 90 (11-03-19 @ 11:27) (87 - 90)  BP: 100/60 (11-03-19 @ 11:27) (100/60 - 110/49)  RR: 16 (11-03-19 @ 11:27) (16 - 18)  SpO2: 100% (11-03-19 @ 11:27) (98% - 100%)  Wt(kg): --  Daily     Daily     PHYSICAL EXAM:  GENERAL: NAD  NERVOUS SYSTEM:  Alert & Oriented X3, non- focal exam, Motor Strength 5/5 B/L upper and lower extremities; DTRs 2+ intact and symmetric  HEAD:  Atraumatic, Normocephalic  EYES: EOMI, PERRLA, conjunctiva and sclera clear  HEENT: Moist mucous membranes  NECK: Supple, No JVD  CHEST/LUNG: Clear to auscultation bilaterally; No rales, no rhonchi, no wheezing, or rubs  HEART: Regular rate and rhythm; No murmurs, rubs, or gallops  ABDOMEN: Soft, Nontender, Nondistended; Bowel sounds present  GENITOURINARY- Voiding, no suprapubic tenderness  EXTREMITIES:  2+ Peripheral Pulses, No clubbing, cyanosis, or edema  MUSCULOSKELETAL:- No muscle tenderness, Muscle tone normal, No joint tenderness, no Joint swelling, Joint range of motion-normal  SKIN-no rash, no lesion    LABS:                        11.1   39.61 )-----------( 211      ( 02 Nov 2019 16:48 )             33.3     11-02    138  |  105  |  19  ----------------------------<  97  3.7   |  26  |  0.63    Ca    9.0      02 Nov 2019 16:48                CAPILLARY BLOOD GLUCOSE            RECENT CULTURES:    RADIOLOGY & ADDITIONAL TESTS:    Current medications:  acetaminophen   Tablet .. 650 milliGRAM(s) Oral every 6 hours PRN  albuterol/ipratropium for Nebulization 3 milliLiter(s) Nebulizer every 6 hours PRN  ALPRAZolam 0.25 milliGRAM(s) Oral two times a day PRN  apixaban 5 milliGRAM(s) Oral two times a day  aspirin enteric coated 81 milliGRAM(s) Oral daily  atorvastatin 80 milliGRAM(s) Oral at bedtime  budesonide 160 MICROgram(s)/formoterol 4.5 MICROgram(s) Inhaler 2 Puff(s) Inhalation two times a day  cyclobenzaprine 5 milliGRAM(s) Oral three times a day PRN  diltiazem    milliGRAM(s) Oral daily  fentaNYL   Patch  12 MICROgram(s)/Hr 1 Patch Transdermal every 72 hours  fentaNYL   Patch  25 MICROgram(s)/Hr 1 Patch Transdermal every 72 hours  gabapentin 300 milliGRAM(s) Oral three times a day  losartan 50 milliGRAM(s) Oral daily  multivitamin/minerals 1 Tablet(s) Oral daily  ondansetron   Disintegrating Tablet 8 milliGRAM(s) Oral three times a day PRN  pantoprazole    Tablet 40 milliGRAM(s) Oral before breakfast Subjective:  Patient is a 85y old  Female who presents with a chief complaint of fall, shortness of breath, anxiety     HPI:        85 y old Female coming from home with hx of COPD, Left Lung mass showing to be metastatic small cell carcinoma, Atrial fibrillation, RA, HLD, HTN, with recent admissions several times in September, who presents to ED on 11/02/19  due to worsening shortness of breath and difficulty breathing after being disconnected from her oxygen compressor at home. Pt states "I didn't believe you people and I didn't wear oxygen for two days, then I got a compressor to visit my brother out in [place] and accidently got my tubing disconnected and couldn't breath and had a panic attack until I noticed it was off and reconnected it" . After supplemental O2 placed back on, pt sx alleviated and at baseline in ED. Pt was for d/c home however deemed unsafe due to acute worsening of weakness, non compliance, and high risk repeat falls and readmission. Pt was last discharged home with palliative care and with PT recommending MATHEW. Pt at the time was contemplating hospice, Pt now is sure she would like to have more palliative care or hospice. Pt reports worsening weakness and difficulty caring for self. Pt denies any cp, palpitations, n/v/d, HA, visual changes, paresthesias, abd pain, chills, fever, congestion. Endorses sob off oxygen, needing to use wheel chair or walker, and decreased appetite.     11/3 - patient seen and examined at bedside earlier today, dyspnea on baseline, denies cp, cough, abdominal pain, anxious about everything, eager to go home but wants to wait until sister will come to pick her up in am    Review of system- Rest of the review of system are negative except mentioned in HPI    OBJECTIVE:   T(C): 36.7 (11-03-19 @ 11:27), Max: 36.7 (11-03-19 @ 05:13)  HR: 90 (11-03-19 @ 11:27) (87 - 90)  BP: 100/60 (11-03-19 @ 11:27) (100/60 - 110/49)  RR: 16 (11-03-19 @ 11:27) (16 - 18)  SpO2: 100% (11-03-19 @ 11:27) (98% - 100%)  Wt(kg): --  Daily     Daily     PHYSICAL EXAM:  GENERAL: NAD  NERVOUS SYSTEM:  Alert & Oriented X3, non- focal exam, Motor Strength 5/5 B/L upper and lower extremities; DTRs 2+ intact and symmetric  HEAD:  Atraumatic, Normocephalic  EYES: EOMI, PERRLA, conjunctiva and sclera clear  HEENT: Moist mucous membranes  NECK: Supple, No JVD  CHEST/LUNG: BS decreased at bases, No rales, no rhonchi, occasional  wheezing, or rubs  HEART: Regular rate and rhythm; No murmurs, rubs, or gallops  ABDOMEN: Soft, Nontender, Nondistended; Bowel sounds present  GENITOURINARY- Voiding, no suprapubic tenderness  EXTREMITIES:  2+ Peripheral Pulses, No clubbing, cyanosis, or edema  MUSCULOSKELETAL:- No muscle tenderness, Muscle tone normal, No joint tenderness, no Joint swelling, Joint range of motion-normal  SKIN-no rash, no lesion    LABS:                        11.1   39.61 )-----------( 211      ( 02 Nov 2019 16:48 )             33.3     11-02    138  |  105  |  19  ----------------------------<  97  3.7   |  26  |  0.63    Ca    9.0      02 Nov 2019 16:48    CAPILLARY BLOOD GLUCOSE    < from: 12 Lead ECG (11.02.19 @ 08:35) >  entricular Rate 92 BPM    Atrial Rate 92 BPM    P-R Interval 142 ms    QRS Duration 60 ms    Q-T Interval 348 ms    QTC Calculation(Bezet) 430 ms    P Axis 74 degrees    R Axis 63 degrees    T Axis 67 degrees    Diagnosis Line Sinus rhythm with frequent Premature ventricular complexes  Possible Left atrial enlargement  Borderline ECG  When compared with ECG of 06-OCT-2019 05:55,  Premature ventricular complexes are now Present    < end of copied text >          RECENT CULTURES:    RADIOLOGY & ADDITIONAL TESTS:  < from: CT Chest No Cont (11.03.19 @ 10:01) >  UNGS AND AIRWAYS: Emphysema. Unchanged left apical spiculated mass,   measuring 1.6 cm. Opacities in the right lower lobe, new since October 4, 2019, possibly pneumonia in the appropriate clinical setting. Underlying   neoplasm is also possible. Subcentimeter pulmonary nodules, measuring up   to 0.4 cm in the right lower lobe (series 2, image 76).    PLEURA: No pleural effusion.    MEDIASTINUM AND MIGDALIA: Left suprahilar/mediastinal mass is difficult to   measure without intravenous contrast, but may be slightly decreased in   size, measuring 4.5 x 3.8 cm, previously 5.7 x 4.7 cm.    VESSELS: Atherosclerotic calcifications.    HEART: Heart size is normal. No pericardial effusion.    CHEST WALL AND LOWER NECK: Absent left thyroid lobe.    VISUALIZED UPPER ABDOMEN: Within normal limits.    BONES: Degenerative changes of the spine.    IMPRESSION:     Left suprahilar/mediastinal mass appears slightly decreased in size since   October 4, 2019.     New masses in the right lower lobe, possibly infection or neoplasm.      < end of copied text >    Current medications:  acetaminophen   Tablet .. 650 milliGRAM(s) Oral every 6 hours PRN  albuterol/ipratropium for Nebulization 3 milliLiter(s) Nebulizer every 6 hours PRN  ALPRAZolam 0.25 milliGRAM(s) Oral two times a day PRN  apixaban 5 milliGRAM(s) Oral two times a day  aspirin enteric coated 81 milliGRAM(s) Oral daily  atorvastatin 80 milliGRAM(s) Oral at bedtime  budesonide 160 MICROgram(s)/formoterol 4.5 MICROgram(s) Inhaler 2 Puff(s) Inhalation two times a day  cyclobenzaprine 5 milliGRAM(s) Oral three times a day PRN  diltiazem    milliGRAM(s) Oral daily  fentaNYL   Patch  12 MICROgram(s)/Hr 1 Patch Transdermal every 72 hours  fentaNYL   Patch  25 MICROgram(s)/Hr 1 Patch Transdermal every 72 hours  gabapentin 300 milliGRAM(s) Oral three times a day  losartan 50 milliGRAM(s) Oral daily  multivitamin/minerals 1 Tablet(s) Oral daily  ondansetron   Disintegrating Tablet 8 milliGRAM(s) Oral three times a day PRN  pantoprazole    Tablet 40 milliGRAM(s) Oral before breakfast

## 2019-11-03 NOTE — CONSULT NOTE ADULT - SUBJECTIVE AND OBJECTIVE BOX
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 She lives / cares for self and given her diagnosis, tenuous clinical condition, had previously considered best supportive care/ was seen by palliative care; she was also seen by Radiation since her disease is confined to the left upper lobe / therefore was potentially 'curable' with combined RT/ Chemotherapy ; however declined this approach. She is back in the hospital , on , stable this AM/ Has leukocytosis / possibly from growth factors given after a second round of chemotherapy ( Carbo/ Etoposide)    PAST MEDICAL & SURGICAL HISTORY:  Rheumatoid arthritis  Lung mass  HLD (hyperlipidemia)  HTN (hypertension)  COPD (chronic obstructive pulmonary disease)  Carotid stent occlusion, initial encounter  H/O hernia repair  Ectopic thyroid tissue      MEDICATIONS  (STANDING):  apixaban 5 milliGRAM(s) Oral two times a day  aspirin enteric coated 81 milliGRAM(s) Oral daily  atorvastatin 80 milliGRAM(s) Oral at bedtime  budesonide 160 MICROgram(s)/formoterol 4.5 MICROgram(s) Inhaler 2 Puff(s) Inhalation two times a day  diltiazem    milliGRAM(s) Oral daily  fentaNYL   Patch  12 MICROgram(s)/Hr 1 Patch Transdermal every 72 hours  fentaNYL   Patch  25 MICROgram(s)/Hr 1 Patch Transdermal every 72 hours  gabapentin 300 milliGRAM(s) Oral three times a day  losartan 50 milliGRAM(s) Oral daily  multivitamin/minerals 1 Tablet(s) Oral daily  pantoprazole    Tablet 40 milliGRAM(s) Oral before breakfast    MEDICATIONS  (PRN):  acetaminophen   Tablet .. 650 milliGRAM(s) Oral every 6 hours PRN Mild Pain (1 - 3)  albuterol/ipratropium for Nebulization 3 milliLiter(s) Nebulizer every 6 hours PRN Shortness of Breath and/or Wheezing  ALPRAZolam 0.25 milliGRAM(s) Oral two times a day PRN for anxiety, for insomnia  cyclobenzaprine 5 milliGRAM(s) Oral three times a day PRN Muscle Spasm  ondansetron   Disintegrating Tablet 8 milliGRAM(s) Oral three times a day PRN Nausea and/or Vomiting      Allergies    No Known Allergies    Intolerances        SOCIAL HISTORY:    FAMILY HISTORY:  No pertinent family history in first degree relatives: of cancer, mother and father       Vital Signs Last 24 Hrs  T(C): 36.7 (2019 05:13), Max: 36.7 (2019 12:38)  T(F): 98.1 (2019 05:13), Max: 98.1 (2019 12:38)  HR: 89 (2019 05:13) (67 - 93)  BP: 110/49 (2019 05:13) (100/60 - 120/81)  BP(mean): --  RR: 18 (2019 05:13) (18 - 19)  SpO2: 98% (2019 05:13) (97% - 99%)  PHYSICAL EXAM:      Constitutional: Appears comfortable / + Anxious/ wearing 02     Eyes: EOMI, PERRLA ;No icterus    ENMT:  No oral lesions, thrush; pharynx not injected    Neck:  Supple; No masses, lymph nodes, no bruits    Breasts: NA    Back: No tenderness     Respiratory:  Clear to A/P, No wheezes, rhonchi, rales. No dullness to percussion    Cardiovascular: Normal rate and rhythm; normal S1 and S2; No murmurs. No gallop    Gastrointestinal: No distension, normal bowl sounds; No tendeness , guarding, rebound;  no masses, no hepatosplenomegaly no fluid wave    Genitourinary: No flank pains, no inguninal adenopathy    Rectal: NA    Extremities:  No phlebitis, no edema    Vascular: No acrocyanosis, no edema    Neurological: Alert and oriented. Cranial nerves II-XII WNL, Upper extremity / lower extremity  strength WNL;  Reflexes WNL, Plantar downgoing ; No cerebellar signs    Skin: No rash, petechiae purpura, ecchymoses    Lymph Nodes: No cervical, supraclavicular, axillary, inguinal adenopathy    Musculoskeletal: No joint swelling    Psychiatric: Alert, oriented, normal affect        LABS:                        11.1   39.61 )-----------( 211      ( 2019 16:48 )             33.3         138  |  105  |  19  ----------------------------<  97  3.7   |  26  |  0.63    Ca    9.0      2019 16:48

## 2019-11-03 NOTE — CONSULT NOTE ADULT - ASSESSMENT
Limited small cell cancer JOSHUA   S/P chemotherapy x 2  COPD  Anxiety   Leukocytosis/ possibly growth factors    A/P    Recommend CT chest: ensure no progression, in which case best supportive care would be recommended  If she has had a response: Can continue CT +/- RT : assuming 1) patient wants to continue therapy 2) she has the  wherewithal of receiving treatment: This will  require dual Oncology/ Supportive care services / There are   presently  programs ( Hospice/ with Chemotherapy etc) that may be the best option and will be looked  into.

## 2019-11-04 ENCOUNTER — TRANSCRIPTION ENCOUNTER (OUTPATIENT)
Age: 84
End: 2019-11-04

## 2019-11-04 VITALS
OXYGEN SATURATION: 100 % | TEMPERATURE: 98 F | RESPIRATION RATE: 18 BRPM | SYSTOLIC BLOOD PRESSURE: 109 MMHG | DIASTOLIC BLOOD PRESSURE: 44 MMHG | HEART RATE: 99 BPM

## 2019-11-04 LAB
ANION GAP SERPL CALC-SCNC: 4 MMOL/L — LOW (ref 5–17)
BASOPHILS # BLD AUTO: 0 K/UL — SIGNIFICANT CHANGE UP (ref 0–0.2)
BASOPHILS NFR BLD AUTO: 0 % — SIGNIFICANT CHANGE UP (ref 0–2)
BUN SERPL-MCNC: 16 MG/DL — SIGNIFICANT CHANGE UP (ref 7–23)
CALCIUM SERPL-MCNC: 8.6 MG/DL — SIGNIFICANT CHANGE UP (ref 8.5–10.1)
CHLORIDE SERPL-SCNC: 108 MMOL/L — SIGNIFICANT CHANGE UP (ref 96–108)
CO2 SERPL-SCNC: 29 MMOL/L — SIGNIFICANT CHANGE UP (ref 22–31)
CREAT SERPL-MCNC: 0.59 MG/DL — SIGNIFICANT CHANGE UP (ref 0.5–1.3)
CRP SERPL-MCNC: 0.84 MG/DL — HIGH (ref 0–0.4)
EOSINOPHIL # BLD AUTO: 0.48 K/UL — SIGNIFICANT CHANGE UP (ref 0–0.5)
EOSINOPHIL NFR BLD AUTO: 2 % — SIGNIFICANT CHANGE UP (ref 0–6)
GLUCOSE SERPL-MCNC: 97 MG/DL — SIGNIFICANT CHANGE UP (ref 70–99)
HCT VFR BLD CALC: 30.2 % — LOW (ref 34.5–45)
HGB BLD-MCNC: 10.1 G/DL — LOW (ref 11.5–15.5)
LACTATE SERPL-SCNC: 1.4 MMOL/L — SIGNIFICANT CHANGE UP (ref 0.7–2)
LYMPHOCYTES # BLD AUTO: 1.44 K/UL — SIGNIFICANT CHANGE UP (ref 1–3.3)
LYMPHOCYTES # BLD AUTO: 6 % — LOW (ref 13–44)
MCHC RBC-ENTMCNC: 31.9 PG — SIGNIFICANT CHANGE UP (ref 27–34)
MCHC RBC-ENTMCNC: 33.4 GM/DL — SIGNIFICANT CHANGE UP (ref 32–36)
MCV RBC AUTO: 95.3 FL — SIGNIFICANT CHANGE UP (ref 80–100)
MONOCYTES # BLD AUTO: 0 K/UL — SIGNIFICANT CHANGE UP (ref 0–0.9)
MONOCYTES NFR BLD AUTO: 0 % — LOW (ref 2–14)
NEUTROPHILS # BLD AUTO: 22.03 K/UL — HIGH (ref 1.8–7.4)
NEUTROPHILS NFR BLD AUTO: 90 % — HIGH (ref 43–77)
NRBC # BLD: SIGNIFICANT CHANGE UP /100 WBCS (ref 0–0)
PLATELET # BLD AUTO: 138 K/UL — LOW (ref 150–400)
POTASSIUM SERPL-MCNC: 4.1 MMOL/L — SIGNIFICANT CHANGE UP (ref 3.5–5.3)
POTASSIUM SERPL-SCNC: 4.1 MMOL/L — SIGNIFICANT CHANGE UP (ref 3.5–5.3)
RBC # BLD: 3.17 M/UL — LOW (ref 3.8–5.2)
RBC # FLD: 13.9 % — SIGNIFICANT CHANGE UP (ref 10.3–14.5)
SODIUM SERPL-SCNC: 141 MMOL/L — SIGNIFICANT CHANGE UP (ref 135–145)
WBC # BLD: 23.95 K/UL — HIGH (ref 3.8–10.5)
WBC # FLD AUTO: 23.95 K/UL — HIGH (ref 3.8–10.5)

## 2019-11-04 RX ORDER — MULTIVIT-MIN/FERROUS GLUCONATE 9 MG/15 ML
1 LIQUID (ML) ORAL
Qty: 0 | Refills: 0 | DISCHARGE

## 2019-11-04 RX ORDER — BUDESONIDE AND FORMOTEROL FUMARATE DIHYDRATE 160; 4.5 UG/1; UG/1
2 AEROSOL RESPIRATORY (INHALATION)
Qty: 0 | Refills: 0 | DISCHARGE

## 2019-11-04 RX ORDER — FENTANYL CITRATE 50 UG/ML
1 INJECTION INTRAVENOUS
Qty: 0 | Refills: 0 | DISCHARGE

## 2019-11-04 RX ORDER — ASPIRIN/CALCIUM CARB/MAGNESIUM 324 MG
1 TABLET ORAL
Qty: 0 | Refills: 0 | DISCHARGE

## 2019-11-04 RX ORDER — GABAPENTIN 400 MG/1
1 CAPSULE ORAL
Qty: 0 | Refills: 0 | DISCHARGE

## 2019-11-04 RX ORDER — ONDANSETRON 8 MG/1
1 TABLET, FILM COATED ORAL
Qty: 0 | Refills: 0 | DISCHARGE

## 2019-11-04 RX ORDER — FLUTICASONE FUROATE, UMECLIDINIUM BROMIDE AND VILANTEROL TRIFENATATE 200; 62.5; 25 UG/1; UG/1; UG/1
1 POWDER RESPIRATORY (INHALATION)
Qty: 0 | Refills: 0 | DISCHARGE

## 2019-11-04 RX ORDER — ROSUVASTATIN CALCIUM 5 MG/1
1 TABLET ORAL
Qty: 0 | Refills: 0 | DISCHARGE

## 2019-11-04 RX ORDER — APIXABAN 2.5 MG/1
1 TABLET, FILM COATED ORAL
Qty: 0 | Refills: 0 | DISCHARGE

## 2019-11-04 RX ORDER — DILTIAZEM HCL 120 MG
1 CAPSULE, EXT RELEASE 24 HR ORAL
Qty: 0 | Refills: 0 | DISCHARGE

## 2019-11-04 RX ORDER — CYCLOBENZAPRINE HYDROCHLORIDE 10 MG/1
1 TABLET, FILM COATED ORAL
Qty: 0 | Refills: 0 | DISCHARGE

## 2019-11-04 RX ADMIN — PANTOPRAZOLE SODIUM 40 MILLIGRAM(S): 20 TABLET, DELAYED RELEASE ORAL at 05:28

## 2019-11-04 RX ADMIN — BUDESONIDE AND FORMOTEROL FUMARATE DIHYDRATE 2 PUFF(S): 160; 4.5 AEROSOL RESPIRATORY (INHALATION) at 08:47

## 2019-11-04 RX ADMIN — APIXABAN 5 MILLIGRAM(S): 2.5 TABLET, FILM COATED ORAL at 05:28

## 2019-11-04 RX ADMIN — Medication 81 MILLIGRAM(S): at 11:29

## 2019-11-04 RX ADMIN — GABAPENTIN 300 MILLIGRAM(S): 400 CAPSULE ORAL at 14:20

## 2019-11-04 RX ADMIN — FENTANYL CITRATE 1 PATCH: 50 INJECTION INTRAVENOUS at 14:20

## 2019-11-04 RX ADMIN — Medication 1 TABLET(S): at 14:20

## 2019-11-04 RX ADMIN — FENTANYL CITRATE 1 PATCH: 50 INJECTION INTRAVENOUS at 07:20

## 2019-11-04 RX ADMIN — GABAPENTIN 300 MILLIGRAM(S): 400 CAPSULE ORAL at 05:28

## 2019-11-04 NOTE — PHYSICAL THERAPY INITIAL EVALUATION ADULT - MODALITIES TREATMENT COMMENTS
pt left seated in chair post Eval; chair alarm donned; O2 2L/min nc in place; callbell in reach; pt instructed not to get up alone; call nursing for assist; leeanna well; denied pain; RN Lena made aware pt OOB

## 2019-11-04 NOTE — DISCHARGE NOTE PROVIDER - PROVIDER TOKENS
PROVIDER:[TOKEN:[[id number]]],PROVIDER:[TOKEN:[[id number]]],PROVIDER:[TOKEN:[[id number]]] PROVIDER:[TOKEN:[[id number]]],PROVIDER:[TOKEN:[[id number]]],PROVIDER:[TOKEN:[[id number]]]

## 2019-11-04 NOTE — DISCHARGE NOTE NURSING/CASE MANAGEMENT/SOCIAL WORK - PATIENT PORTAL LINK FT
You can access the FollowMyHealth Patient Portal offered by [facility] by registering at the following website: [email or web address]. By joining [employer] [facility]’s FollowMyHealth portal, you will also be able to view your health information using other applications (apps) compatible with our system.

## 2019-11-04 NOTE — DISCHARGE NOTE PROVIDER - NSDCFUADDAPPT_GEN_ALL_CORE_FT
LUNGS AND AIRWAYS: Emphysema. Unchanged left apical spiculated mass,   measuring 1.6 cm. Opacities in the right lower lobe, new since October 4, 2019, possibly pneumonia in the appropriate clinical setting. Underlying   neoplasm is also possible. Subcentimeter pulmonary nodules, measuring up   to 0.4 cm in the right lower lobe (series 2, image 76).    PLEURA: No pleural effusion.    MEDIASTINUM AND MIGDALIA: Left suprahilar/mediastinal mass is difficult to   measure without intravenous contrast, but may be slightly decreased in   size, measuring 4.5 x 3.8 cm, previously 5.7 x 4.7 cm.    VESSELS: Atherosclerotic calcifications.    HEART: Heart size is normal. No pericardial effusion.    CHEST WALL AND LOWER NECK: Absent left thyroid lobe.    VISUALIZED UPPER ABDOMEN: Within normal limits.    BONES: Degenerative changes of the spine.    IMPRESSION:     Left suprahilar/mediastinal mass appears slightly decreased in size since   October 4, 2019.     New masses in the right lower lobe, possibly infection or neoplasm.    < end of copied text >

## 2019-11-04 NOTE — DISCHARGE NOTE PROVIDER - NSDCCPCAREPLAN_GEN_ALL_CORE_FT
PRINCIPAL DISCHARGE DIAGNOSIS  Diagnosis: Lung cancer  Assessment and Plan of Treatment: follow up with oncology within 1 week for further care and monitoring      SECONDARY DISCHARGE DIAGNOSES  Diagnosis: Atrial fibrillation  Assessment and Plan of Treatment: follow up with PCP and cardiology within 1-2 weeks    Diagnosis: COPD (chronic obstructive pulmonary disease)  Assessment and Plan of Treatment: follow up with pulmonologist within 1 week , use nebulizer at home as need for dyspnea, wear O2 supplementation all the time

## 2019-11-04 NOTE — DISCHARGE NOTE PROVIDER - NSDCMRMEDTOKEN_GEN_ALL_CORE_FT
ALPRAZolam 0.25 mg oral tablet: 1 tab(s) orally 2 times a day, As Needed - for anxiety, for insomnia MDD:2tb  aspirin 81 mg oral delayed release tablet: 1 tab(s) orally once a day  dilTIAZem 120 mg/24 hours oral capsule, extended release: 1 cap(s) orally once a day  Eliquis 5 mg oral tablet: 1 tab(s) orally 2 times a day  ***unsure of pt&#x27;s last dose***  fentaNYL 12 mcg/hr transdermal film, extended release: Apply topically to affected area every 72 hours MDD:12  to use with 25 mcg dose patch  fentaNYL 25 mcg/hr transdermal film, extended release: 1 film(s) transdermal every 72 hours  **pt just picked up from drug store- has not applied one yet**  Flexeril 5 mg oral tablet: 1 tab(s) orally 3 times a day, As Needed  gabapentin 300 mg oral capsule: 1 cap(s) orally 3 times a day  ipratropium-albuterol 0.5 mg-2.5 mg/3 mLinhalation solution: 3 milliliter(s) inhaled every 6 hours, As Needed -Shortness of Breath and/or Wheezing - for bronchospasm   ondansetron 8 mg oral tablet: 1 tab(s) orally 3 times a day, As Needed  pantoprazole 40 mg oral delayed release tablet: 1 tab(s) orally once a day   PreserVision AREDS 2 oral capsule: 1 cap(s) orally 2 times a day  rosuvastatin 40 mg oral tablet: 1 tab(s) orally once a day  Symbicort 160 mcg-4.5 mcg/inh inhalation aerosol: 2 puff(s) inhaled 2 times a day  Trelegy Ellipta inhalation powder: 1 puff(s) inhaled once a day  Ventolin HFA 90 mcg/inh inhalation aerosol: 2 puff(s) inhaled every 4 to 6 hours, As Needed - for shortness of breath and/or wheezing ALPRAZolam 0.25 mg oral tablet: 1 tab(s) orally 2 times a day, As Needed - for anxiety, for insomnia MDD:2tb  aspirin 81 mg oral delayed release tablet: 1 tab(s) orally once a day  dilTIAZem 120 mg/24 hours oral capsule, extended release: 1 cap(s) orally once a day  Eliquis 5 mg oral tablet: 1 tab(s) orally 2 times a day  ***unsure of pt&#x27;s last dose***  fentaNYL 12 mcg/hr transdermal film, extended release: Apply topically to affected area every 72 hours MDD:12  to use with 25 mcg dose patch  fentaNYL 25 mcg/hr transdermal film, extended release: 1 film(s) transdermal every 72 hours  **pt just picked up from drug store- has not applied one yet**  Flexeril 5 mg oral tablet: 1 tab(s) orally 3 times a day, As Needed  gabapentin 300 mg oral capsule: 1 cap(s) orally 3 times a day  ipratropium-albuterol 0.5 mg-2.5 mg/3 mLinhalation solution: 3 milliliter(s) inhaled every 6 hours, As Needed -Shortness of Breath and/or Wheezing - for bronchospasm   ondansetron 8 mg oral tablet: 1 tab(s) orally 3 times a day, As Needed  pantoprazole 40 mg oral delayed release tablet: 1 tab(s) orally once a day   PreserVision AREDS 2 oral capsule: 1 cap(s) orally 2 times a day  rosuvastatin 40 mg oral tablet: 1 tab(s) orally once a day  Trelegy Ellipta inhalation powder: 1 puff(s) inhaled once a day  Ventolin HFA 90 mcg/inh inhalation aerosol: 2 puff(s) inhaled every 4 to 6 hours, As Needed - for shortness of breath and/or wheezing

## 2019-11-04 NOTE — DISCHARGE NOTE PROVIDER - CARE PROVIDER_API CALL
Mickey Juarez)  Cardiovascular Disease; Internal Medicine  [address], [address]  [place] [address]  Phone: [phone]  Fax: [phone]  Follow Up Time:     Maximiliano Tay)  Hematology; Internal Medicine; Medical Oncology  [address] [place] [address]  Phone: [phone]  Fax: [phone]  Follow Up Time:     SRI Lundberg ()  Pulmonary Disease; Sleep Medicine  [address] [place] [address]  Phone: [phone]  Fax: [phone]  Follow Up Time: Mickey Juarez)  Cardiovascular Disease; Internal Medicine  [address], [address]  [place] [address]  Phone: [phone]  Fax: [phone]  Follow Up Time:     Maximiliano Tay)  Hematology; Internal Medicine; Medical Oncology  [address] [facility]nd [place] [address]  Phone: [phone]  Fax: [phone]  Follow Up Time:     Catarino Amador)  Internal Medicine  [address] [place] [address]  Phone: [phone]  Fax: [phone]  Follow Up Time:

## 2019-11-04 NOTE — DISCHARGE NOTE PROVIDER - HOSPITAL COURSE
Subjective:    Patient is a 85y old  Female who presents with a chief complaint of fall, shortness of breath, anxiety         HPI:          85 y old Female coming from home with hx of COPD, Left Lung mass showing to be metastatic small cell carcinoma, Atrial fibrillation, RA, HLD, HTN, with recent admissions several times in September, who presents to ED on 11/02/19  due to worsening shortness of breath and difficulty breathing after being disconnected from her oxygen compressor at home. Pt states "I didn't believe you people and I didn't wear oxygen for two days, then I got a compressor to visit my brother out in [place] and accidently got my tubing disconnected and couldn't breath and had a panic attack until I noticed it was off and reconnected it" . After supplemental O2 placed back on, pt sx alleviated and at baseline in ED. Pt was for d/c home however deemed unsafe due to acute worsening of weakness, non compliance, and high risk repeat falls and readmission. Pt was last discharged home with palliative care and with PT recommending [facility]. Pt at the time was contemplating hospice, Pt now is sure she would like to have more palliative care or hospice. Pt reports worsening weakness and difficulty caring for self. Pt denies any cp, palpitations, n/v/d, HA, visual changes, paresthesias, abd pain, chills, fever, congestion. Endorses sob off oxygen, needing to use wheel chair or walker, and decreased appetite.         11/3 - patient seen and examined at bedside earlier today, dyspnea on baseline, denies cp, cough, abdominal pain, anxious about everything, eager to go home but wants to wait until sister will come to pick her up in am    11/4 - pt seen and examined, eager to go home, no events overnight, afebrile,    Review of system- Rest of the review of system are negative except mentioned in HPI    T(C): 36.4 (11-04-19 @ 05:08), Max: 36.8 (11-03-19 @ 22:24)    T(F): 97.6 (11-04-19 @ 05:08), Max: 98.2 (11-03-19 @ 22:24)    HR: 79 (11-04-19 @ 08:49) (79 - 93)    BP: 107/54 (11-04-19 @ 05:08) (100/60 - 107/54)    RR: 18 (11-04-19 @ 05:08) (16 - 18)    SpO2: 99% (11-04-19 @ 08:49) (97% - 100%)    Wt(kg): --    NERVOUS SYSTEM:  Alert & Oriented X3, non- focal exam, Motor Strength 5/5 B/L upper and lower extremities; DTRs 2+ intact and symmetric    HEAD:  Atraumatic, Normocephalic    EYES: EOMI, PERRLA, conjunctiva and sclera clear    HEENT: Moist mucous membranes    NECK: Supple, No JVD    CHEST/LUNG: BS decreased at bases, No rales, no rhonchi, occasional  wheezing, or rubs    HEART: Regular rate and rhythm; No murmurs, rubs, or gallops    ABDOMEN: Soft, Nontender, Nondistended; Bowel sounds present    GENITOURINARY- Voiding, no suprapubic tenderness    EXTREMITIES:  2+ Peripheral Pulses, No clubbing, cyanosis, or edema    MUSCULOSKELETAL:- No muscle tenderness, Muscle tone normal, No joint tenderness, no Joint swelling, Joint range of motion-normal    SKIN-no rash, no lesion    *  Acute progression of chronic disease, small cell lung CA    - acutely worsening debility noted    - walking with walker at times however unsteady on feet, high fall risk as lives independently     - PPS 40-50% now due to sx    - malnutrition noted on exam, ensure TID    - PT eval ordered, [facility] recommended in past, on Palliative care at home, will likely qualify for hospice at this time, requesting placement    - Case management and palliative care consult placed    - Pt declining blood draws, accepts vitals, IVF if needed    - CT chest - mass unchanged ,  new RLL masses ? neoplasm vs infection    - oncology consult - candidate for  program hospice with chemo         *  Chronic resp failure in setting of metastatic small cell lung cancer and underlying COPD, stable     doubt PNA, no fever, no  productive cough    -CXR showing infiltrates/effusions on last admission and was s/p thoracentesis with IR, 1600cc removed    - c/w nebs and bronchodilators    - c/w supplemental O2    - opioids for resp. distress if needed, pt currently comfortable and baseline    - pt s/p RT and palliative        * Anxiety    - c/w xanax po 0.25mg bid prn        *  GOC/Advanced Directives    - HCP on file naming her sister Lety HERNANDEZ revised 9/30: DNR, limited medical interventions, DNI, send to hospital, no feeding tube, trial of IVF, determine use of abx    - Palliative consulted    - Case management consulted    - Pt will likely need placement in [facility] facility or [facility] vs [facility], needs exstensive home services if returns to home, high fall and readmission risk         Disposition - medically optimized to be discharged home with hospice referral  close follow up with PCP within 1 week    return to ED if fever, abdominal pain, nausea, vomiting, chest pain, dyspnea    Discharge plan discussed with patient, RN    Patient advised to follow up with PCP within 3-7 days    time spend 40 min    Discharge note faxed to PCP with my contact information to call me back     PCP Dr. Juarez

## 2019-11-06 LAB
CULTURE RESULTS: SIGNIFICANT CHANGE UP
SPECIMEN SOURCE: SIGNIFICANT CHANGE UP

## 2019-11-15 ENCOUNTER — APPOINTMENT (OUTPATIENT)
Dept: GASTROENTEROLOGY | Facility: CLINIC | Age: 84
End: 2019-11-15
Payer: MEDICARE

## 2019-11-15 VITALS
HEIGHT: 67 IN | WEIGHT: 138 LBS | SYSTOLIC BLOOD PRESSURE: 169 MMHG | DIASTOLIC BLOOD PRESSURE: 127 MMHG | HEART RATE: 63 BPM | BODY MASS INDEX: 21.66 KG/M2

## 2019-11-15 DIAGNOSIS — R91.8 OTHER NONSPECIFIC ABNORMAL FINDING OF LUNG FIELD: ICD-10-CM

## 2019-11-15 DIAGNOSIS — J44.9 CHRONIC OBSTRUCTIVE PULMONARY DISEASE, UNSPECIFIED: ICD-10-CM

## 2019-11-15 DIAGNOSIS — K21.9 GASTRO-ESOPHAGEAL REFLUX DISEASE W/OUT ESOPHAGITIS: ICD-10-CM

## 2019-11-15 PROCEDURE — 99214 OFFICE O/P EST MOD 30 MIN: CPT | Mod: GV

## 2019-11-15 RX ORDER — PANTOPRAZOLE 20 MG/1
20 TABLET, DELAYED RELEASE ORAL DAILY
Qty: 90 | Refills: 3 | Status: ACTIVE | COMMUNITY
Start: 2019-11-15 | End: 1900-01-01

## 2019-11-15 RX ORDER — DICYCLOMINE HYDROCHLORIDE 10 MG/1
10 CAPSULE ORAL 3 TIMES DAILY
Qty: 90 | Refills: 4 | Status: ACTIVE | COMMUNITY
Start: 2019-11-15 | End: 1900-01-01

## 2019-11-15 NOTE — PHYSICAL EXAM
[Heart Rate And Rhythm] : heart rate was normal and rhythm regular [Heart Sounds] : normal S1 and S2 [Heart Sounds Gallop] : no gallops [Murmurs] : no murmurs [Heart Sounds Pericardial Friction Rub] : no pericardial rub [Bowel Sounds] : normal bowel sounds [Abdomen Soft] : soft [Abdomen Tenderness] : non-tender [] : no hepato-splenomegaly [Abdomen Mass (___ Cm)] : no abdominal mass palpated [FreeTextEntry1] : Decreased breath sounds

## 2019-11-15 NOTE — ASSESSMENT
[FreeTextEntry1] : 64 yo female with SCLC, IBS, GERD. Will continue dicyclomine and add pantoprazole. Patient to call with any changes.

## 2019-11-15 NOTE — HISTORY OF PRESENT ILLNESS
[de-identified] : Ms. LA NENA SPANGLER is a 85 year old female with history of severe COPD and small cell lung cancer. Patient has had good response to abdominal pain with use of dicyclomine. Patient has noted some epigastric burning and dyspepsia which is new. Patient underwent several cycles of chemotherapy and has elected to stop treatment at this point.\par

## 2019-11-27 LAB
CULTURE RESULTS: SIGNIFICANT CHANGE UP
SPECIMEN SOURCE: SIGNIFICANT CHANGE UP

## 2020-06-26 NOTE — ED ADULT NURSE NOTE - NS ED NURSE DISCH DISPOSITION
Patient admitted with BMI of 38.25 with documentation of severe obesity. If possible, please document in progress notes and discharge summary if you are evaluating and /or treating any of the following: ? Morbid obesity ? Obesity ? Other, please specify ? BMI not clinically significant The medical record reflects the following: 
  Risk Factors: Hx. Htn, GERD Clinical Indicators: 5'6\" 237 lbs per NN flowsheet Treatment: Diet education, follow weight REFERENCE: 
The [address] [place] [address] has issued a statement indicating that, \"Individuals who are obese or morbidly obese are at an increased risk for certain medical conditions when compared to persons of normal weight. Therefore, these conditions are always clinically significant and reportable when documented by the provider. \" Morbidly Obese:  BMI >/=40 or BMI >35 with obesity-related health condition  
      (e.g. Type 2 DM, HTN, KHANG, GERD, depression, OA weight bearing joints, urinary  
              stress incontinence, etc.) Obese/Obesity:  BMI 30 - 39.9 Overweight:  BMI 25 - 29.9
Pt admitted with sepsis due to pyelonephritis. Pt noted to have had a ureteral stent pulled 4 days PTA. If possible, please document in progress notes and discharge summary the relationship, if any, between the stent removal, and the postop infection ? Sepsis/Pyelonephritis related to the stent removal on 6/15 Sepsis/Pyelonephritis related to the stent removal on 6/15 
? Other, please specify ? Clinically unable to determine The medical record reflects the following: 
               Risk Factors: Hx. Recent stent removal 6/15/20 Clinical indicators:  Per H&P-Sepsis (POA): due to pyelonephritis 6/18-Per PN -She reports having had a ureteral stent removed last Monday and that it caused quite a bit of pain during that procedure. She is having bilateral lower abd tenderness, bilateral CVA tenderness L>R Per Urology-Appears to be left pyelonephritis in setting of recent upper tract instrumentation & stent removal. Hydro likely secondary to bacterial infection and possible endotoxins leading to ureteral stasis and impaired peristalsis. 6/19-Per PN-BC NGTD, urine culture +ENTEROCOCCUS FAECALIS 
6/20-Per ID-L pyelonephritis, UTI, recent urological procedure/stent removal 6/15/20 Treatment:  Urology consult, monitor labwork, vital signs, imaging, IV Rocephin 1 gm q24 hours, IV Vancomycin stopped on 6/20, IV zosyn, ID consult
Admitted

## 2021-04-17 ASSESSMENT — TONOMETRY
OS_IOP_MMHG: 14
OD_IOP_MMHG: 13

## 2021-04-17 ASSESSMENT — VISUAL ACUITY
OD_SC: 20/40
OS_SC: 20/30

## 2022-01-28 NOTE — PATIENT PROFILE ADULT - DO YOU FEEL LIKE HURTING YOURSELF OR OTHERS?
no Quality 226: Preventive Care And Screening: Tobacco Use: Screening And Cessation Intervention: Patient screened for tobacco use and is an ex/non-smoker Detail Level: Detailed Quality 130: Documentation Of Current Medications In The Medical Record: Current Medications Documented Quality 111:Pneumonia Vaccination Status For Older Adults: Pneumococcal Vaccination Previously Received

## 2022-04-09 NOTE — PROVIDER CONTACT NOTE (OTHER) - REASON
Dr Parker / ICU on call - responded to RRT   RRT called form lower CP / mainly epigastric and lower abd pain with no radiation and no significant dyspnea or diaphoresis , no dizziness and no fever or chills , no diaphoresis  / no focal deficit or weakness   150/80 , 18 , 80 , 98.3 and O2 sat 100 %   HEENT : at , nc , anicteric sclera   Neck : no JVD , no stridor   Chest : CTA(B)   abd : epiaortic tenderness ( the main area of pain ) / otherwise soft  BS + but diminished   Ext : no edema   A,A,Ox 3 no focal deficit     1- atypical cp / mainly epigastric pain / pt with acute pancreatitis with ongoing pain   Doubt cardiac pain   HD stable   EKG NSR / no changes from baseline EKG / chronic nonspecific changes   Keep on tele   Check troponin cardiology - Consult

## 2024-01-11 NOTE — PATIENT PROFILE ADULT - NSPROGENOTHERPROVIDER_GEN_A_NUR
none PAST MEDICAL HISTORY:  Anxiety     Endometriosis     GERD (gastroesophageal reflux disease)     High cholesterol     HTN (hypertension)     Nausea

## 2024-03-04 NOTE — ED ADULT NURSE NOTE - SUICIDE SCREENING QUESTION 2
Please verify that he increased as I recommend and how he feels the dose is going. If he is taking 3 twice a day, I can change the dosing so he only needs to take one twice a day.   No

## 2025-01-17 NOTE — ED PROVIDER NOTE - MDM ORDERS SUBMITTED SELECTION
lvm for pt to call back to schedule appt w/GI    EKG/Not Applicable EKG/Not Applicable/Labs/Imaging Studies

## 2025-01-27 NOTE — DISCHARGE NOTE PROVIDER - CARE PROVIDER_API CALL
Maximiliano Tay)  Hematology; Internal Medicine; Medical Oncology  [address] [facility] [place] [address]  Phone: [phone]  Fax: [phone]  Follow Up Time:     Catarino Amador)  Internal Medicine  [address] [place] [address]  Phone: [phone]  Fax: [phone]  Follow Up Time:     Mickey Juarez)  Cardiovascular Disease; Internal Medicine  [address] [facility], [address]  [place] [address]  Phone: [phone]  Fax: [phone]  Follow Up Time: normal performance